# Patient Record
Sex: FEMALE | Race: BLACK OR AFRICAN AMERICAN | NOT HISPANIC OR LATINO | Employment: UNEMPLOYED | URBAN - METROPOLITAN AREA
[De-identification: names, ages, dates, MRNs, and addresses within clinical notes are randomized per-mention and may not be internally consistent; named-entity substitution may affect disease eponyms.]

---

## 2021-04-05 ENCOUNTER — HOSPITAL ENCOUNTER (EMERGENCY)
Facility: HOSPITAL | Age: 36
Discharge: HOME | End: 2021-04-05
Attending: EMERGENCY MEDICINE | Admitting: EMERGENCY MEDICINE

## 2021-04-05 VITALS
DIASTOLIC BLOOD PRESSURE: 77 MMHG | TEMPERATURE: 98.4 F | SYSTOLIC BLOOD PRESSURE: 142 MMHG | HEART RATE: 88 BPM | OXYGEN SATURATION: 100 % | RESPIRATION RATE: 16 BRPM | WEIGHT: 283.07 LBS

## 2021-04-05 DIAGNOSIS — H54.3 VISION LOSS, BILATERAL: Primary | ICD-10-CM

## 2021-04-05 DIAGNOSIS — R73.9 HYPERGLYCEMIA: ICD-10-CM

## 2021-04-05 DIAGNOSIS — R03.0 ELEVATED BLOOD PRESSURE READING WITHOUT DIAGNOSIS OF HYPERTENSION: ICD-10-CM

## 2021-04-05 LAB
ANION GAP SERPL CALCULATED.3IONS-SCNC: 8 MMOL/L (ref 4–13)
BASOPHILS # BLD AUTO: 0.04 THOUSANDS/ΜL (ref 0–0.1)
BASOPHILS NFR BLD AUTO: 1 % (ref 0–1)
BUN SERPL-MCNC: 13 MG/DL (ref 5–25)
CALCIUM SERPL-MCNC: 9.3 MG/DL (ref 8.3–10.1)
CHLORIDE SERPL-SCNC: 102 MMOL/L (ref 100–108)
CO2 SERPL-SCNC: 27 MMOL/L (ref 21–32)
CREAT SERPL-MCNC: 1.07 MG/DL (ref 0.6–1.3)
EOSINOPHIL # BLD AUTO: 0.29 THOUSAND/ΜL (ref 0–0.61)
EOSINOPHIL NFR BLD AUTO: 6 % (ref 0–6)
ERYTHROCYTE [DISTWIDTH] IN BLOOD BY AUTOMATED COUNT: 12.3 % (ref 11.6–15.1)
GFR SERPL CREATININE-BSD FRML MDRD: 77 ML/MIN/1.73SQ M
GLUCOSE SERPL-MCNC: 240 MG/DL (ref 65–140)
GLUCOSE SERPL-MCNC: 289 MG/DL (ref 65–140)
HCT VFR BLD AUTO: 40.7 % (ref 34.8–46.1)
HGB BLD-MCNC: 13.8 G/DL (ref 11.5–15.4)
IMM GRANULOCYTES # BLD AUTO: 0.02 THOUSAND/UL (ref 0–0.2)
IMM GRANULOCYTES NFR BLD AUTO: 0 % (ref 0–2)
LYMPHOCYTES # BLD AUTO: 1.45 THOUSANDS/ΜL (ref 0.6–4.47)
LYMPHOCYTES NFR BLD AUTO: 27 % (ref 14–44)
MCH RBC QN AUTO: 31.3 PG (ref 26.8–34.3)
MCHC RBC AUTO-ENTMCNC: 33.9 G/DL (ref 31.4–37.4)
MCV RBC AUTO: 92 FL (ref 82–98)
MONOCYTES # BLD AUTO: 0.48 THOUSAND/ΜL (ref 0.17–1.22)
MONOCYTES NFR BLD AUTO: 9 % (ref 4–12)
NEUTROPHILS # BLD AUTO: 3.02 THOUSANDS/ΜL (ref 1.85–7.62)
NEUTS SEG NFR BLD AUTO: 57 % (ref 43–75)
NRBC BLD AUTO-RTO: 0 /100 WBCS
PLATELET # BLD AUTO: 226 THOUSANDS/UL (ref 149–390)
PMV BLD AUTO: 10.9 FL (ref 8.9–12.7)
POTASSIUM SERPL-SCNC: 3.5 MMOL/L (ref 3.5–5.3)
RBC # BLD AUTO: 4.41 MILLION/UL (ref 3.81–5.12)
SODIUM SERPL-SCNC: 137 MMOL/L (ref 136–145)
WBC # BLD AUTO: 5.3 THOUSAND/UL (ref 4.31–10.16)

## 2021-04-05 PROCEDURE — 85025 COMPLETE CBC W/AUTO DIFF WBC: CPT | Performed by: EMERGENCY MEDICINE

## 2021-04-05 PROCEDURE — 36415 COLL VENOUS BLD VENIPUNCTURE: CPT | Performed by: EMERGENCY MEDICINE

## 2021-04-05 PROCEDURE — 99284 EMERGENCY DEPT VISIT MOD MDM: CPT

## 2021-04-05 PROCEDURE — 99284 EMERGENCY DEPT VISIT MOD MDM: CPT | Performed by: EMERGENCY MEDICINE

## 2021-04-05 PROCEDURE — 80048 BASIC METABOLIC PNL TOTAL CA: CPT | Performed by: EMERGENCY MEDICINE

## 2021-04-05 PROCEDURE — 82948 REAGENT STRIP/BLOOD GLUCOSE: CPT

## 2021-04-05 RX ORDER — TETRACAINE HYDROCHLORIDE 5 MG/ML
1 SOLUTION OPHTHALMIC ONCE
Status: COMPLETED | OUTPATIENT
Start: 2021-04-05 | End: 2021-04-05

## 2021-04-05 RX ADMIN — TETRACAINE HYDROCHLORIDE 1 DROP: 5 SOLUTION OPHTHALMIC at 13:19

## 2021-04-05 RX ADMIN — FLUORESCEIN SODIUM 1 STRIP: 1 STRIP OPHTHALMIC at 13:19

## 2021-04-05 NOTE — DISCHARGE INSTRUCTIONS
The [facility] can see you today  I was assured that there are arrangements that can be made since you are currently uninsured  Type 2 Diabetes in Adults   WHAT YOU NEED TO KNOW:   Type 2 diabetes is a disease that affects how your body uses glucose (sugar)  Normally, when the blood sugar level increases, the pancreas makes more insulin  Insulin helps move sugar out of the blood so it can be used for energy  Type 2 diabetes develops because either the body cannot make enough insulin, or it cannot use the insulin correctly  After many years, your pancreas may stop making insulin  DISCHARGE INSTRUCTIONS:   Call 911 for any of the following: You have any of the following signs of a stroke:      Numbness or drooping on one side of your face     Weakness in an arm or leg    Confusion or difficulty speaking    Dizziness, a severe headache, or vision loss    You have any of the following signs of a heart attack:      Squeezing, pressure, or pain in your chest that lasts longer than 5 minutes or returns    Discomfort or pain in your back, neck, jaw, stomach, or arm     Trouble breathing    Nausea or vomiting    Lightheadedness or a sudden cold sweat, especially with chest pain or trouble breathing  Return to the emergency department if:   You have severe abdominal pain, or the pain spreads to your back  You may also be vomiting  You have trouble staying awake or focusing  You are shaking or sweating  You have blurred or double vision  Your breath has a fruity, sweet smell  Your breathing is deep and labored, or rapid and shallow  Your heartbeat is fast and weak  Contact your healthcare provider if:   You are vomiting or have diarrhea  You have an upset stomach and cannot eat the foods on your meal plan  You feel weak or more tired than usual      You feel dizzy, have headaches, or are easily irritated  Your skin is red, warm, dry, or swollen       You have a wound that does not heal      You have numbness in your arms or legs  You have trouble coping with your illness, or you feel anxious or depressed  You have questions or concerns about your condition or care  Keep track of carbohydrates (sugar and starchy foods)  Your blood sugar level can get too high if you eat too many carbohydrates  Your dietitian will help you plan meals and snacks that have the right amount of carbohydrates  Eat low-fat foods , such as skinless chicken and low-fat milk  Eat less sodium (salt)  Limit high-sodium foods, such as soy sauce, potato chips, and soup  Do not add salt to food you cook  Limit your use of table salt  You should have less than 2,300 mg of sodium per day  Eat high-fiber foods , such as vegetables, whole grain breads, and beans  Limit alcohol  Alcohol affects your blood sugar level and can make it harder to manage your diabetes  Limit alcohol to 1 drink a day if you are a woman  Limit alcohol to 2 drinks a day if you are a man  A drink of alcohol is 12 ounces of beer, 5 ounces of wine, or 1½ ounces of liquor

## 2021-04-05 NOTE — ED PROVIDER NOTES
History  Chief Complaint   Patient presents with    Blurred Vision     blurry vision [id number] states now worse states now can only see light and shadow, also states eye pain and redness, periodic headaches since feb  38 yo female with no previously diagnosed medical conditions, c/o onset of progressive vision loss, of both eyes over the last month, so that now she can only perceive light/shadow  She affirms periodical sensation of eye pain, but not severe, and periodic HA  She does affirm redness of both eyes that corresponds to pain  Today she has no HA, mild eye pain, R>L, but no vision, which was confirmed in triage, and by me at bedside  She cannot read anything held in front of her  History provided by:  Patient      None       Past Medical History:   Diagnosis Date    No known health problems        Past Surgical History:   Procedure Laterality Date    NO PAST SURGERIES         History reviewed  No pertinent family history  I have reviewed and agree with the history as documented  E-Cigarette/Vaping     E-Cigarette/Vaping Substances     Social History     Tobacco Use    Smoking status: Never Smoker    Smokeless tobacco: Never Used   Substance Use Topics    Alcohol use: Never     Frequency: Never    Drug use: Never       Review of Systems   Constitutional: Negative for appetite change, chills and fever  HENT: Negative for sore throat  Eyes: Positive for pain, redness and visual disturbance  Respiratory: Negative for cough, shortness of breath and wheezing  Cardiovascular: Negative for chest pain and palpitations  Gastrointestinal: Negative for abdominal pain, diarrhea, nausea and vomiting  Genitourinary: Negative for dysuria and hematuria  Musculoskeletal: Negative for neck pain  Skin: Negative for rash  Neurological: Positive for headaches  Negative for dizziness and weakness  Psychiatric/Behavioral: Negative for suicidal ideas     All other systems reviewed and are negative  Physical Exam  Physical Exam  Vitals signs and nursing note reviewed  Constitutional:       General: She is not in acute distress  Appearance: She is well-developed  She is obese  She is not diaphoretic  HENT:      Head: Normocephalic and atraumatic  Right Ear: External ear normal       Left Ear: External ear normal       Nose: Nose normal    Eyes:      General:         Right eye: No foreign body  Left eye: No foreign body  Intraocular pressure: Right eye pressure is 7 mmHg  Left eye pressure is 8 mmHg  Measurements were taken using a handheld tonometer  Extraocular Movements:      Right eye: Normal extraocular motion  Left eye: Normal extraocular motion  Conjunctiva/sclera:      Right eye: Right conjunctiva is injected  No chemosis, exudate or hemorrhage  Left eye: Left conjunctiva is injected  No chemosis, exudate or hemorrhage  Pupils:      Right eye: Pupil is not reactive  No fluorescein uptake  Left eye: Pupil is not reactive  No fluorescein uptake  Slit lamp exam:     Right eye: No hypopyon  Left eye: No hypopyon  Comments: Bilateral sclera are cloudy and pupils are obstructed by a cloudy structure  Visual acuity is only light and movement perception   Neck:      Musculoskeletal: Normal range of motion and neck supple  Cardiovascular:      Rate and Rhythm: Normal rate and regular rhythm  Pulmonary:      Effort: Pulmonary effort is normal    Abdominal:      Palpations: Abdomen is soft  Musculoskeletal: Normal range of motion  Skin:     General: Skin is warm and dry  Capillary Refill: Capillary refill takes less than 2 seconds  Findings: No rash  Neurological:      Mental Status: She is alert and oriented to person, place, and time  Gait: Gait normal    Psychiatric:         Behavior: Behavior normal          Thought Content:  Thought content normal          Judgment: Judgment normal          Vital Signs  ED Triage Vitals   Temperature Pulse Respirations Blood Pressure SpO2   04/05/21 1247 04/05/21 1246 04/05/21 1246 04/05/21 1246 04/05/21 1246   98 4 °F (36 9 °C) 99 18 (!) 185/109 98 %      Temp Source Heart Rate Source Patient Position - Orthostatic VS BP Location FiO2 (%)   04/05/21 1247 04/05/21 1432 04/05/21 1246 04/05/21 1246 --   Oral Monitor Sitting Right arm       Pain Score       04/05/21 1246       6           Vitals:    04/05/21 1246 04/05/21 1327 04/05/21 1432   BP: (!) 185/109 (!) 173/105 142/77   Pulse: 99 89 88   Patient Position - Orthostatic VS: Sitting           Visual Acuity  Visual Acuity      Most Recent Value   L Pupil Size (mm)  3   R Pupil Size (mm)  3          ED Medications  Medications   tetracaine 0 5 % ophthalmic solution 1 drop (1 drop Both Eyes Given by Other 4/5/21 1319)   fluorescein sodium sterile ophthalmic strip 1 strip (1 strip Both Eyes Given by Other 4/5/21 1319)       Diagnostic Studies  Results Reviewed     Procedure Component Value Units Date/Time    Basic metabolic panel [[id number]]  (Abnormal) Collected: 04/05/21 1403    Lab Status: Final result Specimen: Blood from Hand, Left Updated: 04/05/21 1428     Sodium 137 mmol/L      Potassium 3 5 mmol/L      Chloride 102 mmol/L      CO2 27 mmol/L      ANION GAP 8 mmol/L      BUN 13 mg/dL      Creatinine 1 07 mg/dL      Glucose 289 mg/dL      Calcium 9 3 mg/dL      eGFR 77 ml/min/1 73sq m     Narrative:      Mariely guidelines for Chronic Kidney Disease (CKD):     Stage 1 with normal or high GFR (GFR > 90 mL/min/1 73 square meters)    Stage 2 Mild CKD (GFR = 60-89 mL/min/1 73 square meters)    Stage 3A Moderate CKD (GFR = 45-59 mL/min/1 73 square meters)    Stage 3B Moderate CKD (GFR = 30-44 mL/min/1 73 square meters)    Stage 4 Severe CKD (GFR = 15-29 mL/min/1 73 square meters)    Stage 5 End Stage CKD (GFR <15 mL/min/1 73 square meters)  Note: GFR calculation is accurate only with a steady state creatinine    CBC and differential [[id number]] Collected: 04/05/21 1403    Lab Status: Final result Specimen: Blood from Hand, Left Updated: 04/05/21 1409     WBC 5 30 Thousand/uL      RBC 4 41 Million/uL      Hemoglobin 13 8 g/dL      Hematocrit 40 7 %      MCV 92 fL      MCH 31 3 pg      MCHC 33 9 g/dL      RDW 12 3 %      MPV 10 9 fL      Platelets 583 Thousands/uL      nRBC 0 /100 WBCs      Neutrophils Relative 57 %      Immat GRANS % 0 %      Lymphocytes Relative 27 %      Monocytes Relative 9 %      Eosinophils Relative 6 %      Basophils Relative 1 %      Neutrophils Absolute 3 02 Thousands/µL      Immature Grans Absolute 0 02 Thousand/uL      Lymphocytes Absolute 1 45 Thousands/µL      Monocytes Absolute 0 48 Thousand/µL      Eosinophils Absolute 0 29 Thousand/µL      Basophils Absolute 0 04 Thousands/µL     Fingerstick Glucose (POCT) [[id number]]  (Abnormal) Collected: 04/05/21 1326    Lab Status: Final result Updated: 04/05/21 1328     POC Glucose 240 mg/dl                  No orders to display              Procedures  Procedures         ED Course  ED Course as of Apr 05 1438   Mon Apr 05, 2021   1356 Call to [facility], waiting for call back, I am doing labs in the meantime for other medical complications      8109 I discussed the case with covering practitioner at [facility] office, and she asked that patient come right over, they know she is uninsured      1435 Reviewed results with patient at bedside and updated on the plan, informing her of elevated blood sugar representing undiagnosed DM2, and elevated blood pressure, which did improve in ED, advising follow up as soon as possible to get these under control and that they contribute to vision changes  SBIRT 20yo+      Most Recent Value   SBIRT (24 yo +)   In order to provide better care to our patients, we are screening all of our patients for alcohol and drug use   Would it be okay to ask you these screening questions? No Filed at: 04/05/2021 1409   Initial Alcohol Screen: US AUDIT-C    1  How often do you have a drink containing alcohol?  0 Filed at: 04/05/2021 1409   2  How many drinks containing alcohol do you have on a typical day you are drinking? 0 Filed at: 04/05/2021 1409   3a  Male UNDER 65: How often do you have five or more drinks on one occasion? 0 Filed at: 04/05/2021 1409   3b  FEMALE Any Age, or MALE 65+: How often do you have 4 or more drinks on one occassion? 0 Filed at: 04/05/2021 1409   Audit-C Score  0 Filed at: 04/05/2021 1409   KELLY: How many times in the past year have you    Used an illegal drug or used a prescription medication for non-medical reasons? Never Filed at: 04/05/2021 1409                    MDM    Disposition  Final diagnoses:   Vision loss, bilateral   Hyperglycemia   Elevated blood pressure reading without diagnosis of hypertension     Time reflects when diagnosis was documented in both MDM as applicable and the Disposition within this note     Time User Action Codes Description Comment    4/5/2021  2:11 PM Zina Navarrete Add [H54 3] Vision loss, bilateral     4/5/2021  2:17 PM Hernandez Roopa L Add [R73 9] Hyperglycemia     4/5/2021  2:35 PM Hernandez Roopa L Add [R03 0] Elevated blood pressure reading without diagnosis of hypertension       ED Disposition     ED Disposition Condition Date/Time Comment    Discharge Good Mon Apr 5, 2021  2:11 PM Eliseo Gibson discharge to home/self care  Follow-up Information     Follow up With Specialties Details Why [address] [employer]  Go to  For followup today, straight from the ED [address] [facility]  [address] Family Medicine Schedule an appointment as soon as possible for a visit  For followup [address], [address] [facility] [address]  [address], [address], [address] [place], [place], [address]          Patient's Medications    No medications on file     No discharge procedures on file      PDMP Review     None          ED Provider  Electronically Signed by           Jan Leo MD  04/05/21 8568

## 2021-10-25 ENCOUNTER — OFFICE VISIT (OUTPATIENT)
Dept: FAMILY MEDICINE CLINIC | Facility: CLINIC | Age: 36
End: 2021-10-25

## 2021-10-25 ENCOUNTER — APPOINTMENT (OUTPATIENT)
Dept: LAB | Facility: CLINIC | Age: 36
End: 2021-10-25
Payer: COMMERCIAL

## 2021-10-25 VITALS
OXYGEN SATURATION: 98 % | HEART RATE: 81 BPM | WEIGHT: 276.2 LBS | TEMPERATURE: 98 F | SYSTOLIC BLOOD PRESSURE: 140 MMHG | BODY MASS INDEX: 54.22 KG/M2 | RESPIRATION RATE: 16 BRPM | HEIGHT: 60 IN | DIASTOLIC BLOOD PRESSURE: 100 MMHG

## 2021-10-25 DIAGNOSIS — E11.9 DIABETES MELLITUS WITHOUT COMPLICATION (HCC): ICD-10-CM

## 2021-10-25 DIAGNOSIS — Z01.818 PRE-OP EVALUATION: ICD-10-CM

## 2021-10-25 DIAGNOSIS — Z01.818 PRE-OP EVALUATION: Primary | ICD-10-CM

## 2021-10-25 LAB
ANION GAP SERPL CALCULATED.3IONS-SCNC: 5 MMOL/L (ref 4–13)
BUN SERPL-MCNC: 6 MG/DL (ref 5–25)
CALCIUM SERPL-MCNC: 9.2 MG/DL (ref 8.3–10.1)
CHLORIDE SERPL-SCNC: 105 MMOL/L (ref 100–108)
CHOLEST SERPL-MCNC: 149 MG/DL (ref 50–200)
CO2 SERPL-SCNC: 26 MMOL/L (ref 21–32)
CREAT SERPL-MCNC: 1.15 MG/DL (ref 0.6–1.3)
EST. AVERAGE GLUCOSE BLD GHB EST-MCNC: 186 MG/DL
GFR SERPL CREATININE-BSD FRML MDRD: 70 ML/MIN/1.73SQ M
GLUCOSE P FAST SERPL-MCNC: 158 MG/DL (ref 65–99)
HBA1C MFR BLD: 8.1 %
HDLC SERPL-MCNC: 39 MG/DL
LDLC SERPL CALC-MCNC: 87 MG/DL (ref 0–100)
NONHDLC SERPL-MCNC: 110 MG/DL
POTASSIUM SERPL-SCNC: 3.8 MMOL/L (ref 3.5–5.3)
SODIUM SERPL-SCNC: 136 MMOL/L (ref 136–145)
TRIGL SERPL-MCNC: 116 MG/DL

## 2021-10-25 PROCEDURE — 83036 HEMOGLOBIN GLYCOSYLATED A1C: CPT

## 2021-10-25 PROCEDURE — 80048 BASIC METABOLIC PNL TOTAL CA: CPT

## 2021-10-25 PROCEDURE — 36415 COLL VENOUS BLD VENIPUNCTURE: CPT

## 2021-10-25 PROCEDURE — 80061 LIPID PANEL: CPT

## 2021-10-25 PROCEDURE — 99213 OFFICE O/P EST LOW 20 MIN: CPT | Performed by: INTERNAL MEDICINE

## 2021-10-26 PROCEDURE — 93000 ELECTROCARDIOGRAM COMPLETE: CPT | Performed by: INTERNAL MEDICINE

## 2021-10-29 ENCOUNTER — TELEPHONE (OUTPATIENT)
Dept: FAMILY MEDICINE CLINIC | Facility: CLINIC | Age: 36
End: 2021-10-29

## 2021-11-02 ENCOUNTER — TELEPHONE (OUTPATIENT)
Dept: FAMILY MEDICINE CLINIC | Facility: CLINIC | Age: 36
End: 2021-11-02

## 2022-05-19 ENCOUNTER — APPOINTMENT (OUTPATIENT)
Dept: LAB | Facility: CLINIC | Age: 37
End: 2022-05-19
Payer: MEDICARE

## 2022-05-19 ENCOUNTER — CONSULT (OUTPATIENT)
Dept: FAMILY MEDICINE CLINIC | Facility: CLINIC | Age: 37
End: 2022-05-19

## 2022-05-19 VITALS
HEIGHT: 60 IN | SYSTOLIC BLOOD PRESSURE: 142 MMHG | BODY MASS INDEX: 53.81 KG/M2 | TEMPERATURE: 98 F | WEIGHT: 274.1 LBS | HEART RATE: 91 BPM | OXYGEN SATURATION: 98 % | DIASTOLIC BLOOD PRESSURE: 92 MMHG | RESPIRATION RATE: 18 BRPM

## 2022-05-19 DIAGNOSIS — Z01.818 PRE-OP EXAM: ICD-10-CM

## 2022-05-19 DIAGNOSIS — I10 HYPERTENSION, UNSPECIFIED TYPE: ICD-10-CM

## 2022-05-19 DIAGNOSIS — E11.9 TYPE 2 DIABETES MELLITUS WITHOUT COMPLICATION, WITHOUT LONG-TERM CURRENT USE OF INSULIN (HCC): ICD-10-CM

## 2022-05-19 DIAGNOSIS — J30.9 ALLERGIC RHINITIS, UNSPECIFIED SEASONALITY, UNSPECIFIED TRIGGER: ICD-10-CM

## 2022-05-19 DIAGNOSIS — K21.9 GASTROESOPHAGEAL REFLUX DISEASE, UNSPECIFIED WHETHER ESOPHAGITIS PRESENT: ICD-10-CM

## 2022-05-19 DIAGNOSIS — E11.9 TYPE 2 DIABETES MELLITUS WITHOUT COMPLICATION, WITHOUT LONG-TERM CURRENT USE OF INSULIN (HCC): Primary | ICD-10-CM

## 2022-05-19 LAB
ANION GAP SERPL CALCULATED.3IONS-SCNC: 5 MMOL/L (ref 4–13)
BUN SERPL-MCNC: 7 MG/DL (ref 5–25)
CALCIUM SERPL-MCNC: 9.7 MG/DL (ref 8.3–10.1)
CHLORIDE SERPL-SCNC: 105 MMOL/L (ref 100–108)
CO2 SERPL-SCNC: 28 MMOL/L (ref 21–32)
CREAT SERPL-MCNC: 1 MG/DL (ref 0.6–1.3)
EST. AVERAGE GLUCOSE BLD GHB EST-MCNC: 214 MG/DL
GFR SERPL CREATININE-BSD FRML MDRD: 71 ML/MIN/1.73SQ M
GLUCOSE P FAST SERPL-MCNC: 158 MG/DL (ref 65–99)
HBA1C MFR BLD: 9.1 %
POTASSIUM SERPL-SCNC: 3.9 MMOL/L (ref 3.5–5.3)
SL AMB POCT GLUCOSE BLD: 194
SODIUM SERPL-SCNC: 138 MMOL/L (ref 136–145)
TSH SERPL DL<=0.05 MIU/L-ACNC: 1.8 UIU/ML (ref 0.45–4.5)

## 2022-05-19 PROCEDURE — 99213 OFFICE O/P EST LOW 20 MIN: CPT | Performed by: INTERNAL MEDICINE

## 2022-05-19 PROCEDURE — 84443 ASSAY THYROID STIM HORMONE: CPT

## 2022-05-19 PROCEDURE — 93000 ELECTROCARDIOGRAM COMPLETE: CPT | Performed by: INTERNAL MEDICINE

## 2022-05-19 PROCEDURE — 83036 HEMOGLOBIN GLYCOSYLATED A1C: CPT

## 2022-05-19 PROCEDURE — 82948 REAGENT STRIP/BLOOD GLUCOSE: CPT | Performed by: INTERNAL MEDICINE

## 2022-05-19 PROCEDURE — 80048 BASIC METABOLIC PNL TOTAL CA: CPT

## 2022-05-19 PROCEDURE — 36415 COLL VENOUS BLD VENIPUNCTURE: CPT

## 2022-05-19 RX ORDER — FAMOTIDINE 20 MG/1
20 TABLET, FILM COATED ORAL 2 TIMES DAILY
Qty: 60 TABLET | Refills: 0 | Status: SHIPPED | OUTPATIENT
Start: 2022-05-19 | End: 2022-07-18 | Stop reason: SDUPTHER

## 2022-05-19 RX ORDER — CETIRIZINE HYDROCHLORIDE 10 MG/1
10 TABLET ORAL DAILY
Qty: 30 TABLET | Refills: 1 | Status: SHIPPED | OUTPATIENT
Start: 2022-05-19

## 2022-05-19 NOTE — PROGRESS NOTES
Assessment/Plan:    Gastroesophageal reflux disease  -symptoms epigastric discomfort  -not related to any type of foods  -sour sensation in the mouth,occur in bed at times  -TUMS PRN--> not much relief now  -physical exam: some epigastric tenderness upon palpation  -discussed with patient symptoms suggestive for GERD  -start trial with Pepcid BID  -avoidance of food irritants  -f/u in 1 month --> if no improvement, consider add PPI and GI referral If warranted    Type 2 diabetes mellitus without complication, without long-term current use of insulin ([facility] )  -hb1ac 8 1% on 10/2021  -discussed with patient at that time about diabetes diagnosis and recommendations for started treatment--> patient elected dietary modifications at that time, was lost to follow up after  -reports trying to make dietary modifications as well as exercise, not much change in weight  -also reported polyuria  -POCT blood glucose: 194  -discussed with patient that at this point, is recommended to start medication to be able to control diabetes and prevent further complications  -patient agreeable to have updated hba1c and BMP for kidney function and upon results can determine treatment  -discussed importance of diabetic diet ( low sugars, breads, pastas) and exercise as well     Allergic rhinitis  -reported hx of allergies  -tried benadryl PRN  -recommended trial with zyrtec daily    BMI 50 0-59 9, adult ([facility] )  -reviewed healthy lifestyle, diet, and exercise recommendations  -patient having difficulty with weight loss  -she would like referral for bariatrics for evaluation--> referral placed    Hypertension  Blood Pressure: 142/92     ->140/90 in two readings on office visit  -asymptomatic  -discussed with patient importance of consider start treatment that will also be beneficial for diabetes treatment  agreeable to follow up with updated BMP to check kidney function and see if appropriate to start Lisinopril   -f/u BMP       Diagnoses and all orders for this visit:    Type 2 diabetes mellitus without complication, without long-term current use of insulin ([facility])  -     Basic metabolic panel; Future  -     HEMOGLOBIN A1C W/ EAG ESTIMATION; Future  -     POCT blood glucose    Pre-op exam  -     POCT ECG    BMI 50 0-59 9, adult ([facility])  -     TSH, 3rd generation with Free T4 reflex; Future  -     Ambulatory Referral to Weight Management; Future    Allergic rhinitis, unspecified seasonality, unspecified trigger  -     cetirizine (ZyrTEC) 10 mg tablet; Take 1 tablet (10 mg total) by mouth in the morning  Gastroesophageal reflux disease, unspecified whether esophagitis present  -     famotidine (PEPCID) 20 mg tablet; Take 1 tablet (20 mg total) by mouth in the morning and 1 tablet (20 mg total) in the evening  Hypertension, unspecified type          Subjective:      Patient ID: Sonya Be is a 44 y o  female  Case of 45 y/o female who was evaluated for chronic conditions:    Diabetes: hba1c 8 1% in 10/2021--> patient declined medications at that time and elected dietary modifications and exercise  Was lost to follow up afterwards  Has tried doing that, not much weight change noted  Reports also polyuria  Is agreeable to consider start medications at this time       Borderline elevated blood pressure-> Asymptomatic; agreeable to consider start medications at this time    gerd--> epigastric discomfort, reflux sensation, sour sensation in bed at times  TUMS PRN not working much now     Allergy--> rhinitis-->  Benadryl PRN  Worse with the pollen  Not tried zyrtec/ or claritin yet    Check weight--> changing diet--> rice no carb rice  Acid reflux symptoms and gas sensation worse   Trying to walk more often  Breast enlargment--> worsen back pain  Would like to see if able to have evaluation by bariatrics specialists      The following portions of the patient's history were reviewed and updated as appropriate: allergies, current medications, past family history, past medical history, past social history, past surgical history and problem list     Review of Systems   Constitutional: Negative for fever  Respiratory: Negative for cough, shortness of breath and wheezing  Cardiovascular: Negative for chest pain, palpitations and leg swelling  Gastrointestinal: Negative for abdominal pain  Endocrine: Positive for polyuria  Musculoskeletal: Positive for back pain ( breast enlargement)  Skin: Negative  Neurological: Negative for headaches  Psychiatric/Behavioral: The patient is not nervous/anxious  Objective:      /92 (BP Location: Left arm, Patient Position: Sitting, Cuff Size: Large)   Pulse 91   Temp 98 °F (36 7 °C) (Temporal)   Resp 18   Ht 5' (1 524 m)   Wt 124 kg (274 lb 1 6 oz)   SpO2 98%   BMI 53 53 kg/m²          Physical Exam  Vitals reviewed  Constitutional:       General: She is not in acute distress  Appearance: Normal appearance  She is not ill-appearing, toxic-appearing or diaphoretic  Eyes:      Extraocular Movements: Extraocular movements intact  Cardiovascular:      Rate and Rhythm: Normal rate and regular rhythm  Pulses: Normal pulses  Heart sounds: Normal heart sounds  No murmur heard  Pulmonary:      Effort: Pulmonary effort is normal  No respiratory distress  Breath sounds: Normal breath sounds  No wheezing  Abdominal:      General: Abdomen is flat  Bowel sounds are normal  There is no distension  Tenderness: There is abdominal tenderness ( epigastric )  Musculoskeletal:         General: Normal range of motion  Skin:     General: Skin is warm  Neurological:      Mental Status: She is alert  Mental status is at baseline     Psychiatric:         Mood and Affect: Mood normal

## 2022-05-19 NOTE — PROGRESS NOTES
FAMILY PRACTICE PRE-OPERATIVE EVALUATION  [facility] PHYSICIAN GROUP [facility] MARYANN    NAME: Natalia Worthy  AGE: 44 y o  SEX: female  : 1985     DATE: 2022    Family Practice Pre-Operative Evaluation      Chief Complaint: Pre-operative Evaluation     Surgery: left eye cataract surgery  Anticipated Date of Surgery: 2022     History of Present Illness:     Patient has no prior history of bleeding issues or blood clots  Chronic conditions, medications and allergies were reviewed  There is no currently active infectious process  Assessment of Cardiac Risk:  · No unstable or severe angina or MI in the last 6 weeks or history of stent placement in the last year   · No decompensated heart failure (e g  New onset heart failure, NYHA functional class IV heart failure, or worsening existing heart failure) in past 3 mos  · No severe heart valve disease including aortic stenosis or symptomatic mitral stenosis     Exercise Capacity:  · Able to walk 4 blocks without symptoms  · Able to walk 2 flights without symptoms    NO Prior Anesthesia Reactions       No prolonged steroid use in past 6 mos  P A T  If done reviewed  Patient reports is doing more exercise right now; less Shortness of breath  -changing the diet  -not much weight changes has noted  -no medication so far   -multivitamin OTC   -no reactions to anesthesia in prior eye cataract surgery           Review of Systems:     Review of Systems   Constitutional: Negative for fever  Respiratory: Negative for cough, shortness of breath and wheezing  Cardiovascular: Negative for chest pain, palpitations and leg swelling  Gastrointestinal: Negative for abdominal pain  Epigastric discomfort   Endocrine: Positive for polyuria  Skin: Negative  Neurological: Negative for headaches  Psychiatric/Behavioral: The patient is not nervous/anxious          Current Problem List:     Patient Active Problem List   Diagnosis    Pre-op exam    Type 2 diabetes mellitus without complication, without long-term current use of insulin ([facility])    Allergic rhinitis    Gastroesophageal reflux disease    BMI 50 0-59 9, adult ([facility] [address] )    Hypertension       Allergies:     No Known Allergies    Current Medications:       Current Outpatient Medications:     cetirizine (ZyrTEC) 10 mg tablet, Take 1 tablet (10 mg total) by mouth in the morning , Disp: 30 tablet, Rfl: 1    famotidine (PEPCID) 20 mg tablet, Take 1 tablet (20 mg total) by mouth in the morning and 1 tablet (20 mg total) in the evening , Disp: 60 tablet, Rfl: 0    Past Medical History:       Past Medical History:   Diagnosis Date    No known health problems         Past Surgical History:   Procedure Laterality Date    NO PAST SURGERIES          No family history on file  Social History     Socioeconomic History    Marital status: Single     Spouse name: Not on file    Number of children: Not on file    Years of education: Not on file    Highest education level: Not on file   Occupational History    Not on file   Tobacco Use    Smoking status: Never Smoker    Smokeless tobacco: Never Used   Substance and Sexual Activity    Alcohol use: Never    Drug use: Never    Sexual activity: Not on file   Other Topics Concern    Not on file   Social History Narrative    Not on file     Social Determinants of Health     Financial Resource Strain: Low Risk     Difficulty of Paying Living Expenses: Not hard at all   Food Insecurity: No Food Insecurity    Worried About Running Out of Food in the Last Year: Never true    [address] in the Last Year: Never true   Transportation Needs: No Transportation Needs    Lack of Transportation (Medical): No    Lack of Transportation (Non-Medical):  No   Physical Activity: Not on file   Stress: Not on file   Social Connections: Not on file   Intimate Partner Violence: Not on file   Housing Stability: Not on file Physical Exam:     /92 (BP Location: Left arm, Patient Position: Sitting, Cuff Size: Large)   Pulse 91   Temp 98 °F (36 7 °C) (Temporal)   Resp 18   Ht 5' (1 524 m)   Wt 124 kg (274 lb 1 6 oz)   SpO2 98%   BMI 53 53 kg/m²     Physical Exam  Vitals reviewed  Constitutional:       General: She is not in acute distress  Appearance: Normal appearance  She is obese  She is not ill-appearing, toxic-appearing or diaphoretic  Eyes:      General:         Right eye: No discharge  Left eye: No discharge  Extraocular Movements: Extraocular movements intact  Conjunctiva/sclera: Conjunctivae normal       Pupils: Pupils are equal, round, and reactive to light  Cardiovascular:      Rate and Rhythm: Normal rate and regular rhythm  Pulses: Normal pulses  Heart sounds: Normal heart sounds  No murmur heard  Pulmonary:      Effort: Pulmonary effort is normal  No respiratory distress  Breath sounds: Normal breath sounds  No wheezing  Abdominal:      General: Abdomen is flat  Bowel sounds are normal  There is no distension  Palpations: Abdomen is soft  Tenderness: There is abdominal tenderness ( epigastric)  Comments: Globus abdomen   Musculoskeletal:         General: Normal range of motion  Skin:     General: Skin is warm  Neurological:      Mental Status: She is alert  Mental status is at baseline  Psychiatric:         Mood and Affect: Mood normal          Operative site has been examined and clear of skin infection and inflammation  Assessment & Recommendations:         Preoperative evaluation:  -POCT EKG today: normal sinus rhythm, 82 BPM, septal infarct, age undertermined ( unchanged from prior); similar findings in EKG at [facility] on 10/2021  -completed Echocardiogram at [facility] 10/2021: EF 52-58%, normal diastolic dysfunction, normal systolic function  Mild concentric hypertrophy    Patient is cleared for cataract surgery as detailed above  Surgical Procedure risk category: low risk    Patient specific operative risk categegory: intermediate-moderate risk

## 2022-05-20 ENCOUNTER — TELEPHONE (OUTPATIENT)
Dept: FAMILY MEDICINE CLINIC | Facility: CLINIC | Age: 37
End: 2022-05-20

## 2022-05-20 DIAGNOSIS — E11.9 TYPE 2 DIABETES MELLITUS WITHOUT COMPLICATION, WITHOUT LONG-TERM CURRENT USE OF INSULIN (HCC): Primary | ICD-10-CM

## 2022-05-20 DIAGNOSIS — I10 HYPERTENSION, UNSPECIFIED TYPE: ICD-10-CM

## 2022-05-20 RX ORDER — LISINOPRIL 5 MG/1
5 TABLET ORAL DAILY
Qty: 30 TABLET | Refills: 2 | Status: SHIPPED | OUTPATIENT
Start: 2022-05-20 | End: 2022-06-20 | Stop reason: SDUPTHER

## 2022-05-20 NOTE — TELEPHONE ENCOUNTER
Called patient to discuss lab work results:    -hba1c -> increased from prior to 9 1%--> discussed with patient that is important to start medications for diabetes to avoid progression and complications of disease    -discussed that with this Hba1c goal, Insulin is recommended, there are also oral medications that can be tried as well  Patient declines Insulin at this time, would like to try oral medications  -will start: metformin 500 mg BID--> start 500 mg daily for one week, then increase to 500 mg BID  Discussed about potential GI diarrhea side effects, if unable to tolerate medication to call back  -recommend at this point given hba1c >9 0%, to check sugars, at least in AM to monitor--> glucometer sent, patient states to know how to use machine  If any issues, call clinic for nurse visit for glucometer teaching     -kidney function overall at goal for patient's age--> discussed that at this point, can also start medication for blood pressure  Patient agreeable  -will start: lisinopril 5 mg daily     -thyroid levels are normal    -follow up hba1c in 3 months, order will be placed  -bring glucose logs to upcoming visit in June, 2022   -given Diagnosis of diabetes, it is also recommended to start statin medication--> will follow up on this in upcoming visit to assess tolerance of these new medications for diabetes and blood pressure first prior to introducing another new medication   -if any issues in the meantime, to call back  Patient verbalized understanding

## 2022-05-20 NOTE — ASSESSMENT & PLAN NOTE
-hb1ac 8 1% on 10/2021  -discussed with patient at that time about diabetes diagnosis and recommendations for started treatment--> patient elected dietary modifications at that time, was lost to follow up after  -reports trying to make dietary modifications as well as exercise, not much change in weight  -also reported polyuria  -POCT blood glucose: 194  -discussed with patient that at this point, is recommended to start medication to be able to control diabetes and prevent further complications  -patient agreeable to have updated hba1c and BMP for kidney function and upon results can determine treatment  -discussed importance of diabetic diet ( low sugars, breads, pastas) and exercise as well

## 2022-05-20 NOTE — ASSESSMENT & PLAN NOTE
Blood Pressure: 142/92     ->140/90 in two readings on office visit  -asymptomatic  -discussed with patient importance of consider start treatment that will also be beneficial for diabetes treatment  agreeable to follow up with updated BMP to check kidney function and see if appropriate to start Lisinopril   -f/u BMP

## 2022-05-20 NOTE — ASSESSMENT & PLAN NOTE
-symptoms epigastric discomfort  -not related to any type of foods  -sour sensation in the mouth,occur in bed at times  -TUMS PRN--> not much relief now  -physical exam: some epigastric tenderness upon palpation  -discussed with patient symptoms suggestive for GERD  -start trial with Pepcid BID  -avoidance of food irritants  -f/u in 1 month --> if no improvement, consider add PPI and GI referral If warranted

## 2022-05-20 NOTE — ASSESSMENT & PLAN NOTE
-reviewed healthy lifestyle, diet, and exercise recommendations  -patient having difficulty with weight loss  -she would like referral for bariatrics for evaluation--> referral placed

## 2022-05-31 ENCOUNTER — TELEPHONE (OUTPATIENT)
Dept: FAMILY MEDICINE CLINIC | Facility: CLINIC | Age: 37
End: 2022-05-31

## 2022-05-31 NOTE — TELEPHONE ENCOUNTER
Form for pre-operative clearance left eye cataract surgery was completed on 05/31/22 and placed in fax bin

## 2022-06-01 NOTE — TELEPHONE ENCOUNTER
FAXED ON 06/01/22 TO [place] [employer] for 2 [facility] with Office Notes  at [phone]  FAX CONFIRMATION RECEIVED  Also called pt to inform that form was completed and if she needed the copy she can come to our office and requested  NOTE: form with Confirmation Fax has been scanned into Pt Chart

## 2022-06-20 ENCOUNTER — OFFICE VISIT (OUTPATIENT)
Dept: FAMILY MEDICINE CLINIC | Facility: CLINIC | Age: 37
End: 2022-06-20

## 2022-06-20 VITALS
SYSTOLIC BLOOD PRESSURE: 146 MMHG | HEART RATE: 93 BPM | TEMPERATURE: 97.3 F | HEIGHT: 60 IN | BODY MASS INDEX: 53.4 KG/M2 | OXYGEN SATURATION: 97 % | WEIGHT: 272 LBS | RESPIRATION RATE: 22 BRPM | DIASTOLIC BLOOD PRESSURE: 86 MMHG

## 2022-06-20 DIAGNOSIS — E11.9 TYPE 2 DIABETES MELLITUS WITHOUT COMPLICATION, WITHOUT LONG-TERM CURRENT USE OF INSULIN (HCC): ICD-10-CM

## 2022-06-20 DIAGNOSIS — I10 HYPERTENSION, UNSPECIFIED TYPE: ICD-10-CM

## 2022-06-20 PROCEDURE — 99214 OFFICE O/P EST MOD 30 MIN: CPT

## 2022-06-20 RX ORDER — LISINOPRIL 5 MG/1
5 TABLET ORAL DAILY
Qty: 30 TABLET | Refills: 2 | Status: SHIPPED | OUTPATIENT
Start: 2022-06-20

## 2022-06-20 RX ORDER — BLOOD-GLUCOSE METER
1 KIT MISCELLANEOUS DAILY
Qty: 1 EACH | Refills: 0 | Status: SHIPPED | OUTPATIENT
Start: 2022-06-20 | End: 2022-06-24 | Stop reason: RX

## 2022-06-20 RX ORDER — LANCETS 28 GAUGE
EACH MISCELLANEOUS DAILY
Qty: 100 EACH | Refills: 1 | Status: SHIPPED | OUTPATIENT
Start: 2022-06-20

## 2022-06-20 NOTE — PROGRESS NOTES
Assessment/Plan:    Type 2 diabetes mellitus without complication, without long-term current use of insulin (HCC)    Lab Results   Component Value Date    HGBA1C 9 1 (H) 05/19/2022   Per phone note 5/20/22, pt was to begin metformin and lisinopril and begin checking blood sugars daily  Pt states she was unable to  the medications or meter from the pharmacy and then had cataract surgery so did not follow up  - Reviewed pathophysiology of diabetes and why it is important to get blood sugar under control    - Start metformin 500 mg daily x1 week then increase to 500 mg BID  - Begin checking and recording blood sugar daily  Pt states she knows how to use a glucometer  Bring log to follow up in one month  - Pt to call if any difficulty picking up the medications or supplies    - Referral to Weight Management  Hypertension  Pt did not start taking the lisinopril that was prescribed on 5/20/22    - Start lisinopril 5 mg daily   - Follow up in office in 1 month  BMI 50 0-59 9, adult ([place] [address] )  - Pt has existing referral to Weight Management but has not yet been seen  Pt is interested in both surgical and non-surgical approaches to weight loss, but would like to avoid surgery if possible, stating she would like to have children  Contacted referral team to determine if appt can be moved up  Diagnoses and all orders for this visit:    BMI 50 0-59 9, adult ([facility] )    Type 2 diabetes mellitus without complication, without long-term current use of insulin ([facility])  -     metFORMIN (GLUCOPHAGE) 500 mg tablet; Take 1 tablet (500 mg total) by mouth 2 (two) times a day with meals  -     lisinopril (ZESTRIL) 5 mg tablet;  Take 1 tablet (5 mg total) by mouth daily  -     glucose monitoring kit (FREESTYLE) monitoring kit; Use 1 each daily  -     Glucometer test strips  -     Lancets (freestyle) lancets; Use in the morning Use as instructed  -     Diabetic foot exam    Hypertension, unspecified type  -     lisinopril (ZESTRIL) 5 mg tablet; Take 1 tablet (5 mg total) by mouth daily          Subjective:      Patient ID: Oriana Stewart is a 44 y o  female  HPI    [facility] presented to the office today for routine follow up for chronic conditions  Pt was to start metformin and lisinopril 1 month ago but did not  medications  She had cataract surgery left eye 6/8/22, recovering well  Follows with [facility]  The following portions of the patient's history were reviewed and updated as appropriate: allergies, current medications, past family history, past medical history, past social history, past surgical history and problem list     Review of Systems   Constitutional: Negative for activity change, appetite change, fatigue, fever and unexpected weight change  Eyes: Negative for visual disturbance  Respiratory: Negative for cough, chest tightness, shortness of breath and wheezing  Cardiovascular: Positive for leg swelling  Negative for chest pain and palpitations  Gastrointestinal: Positive for constipation  Negative for abdominal pain, diarrhea, nausea and vomiting  Endocrine: Positive for polydipsia and polyuria  Genitourinary: Negative for difficulty urinating  Musculoskeletal: Positive for back pain  Neurological: Positive for numbness (occasional tingling in feet)  Negative for dizziness, weakness and headaches  All other systems reviewed and are negative  Objective:      /86 (BP Location: Left arm, Patient Position: Sitting, Cuff Size: Standard)   Pulse 93   Temp (!) 97 3 °F (36 3 °C) (Temporal)   Resp 22   Ht 5' (1 524 m)   Wt 123 kg (272 lb)   LMP 06/05/2022 (Exact Date)   SpO2 97%   Breastfeeding No   BMI 53 12 kg/m²          Physical Exam  Vitals reviewed  Constitutional:       General: She is not in acute distress  Appearance: She is obese  She is not ill-appearing or diaphoretic  HENT:      Head: Normocephalic and atraumatic     Cardiovascular: Rate and Rhythm: Normal rate and regular rhythm  Pulses:           Dorsalis pedis pulses are 1+ on the right side and 1+ on the left side  Heart sounds: Normal heart sounds  No murmur heard  Pulmonary:      Effort: Pulmonary effort is normal       Breath sounds: Normal breath sounds  No decreased breath sounds or wheezing  Musculoskeletal:      Right lower le+ Edema present  Left lower le+ Edema present  Right foot: Normal range of motion  No deformity  Left foot: Normal range of motion  No deformity  Feet:      Right foot:      Protective Sensation: 10 sites tested  8 sites sensed  Skin integrity: Skin integrity normal       Toenail Condition: Right toenails are normal       Left foot:      Protective Sensation: 10 sites tested  8 sites sensed  Skin integrity: Skin integrity normal       Toenail Condition: Left toenails are normal    Skin:     General: Skin is warm and dry  Capillary Refill: Capillary refill takes less than 2 seconds  Neurological:      Mental Status: She is alert and oriented to person, place, and time     Psychiatric:         Mood and Affect: Mood and affect normal

## 2022-06-21 NOTE — ASSESSMENT & PLAN NOTE
Pt did not start taking the lisinopril that was prescribed on 5/20/22    - Start lisinopril 5 mg daily   - Follow up in office in 1 month

## 2022-06-21 NOTE — ASSESSMENT & PLAN NOTE
Lab Results   Component Value Date    HGBA1C 9 1 (H) 05/19/2022   Per phone note 5/20/22, pt was to begin metformin and lisinopril and begin checking blood sugars daily  Pt states she was unable to  the medications or meter from the pharmacy and then had cataract surgery so did not follow up  - Reviewed pathophysiology of diabetes and why it is important to get blood sugar under control    - Start metformin 500 mg daily x1 week then increase to 500 mg BID  - Begin checking and recording blood sugar daily  Pt states she knows how to use a glucometer  Bring log to follow up in one month  - Pt to call if any difficulty picking up the medications or supplies    - Referral to Weight Management

## 2022-06-21 NOTE — ASSESSMENT & PLAN NOTE
- Pt has existing referral to Weight Management but has not yet been seen  Pt is interested in both surgical and non-surgical approaches to weight loss, but would like to avoid surgery if possible, stating she would like to have children  Contacted referral team to determine if appt can be moved up

## 2022-06-23 ENCOUNTER — TELEPHONE (OUTPATIENT)
Dept: FAMILY MEDICINE CLINIC | Facility: CLINIC | Age: 37
End: 2022-06-23

## 2022-06-23 DIAGNOSIS — E11.9 TYPE 2 DIABETES MELLITUS WITHOUT COMPLICATION, WITHOUT LONG-TERM CURRENT USE OF INSULIN (HCC): Primary | ICD-10-CM

## 2022-06-23 NOTE — TELEPHONE ENCOUNTER
Pt called the nurse line requesting a call back from PCP, regarding a script that was sent to the pharmacy  I called pt to clarify, per pt glucose monitoring kit FREESTYLE  Was not covered by insurance  Pt states she called the pharmacy and the one covered is the [id number]

## 2022-07-18 ENCOUNTER — OFFICE VISIT (OUTPATIENT)
Dept: FAMILY MEDICINE CLINIC | Facility: CLINIC | Age: 37
End: 2022-07-18

## 2022-07-18 VITALS
OXYGEN SATURATION: 97 % | HEIGHT: 61 IN | DIASTOLIC BLOOD PRESSURE: 80 MMHG | HEART RATE: 99 BPM | TEMPERATURE: 98.7 F | BODY MASS INDEX: 50.41 KG/M2 | WEIGHT: 267 LBS | RESPIRATION RATE: 16 BRPM | SYSTOLIC BLOOD PRESSURE: 130 MMHG

## 2022-07-18 DIAGNOSIS — K21.9 GASTROESOPHAGEAL REFLUX DISEASE, UNSPECIFIED WHETHER ESOPHAGITIS PRESENT: ICD-10-CM

## 2022-07-18 DIAGNOSIS — K21.9 GASTROESOPHAGEAL REFLUX DISEASE WITHOUT ESOPHAGITIS: ICD-10-CM

## 2022-07-18 DIAGNOSIS — E11.9 TYPE 2 DIABETES MELLITUS WITHOUT COMPLICATION, WITHOUT LONG-TERM CURRENT USE OF INSULIN (HCC): Primary | ICD-10-CM

## 2022-07-18 DIAGNOSIS — I10 HYPERTENSION, UNSPECIFIED TYPE: ICD-10-CM

## 2022-07-18 PROBLEM — R94.31 ABNORMAL ECG: Status: ACTIVE | Noted: 2021-10-12

## 2022-07-18 PROBLEM — N89.8 VAGINAL DISCHARGE: Status: ACTIVE | Noted: 2022-03-23

## 2022-07-18 PROCEDURE — 99214 OFFICE O/P EST MOD 30 MIN: CPT

## 2022-07-18 RX ORDER — KETOROLAC TROMETHAMINE 5 MG/ML
SOLUTION OPHTHALMIC
COMMUNITY
Start: 2022-06-21

## 2022-07-18 RX ORDER — FAMOTIDINE 20 MG/1
20 TABLET, FILM COATED ORAL 2 TIMES DAILY
Qty: 60 TABLET | Refills: 1 | Status: SHIPPED | OUTPATIENT
Start: 2022-07-18 | End: 2022-10-20 | Stop reason: SDUPTHER

## 2022-07-18 NOTE — ASSESSMENT & PLAN NOTE
Pt continues to experience heartburn  Has not tried Pepcid as ordered by previous PCP  - Trial Pepcid daily, can increase to BID if still experiencing symptoms    - Avoid trigger foods, consume smaller meals, avoid lying down for 2 hours after eating    - Encourage weight loss and exercise

## 2022-07-18 NOTE — ASSESSMENT & PLAN NOTE
Pt states she has started lisinopril but missed a few doses recently  BP at goal in office today: 130/80   - Encouraged better adherence to prescribed medication regimen   - Continue lisinopril 5 mg daily    - Recommend healthy, low-salt diet and daily exercise to help control BP

## 2022-07-18 NOTE — PROGRESS NOTES
[id number] [id number] PRACTICE MARYANN    NAME: Courtney Mills  AGE: 44 y o  SEX: female  : 1985     DATE: 2022     Assessment and Plan:     Problem List Items Addressed This Visit        Digestive    Gastroesophageal reflux disease     Pt continues to experience heartburn  Has not tried Pepcid as ordered by previous PCP  - Trial Pepcid daily, can increase to BID if still experiencing symptoms    - Avoid trigger foods, consume smaller meals, avoid lying down for 2 hours after eating    - Encourage weight loss and exercise  Relevant Medications    famotidine (PEPCID) 20 mg tablet       Endocrine    Type 2 diabetes mellitus without complication, without long-term current use of insulin ([facility] [address] ) - Primary       Lab Results   Component Value Date    HGBA1C 9 1 (H) 2022     Glucometer ordered at last appt on 22 not covered by insurance  Orders for covered brand sent to pharmacy on 22, pt has not picked up from pharmacy  Pt did start metformin at 500 mg daily but did not yet increase to BID dosing    - Increase metformin to 500 mg BID    - Obtain glucometer from pharmacy  Inform office if difficulty obtaining machine   - Record fasting blood sugars and bring log to next appt in one month, POCT A1c at that time  - Follow up with Weight Management as scheduled on 22  Cardiovascular and Mediastinum    Hypertension     Pt states she has started lisinopril but missed a few doses recently  BP at goal in office today: 130/80   - Encouraged better adherence to prescribed medication regimen   - Continue lisinopril 5 mg daily    - Recommend healthy, low-salt diet and daily exercise to help control BP  BMI Counseling: Body mass index is 50 45 kg/m²   The BMI is above normal  Nutrition recommendations include decreasing portion sizes, encouraging healthy choices of fruits and vegetables, limiting drinks that contain sugar and moderation in carbohydrate intake  Exercise recommendations include moderate physical activity 150 minutes/week  Patient referred to weight management  Rationale for BMI follow-up plan is due to patient being overweight or obese  First appt with Weight Management is scheduled for 7/21/22  Return in about 5 weeks (around 8/22/2022) for Annual physical, recheck DM  Chief Complaint:     Chief Complaint   Patient presents with    Follow-up     dm      History of Present Illness:     Sushil Mcdermott presented to the office today for routine follow up for chronic conditions  Denies any symptoms of high blood sugar, states she will get a headache if she feels that her blood pressure is high  Does not have a home BP cuff  Pt started both metformin and lisinopril, experiencing no side effects  First appt with Weight Management is this Thursday, next appt with ophthalmology is next week  Review of Systems:     Review of Systems   Constitutional: Negative for fatigue, fever and unexpected weight change  Eyes: Negative for visual disturbance  Respiratory: Negative for cough, chest tightness, shortness of breath and wheezing  Cardiovascular: Positive for leg swelling (bilateral feet, improved with increased walking)  Negative for chest pain and palpitations  Gastrointestinal: Positive for abdominal pain (heartburn) and constipation  Negative for diarrhea, nausea and vomiting  Endocrine: Negative for polyuria  Genitourinary: Negative for difficulty urinating  Neurological: Negative for dizziness, weakness, numbness and headaches  All other systems reviewed and are negative       Past Medical History:     Past Medical History:   Diagnosis Date    No known health problems       Past Surgical History:     Past Surgical History:   Procedure Laterality Date    NO PAST SURGERIES        Social History:     Social History     Socioeconomic History    Marital status: Single     Spouse name: None    Number of children: None    Years of education: None    Highest education level: None   Occupational History    None   Tobacco Use    Smoking status: Never Smoker    Smokeless tobacco: Never Used   Substance and Sexual Activity    Alcohol use: Never    Drug use: Never    Sexual activity: None   Other Topics Concern    None   Social History Narrative    None     Social Determinants of Health     Financial Resource Strain: Low Risk     Difficulty of Paying Living Expenses: Not hard at all   Food Insecurity: No Food Insecurity    Worried About Running Out of Food in the Last Year: Never true    [address] in the Last Year: Never true   Transportation Needs: No Transportation Needs    Lack of Transportation (Medical): No    Lack of Transportation (Non-Medical): No   Physical Activity: Not on file   Stress: Not on file   Social Connections: Not on file   Intimate Partner Violence: Not on file   Housing Stability: Not on file      Family History:     History reviewed  No pertinent family history  Current Medications:     Current Outpatient Medications   Medication Sig Dispense Refill    famotidine (PEPCID) 20 mg tablet Take 1 tablet (20 mg total) by mouth 2 (two) times a day 60 tablet 1    cetirizine (ZyrTEC) 10 mg tablet Take 1 tablet (10 mg total) by mouth in the morning  30 tablet 1    ketorolac (ACULAR) 0 5 % ophthalmic solution INSTILL 1 DROP IN LEFT EYE FOUR TIMES DAILY      Lancets (freestyle) lancets Use in the morning Use as instructed 100 each 1    lisinopril (ZESTRIL) 5 mg tablet Take 1 tablet (5 mg total) by mouth daily 30 tablet 2    metFORMIN (GLUCOPHAGE) 500 mg tablet Take 1 tablet (500 mg total) by mouth 2 (two) times a day with meals 60 tablet 2     No current facility-administered medications for this visit        Allergies:     No Known Allergies   Physical Exam:     /80 (BP Location: Right arm, Patient Position: Sitting, Cuff Size: Large)   Pulse 99 Temp 98 7 °F (37 1 °C) (Temporal)   Resp 16   Ht 5' 1" (1 549 m)   Wt 121 kg (267 lb)   LMP 07/05/2022 (Approximate)   SpO2 97%   Breastfeeding No   BMI 50 45 kg/m²     Physical Exam  Vitals reviewed  Constitutional:       General: She is not in acute distress  Appearance: She is morbidly obese  She is not ill-appearing or diaphoretic  HENT:      Head: Normocephalic and atraumatic  Right Ear: External ear normal       Left Ear: External ear normal       Nose: Nose normal    Eyes:      General: Lids are normal       Conjunctiva/sclera: Conjunctivae normal    Cardiovascular:      Rate and Rhythm: Normal rate and regular rhythm  Heart sounds: Normal heart sounds  Pulmonary:      Effort: Pulmonary effort is normal  No tachypnea  Breath sounds: Normal breath sounds  No decreased breath sounds or wheezing  Musculoskeletal:      Right lower leg: Edema (trace ) present  Left lower leg: Edema (trace) present  Skin:     General: Skin is warm and dry  Capillary Refill: Capillary refill takes less than 2 seconds  Neurological:      Mental Status: She is alert and oriented to person, place, and time  Motor: Motor function is intact  Gait: Gait is intact     Psychiatric:         Mood and Affect: Mood and affect normal           Antoni Conrad 33

## 2022-07-18 NOTE — ASSESSMENT & PLAN NOTE
Lab Results   Component Value Date    HGBA1C 9 1 (H) 05/19/2022     Glucometer ordered at last appt on 6/20/22 not covered by insurance  Orders for covered brand sent to pharmacy on 6/24/22, pt has not picked up from pharmacy  Pt did start metformin at 500 mg daily but did not yet increase to BID dosing    - Increase metformin to 500 mg BID    - Obtain glucometer from pharmacy  Inform office if difficulty obtaining machine   - Record fasting blood sugars and bring log to next appt in one month, POCT A1c at that time  - Follow up with Weight Management as scheduled on 7/21/22

## 2022-07-21 ENCOUNTER — CONSULT (OUTPATIENT)
Dept: BARIATRICS | Facility: CLINIC | Age: 37
End: 2022-07-21
Payer: MEDICARE

## 2022-07-21 VITALS
HEIGHT: 61 IN | SYSTOLIC BLOOD PRESSURE: 130 MMHG | RESPIRATION RATE: 18 BRPM | DIASTOLIC BLOOD PRESSURE: 74 MMHG | TEMPERATURE: 99.4 F | BODY MASS INDEX: 50.52 KG/M2 | WEIGHT: 267.6 LBS | HEART RATE: 90 BPM

## 2022-07-21 DIAGNOSIS — Z91.89 RISK FACTORS FOR OBSTRUCTIVE SLEEP APNEA: ICD-10-CM

## 2022-07-21 DIAGNOSIS — E11.9 TYPE 2 DIABETES MELLITUS WITHOUT COMPLICATION, WITHOUT LONG-TERM CURRENT USE OF INSULIN (HCC): ICD-10-CM

## 2022-07-21 DIAGNOSIS — I10 HYPERTENSION, UNSPECIFIED TYPE: ICD-10-CM

## 2022-07-21 DIAGNOSIS — K21.9 GASTROESOPHAGEAL REFLUX DISEASE WITHOUT ESOPHAGITIS: ICD-10-CM

## 2022-07-21 PROCEDURE — 99244 OFF/OP CNSLTJ NEW/EST MOD 40: CPT | Performed by: PHYSICIAN ASSISTANT

## 2022-07-21 RX ORDER — PREDNISOLONE ACETATE 10 MG/ML
1 SUSPENSION/ DROPS OPHTHALMIC DAILY
COMMUNITY
Start: 2022-07-20

## 2022-07-21 NOTE — ASSESSMENT & PLAN NOTE
-Discussed options of [facility]-Intensive Lifestyle Intervention Program, Very Low Calorie Diet-VLCD, Conservative Program, Mich-En-Y Gastric Bypass and Vertical Sleeve Gastrectomy and the role of weight loss medications   -Initial weight loss goal of 5-10% weight loss for improved health  -Screening labs  Recommend checking lab coverage before having labs drawn   - Labs reviewed from 5/19/22 all within acceptable limits except glucose/a1c  - STOP BANG-5/8-referred to sleep medicine    -Patient is interested in pursuing surgical intervention   Discussed surgery types, diet after surgery, avoidance of alcohol/pregnancy post op     -recommend continuing walking 2 times a week for health

## 2022-07-21 NOTE — PROGRESS NOTES
Assessment/Plan:    BMI 50 0-59 9, adult ([facility] [address] )  -Discussed options of [facility]-Intensive Lifestyle Intervention Program, Very Low Calorie Diet-VLCD, Conservative Program, Mich-En-Y Gastric Bypass and Vertical Sleeve Gastrectomy and the role of weight loss medications   -Initial weight loss goal of 5-10% weight loss for improved health  -Screening labs  Recommend checking lab coverage before having labs drawn   - Labs reviewed from 5/19/22 all within acceptable limits except glucose/a1c  - STOP BANG-5/8-referred to sleep medicine    -Patient is interested in pursuing surgical intervention  Discussed surgery types, diet after surgery, avoidance of alcohol/pregnancy post op     -recommend continuing walking 2 times a week for health     Hypertension  On lisinopril  -should improve with weight loss, dietary, and lifestyle changes      Type 2 diabetes mellitus without complication, without long-term current use of insulin (HCC)    Lab Results   Component Value Date    HGBA1C 9 1 (H) 05/19/2022   on metformin     -should improve with weight loss, dietary, and lifestyle changes      Gastroesophageal reflux disease  To start pepcid      Follow up in approximately 1 month with Surgical Dietician, Surgical  and Surgical   Diagnoses and all orders for this visit:    BMI 50 0-59 9, adult [facility])  -     Ambulatory Referral to Weight Management  -     Ambulatory Referral to Sleep Medicine; Future    Risk factors for obstructive sleep apnea  -     Ambulatory Referral to Sleep Medicine;  Future    Hypertension, unspecified type    Type 2 diabetes mellitus without complication, without long-term current use of insulin (HCC)    Gastroesophageal reflux disease without esophagitis    Other orders  -     prednisoLONE acetate (PRED FORTE) 1 % ophthalmic suspension; Administer 1 drop to both eyes in the morning          Subjective:   Chief Complaint   Patient presents with    Consult     MWM consult Patient ID: Herbert Jackson  is a 44 y o  female with excess weight/obesity here to pursue weight management  Past Medical History:   Diagnosis Date    Diabetes mellitus ([address] [address] )     Hypertension     No known health problems        HPI:  Here for consult  She is not interested in surgery  She does not intend to get pregnant soon but would like children at some time and feels it will help her   She also wants to improve her health    She noticed weight gain over last few years and when had vision problems  Just had cataract surgery in June  She did start to walk  She is trying to change her diet    She started metformin last month for diabetes    Obesity/Excess Weight:  Severity: Very Severe  Onset:  years    Modifiers: Diet and Exercise and Over the Counter Weight Loss Supplements  Contributing factors: Poor Food Choices, Insufficient Physical Activity and Stress/Emotional Eating  Associated symptoms: comorbid conditions, fatigue and increased joint pain    Goals: improve health  Hydration:  Alcohol:   Exercise:walking 2 times a week  Occupation:not     Colonoscopy-Not applicable    The following portions of the patient's history were reviewed and updated as appropriate:   She  has a past medical history of Diabetes mellitus ([address] [address] ), Hypertension, and No known health problems  She   Patient Active Problem List    Diagnosis Date Noted    Pre-op exam 05/19/2022    Type 2 diabetes mellitus without complication, without long-term current use of insulin ([address] [address] ) 05/19/2022    Allergic rhinitis 05/19/2022    Gastroesophageal reflux disease 05/19/2022    BMI 50 0-59 9, adult ([address] [address] ) 05/19/2022    Hypertension 05/19/2022    Vaginal discharge 03/23/2022    Abnormal ECG 10/12/2021     She  has a past surgical history that includes No past surgeries; Cataract extraction (Left, 06/08/2022); and Cataract extraction (Right, 11/10/2021)  Her family history includes Obesity in her mother    She  reports that she has never smoked  She has never used smokeless tobacco  She reports that she does not drink alcohol and does not use drugs  Current Outpatient Medications   Medication Sig Dispense Refill    famotidine (PEPCID) 20 mg tablet Take 1 tablet (20 mg total) by mouth 2 (two) times a day 60 tablet 1    ketorolac (ACULAR) 0 5 % ophthalmic solution INSTILL 1 DROP IN LEFT EYE FOUR TIMES DAILY      Lancets (freestyle) lancets Use in the morning Use as instructed 100 each 1    lisinopril (ZESTRIL) 5 mg tablet Take 1 tablet (5 mg total) by mouth daily 30 tablet 2    metFORMIN (GLUCOPHAGE) 500 mg tablet Take 1 tablet (500 mg total) by mouth 2 (two) times a day with meals 60 tablet 2    prednisoLONE acetate (PRED FORTE) 1 % ophthalmic suspension Administer 1 drop to both eyes in the morning      cetirizine (ZyrTEC) 10 mg tablet Take 1 tablet (10 mg total) by mouth in the morning  (Patient not taking: Reported on 7/21/2022) 30 tablet 1     No current facility-administered medications for this visit  Current Outpatient Medications on File Prior to Visit   Medication Sig    famotidine (PEPCID) 20 mg tablet Take 1 tablet (20 mg total) by mouth 2 (two) times a day    ketorolac (ACULAR) 0 5 % ophthalmic solution INSTILL 1 DROP IN LEFT EYE FOUR TIMES DAILY    Lancets (freestyle) lancets Use in the morning Use as instructed    lisinopril (ZESTRIL) 5 mg tablet Take 1 tablet (5 mg total) by mouth daily    metFORMIN (GLUCOPHAGE) 500 mg tablet Take 1 tablet (500 mg total) by mouth 2 (two) times a day with meals    prednisoLONE acetate (PRED FORTE) 1 % ophthalmic suspension Administer 1 drop to both eyes in the morning    cetirizine (ZyrTEC) 10 mg tablet Take 1 tablet (10 mg total) by mouth in the morning  (Patient not taking: Reported on 7/21/2022)    [DISCONTINUED] glucose monitoring kit (FREESTYLE) monitoring kit Use 1 each daily     No current facility-administered medications on file prior to visit       She has No Known Allergies       Review of Systems   Constitutional: Negative for chills and fever  HENT: Negative for sore throat  Respiratory: Negative for shortness of breath  Cardiovascular: Negative for chest pain and palpitations  Gastrointestinal: Negative for abdominal pain, constipation, diarrhea and vomiting  +GERD-uncontrolled just started famotidine     Genitourinary: Negative for difficulty urinating  Musculoskeletal: Positive for back pain  Negative for arthralgias  Skin: Negative for rash  Neurological: Negative for headaches  Psychiatric/Behavioral: Negative for dysphoric mood  The patient is not nervous/anxious  Objective:    /74 (BP Location: Left arm, Patient Position: Sitting, Cuff Size: Adult)   Pulse 90   Temp 99 4 °F (37 4 °C) (Tympanic)   Resp 18   Ht 5' 1 4" (1 56 m)   Wt 121 kg (267 lb 9 6 oz)   LMP 07/05/2022 (Approximate)   BMI 49 91 kg/m²     Physical Exam  Vitals and nursing note reviewed  Constitutional:       General: She is not in acute distress  Appearance: She is well-developed  She is obese  HENT:      Head: Normocephalic and atraumatic  Eyes:      Conjunctiva/sclera: Conjunctivae normal    Neck:      Thyroid: No thyromegaly  Pulmonary:      Effort: Pulmonary effort is normal  No respiratory distress  Skin:     Findings: No rash (visible)  Neurological:      Mental Status: She is alert and oriented to person, place, and time     Psychiatric:         Behavior: Behavior normal

## 2022-07-21 NOTE — ASSESSMENT & PLAN NOTE
Lab Results   Component Value Date    HGBA1C 9 1 (H) 05/19/2022   on metformin     -should improve with weight loss, dietary, and lifestyle changes

## 2022-07-22 DIAGNOSIS — I10 HYPERTENSION, UNSPECIFIED TYPE: ICD-10-CM

## 2022-07-22 DIAGNOSIS — E11.9 TYPE 2 DIABETES MELLITUS WITHOUT COMPLICATION, WITHOUT LONG-TERM CURRENT USE OF INSULIN (HCC): ICD-10-CM

## 2022-07-22 RX ORDER — LISINOPRIL 5 MG/1
5 TABLET ORAL DAILY
Qty: 30 TABLET | Refills: 2 | OUTPATIENT
Start: 2022-07-22

## 2022-07-22 NOTE — TELEPHONE ENCOUNTER
Pt contacted office requesting she needs glucometer and supplies       Also pt is requesting refills on    famotidine (PEPCID) 20 mg tablet    lisinopril (ZESTRIL) 5 mg tablet

## 2022-08-08 ENCOUNTER — OFFICE VISIT (OUTPATIENT)
Dept: SLEEP CENTER | Facility: CLINIC | Age: 37
End: 2022-08-08
Payer: MEDICARE

## 2022-08-08 VITALS
DIASTOLIC BLOOD PRESSURE: 90 MMHG | WEIGHT: 271 LBS | BODY MASS INDEX: 53.2 KG/M2 | HEART RATE: 87 BPM | HEIGHT: 60 IN | SYSTOLIC BLOOD PRESSURE: 144 MMHG

## 2022-08-08 DIAGNOSIS — G47.33 OSA (OBSTRUCTIVE SLEEP APNEA): ICD-10-CM

## 2022-08-08 DIAGNOSIS — Z91.89 RISK FACTORS FOR OBSTRUCTIVE SLEEP APNEA: Primary | ICD-10-CM

## 2022-08-08 DIAGNOSIS — I10 PRIMARY HYPERTENSION: ICD-10-CM

## 2022-08-08 PROCEDURE — 99244 OFF/OP CNSLTJ NEW/EST MOD 40: CPT | Performed by: PSYCHIATRY & NEUROLOGY

## 2022-08-08 NOTE — ASSESSMENT & PLAN NOTE
Blood pressure is elevated, she is asymptomatic  We reviewed the need to follow-up with her primary care doctor regarding this    Treating obstructive sleep apnea potentially help reduce blood pressure

## 2022-08-08 NOTE — PROGRESS NOTES
Review of Systems      Genitourinary need to urinate more than twice a night, excessive blood loss during menses, hot flashes at night and sleep problems that vary with menstrual cycle    Cardiology palpitations/fluttering feeling in the chest and ankle/leg swelling   Gastrointestinal frequent heartburn/acid reflux and abdominal pain or cramping that disturb sleep    Neurology frequent headaches, awaken with headache, need to move extremities, muscle weakness, numbness/tingling of an extremity, forgetfulness, poor concentration or confusion, , difficulty with memory and balance problems   Constitutional fatigue, excessive sweating at night and weight change   Integumentary rash or dry skin and itching   Psychiatry mood change   Musculoskeletal joint pain, muscle aches, back pain, legs twitching/jerking, sciatica and leg cramps   Pulmonary shortness of breath with activity, chest tightness, wheezing, frequent cough, snoring and difficulty breathing when lying flat    ENT throat clearing and ringing in ears   Endocrine excessive thirst and frequent urination   Hematological none

## 2022-08-08 NOTE — PROGRESS NOTES
Assessment/Plan:      1  Risk factors for obstructive sleep apnea  Assessment & Plan:  Ms  Chiquita Kaye has high risk for obstructive sleep apnea given loud snoring, gasping in sleep, excessive daytime sleepiness, morbid obesity, and large neck circumference, and hypertension  We reviewed the pathophysiology of obstructive sleep apnea and will plan for a split night sleep study  She is open to using CPAP if indicated  We reviewed the connection between obstructive sleep apnea and bariatric surgery, the need to treat obstructive sleep apnea if present to lower perioperative risk    Orders:  -     Ambulatory Referral to Sleep Medicine  -     Split Study; Future; Expected date: 08/08/2022    2  BMI 50 0-59 9, adult [facility])  -     Ambulatory Referral to Sleep Medicine    3  MARIA LUISA (obstructive sleep apnea)  -     Split Study; Future; Expected date: 08/08/2022    4  Primary hypertension  Assessment & Plan:  Blood pressure is elevated, she is asymptomatic  We reviewed the need to follow-up with her primary care doctor regarding this  Treating obstructive sleep apnea potentially help reduce blood pressure             Subjective:      Patient ID: Og Jamison is a 44 y o  female  This is a 27-year-old woman referred for a sleep assessment due to risk for obstructive sleep apnea  She follows with the weight management center, surgical intervention is being considered  She has a past medical history including hypertension and diabetes  She has never had a sleep study before  She feels her throat is dry and there is a blockage, sleeps prone as a result  She snores loudly  She is not aware of witnessed apneas  Sometimes wakes with headaches   + nasal congestion    She has a variable sleep schedule , goes to bed from 2 am- 5 am Wakes 11 am on the early side or sometimes in the afternoon  Wakes up to urinate 3-4 times during the night    She is not usually refreshed when she wakes  She notes sleepiness when inactive  Gerhardt Sep Dozes often right now  She is in bed a lot and dozes  Also naps after a long walk  She does not drive at present, dozes a lot as a passenger    She has not had sleepwalking  [place] Sleepiness Scale:     Sitting and reading: High chance of dozing  Watching TV: High chance of dozing  Sitting, inactive in a public place (e g  a theatre or a meeting):  Moderate chance of dozing  As a passenger in a car for an hour without a break: High chance of dozing  Lying down to rest in the afternoon when circumstances permit: High chance of dozing  Sitting and talking to someone: Slight chance of dozing  Sitting quietly after a lunch without alcohol: High chance of dozing  In a car, while stopped for a few minutes in traffic: Would never doze  Total score: 18     The following portions of the patient's history were reviewed and updated as appropriate: allergies, current medications, past family history, past medical history, past social history, past surgical history, and problem list     Review of Systems    Genitourinary need to urinate more than twice a night, excessive blood loss during menses, hot flashes at night and sleep problems that vary with menstrual cycle    Cardiology palpitations/fluttering feeling in the chest and ankle/leg swelling   Gastrointestinal frequent heartburn/acid reflux and abdominal pain or cramping that disturb sleep    Neurology frequent headaches, awaken with headache, need to move extremities, muscle weakness, numbness/tingling of an extremity, forgetfulness, poor concentration or confusion, , difficulty with memory and balance problems   Constitutional fatigue, excessive sweating at night and weight change   Integumentary rash or dry skin and itching   Psychiatry mood change   Musculoskeletal joint pain, muscle aches, back pain, legs twitching/jerking, sciatica and leg cramps   Pulmonary shortness of breath with activity, chest tightness, wheezing, frequent cough, snoring and difficulty breathing when lying flat    ENT throat clearing and ringing in ears   Endocrine excessive thirst and frequent urination   Hematological none         Objective:        /90   Pulse 87   Ht 5' (1 524 m)   Wt 123 kg (271 lb)   BMI 52 93 kg/m²     Physical Exam  Constitutional:       Appearance: She is obese  HENT:      Head: Normocephalic and atraumatic  Mouth/Throat:      Mouth: Mucous membranes are moist    Eyes:      Extraocular Movements: Extraocular movements intact  Pupils: Pupils are equal, round, and reactive to light  Cardiovascular:      Rate and Rhythm: Normal rate  Pulses: Normal pulses  Heart sounds: Normal heart sounds  Pulmonary:      Effort: Pulmonary effort is normal       Breath sounds: Normal breath sounds  Musculoskeletal:      Right lower leg: No edema  Left lower leg: No edema  Neurological:      Mental Status: She is alert  Psychiatric:         Mood and Affect: Mood normal          Behavior: Behavior normal          Thought Content: Thought content normal          Judgment: Judgment normal         17 5 in neck circumference  mallampati 3-4 airway, redundant soft palate, no macroglossia  No overbite       Data reviewed-notes from weight management, blood work including basic metabolic panel which shows a serum carbon dioxide level of 28 millimoles per L-this can be associated with obesity hypoventilation syndrome  TSH was normal in May of this year

## 2022-08-08 NOTE — ASSESSMENT & PLAN NOTE
Ms Chiquita Kaye has high risk for obstructive sleep apnea given loud snoring, gasping in sleep, excessive daytime sleepiness, morbid obesity, and large neck circumference, and hypertension  We reviewed the pathophysiology of obstructive sleep apnea and will plan for a split night sleep study  She is open to using CPAP if indicated    We reviewed the connection between obstructive sleep apnea and bariatric surgery, the need to treat obstructive sleep apnea if present to lower perioperative risk

## 2022-08-08 NOTE — PATIENT INSTRUCTIONS
Your blood pressure was high today, please followup with your primary care doctor      Nursing Support:  When: Monday through Friday 7A-5PM except holidays  Where: Our direct line is [phone]  If you are having a true emergency please call 911  In the event that the line is busy or it is after hours please leave a voice message and we will return your call  Please speak clearly, leaving your full name, birth date, best number to reach you and the reason for your call  Medication refills: We will need the name of the medication, the dosage, the ordering provider, whether you get a 30 or 90 day refill, and the pharmacy name and address  Medications will be ordered by the provider only  Nurses cannot call in prescriptions  Please allow 7 days for medication refills  Physician requested updates: If your provider requested that you call with an update after starting medication, please be ready to provide us the medication and dosage, what time you take your medication, the time you attempt to fall asleep, time you fall asleep, when you wake up, and what time you get out of bed  Sleep Study Results: We will contact you with sleep study results and/or next steps after the physician has reviewed your testing  Sleep Apnea   AMBULATORY CARE:   Sleep apnea  is a condition that causes you to stop breathing often during sleep  Types of sleep apnea:   Obstructive sleep apnea (MARIA LUISA)  is the most common kind  The muscles and tissues around your throat relax and block air from passing through  Obesity, use of alcohol or cigarettes, or a family history are common causes  MARIA LUISA may increase your risk for complications after surgery  Central sleep apnea (CSA)  means your brain does not send signals to the muscles that control breathing  You do not take a breath even though your airway is open  Common causes include medical conditions such as heart failure, being older than 40, or use of opioids      Complex (or mixed) sleep apnea  means you have both obstructive and central sleep apnea  Common signs and symptoms:   Loud snoring or long pauses in breathing    Feeling sleepy, slow, and tired during the day    Snorting, gasping, or choking while you sleep, and waking up suddenly because of these    Feeling irritable during the day    Dry mouth or a headache in the mornings    Heavy night sweating    A hard time thinking, remembering things, or focusing on your tasks the following day    Call your local emergency number (911 in the [address] [facility]) if:   You have chest pain or trouble breathing  Call your doctor if:   You have new or worsening signs or symptoms  You have questions or concerns about your condition or care  Treatment  depends on the kind of apnea you have  A mouth device  may be needed if you have mild sleep apnea  These are designed to keep your throat open  Ask your dentist or healthcare provider about the best mouth device for you  A machine  may be used to help you get more air during sleep  A mask may be placed over your nose and mouth, or just your nose  The mask is hooked to the machine  You will get air through the mask  A continuous positive airway pressure (CPAP) machine  is used to keep your airway open during sleep  The machine blows a gentle stream of air into the mask when you breathe  This helps keep your airway open so you can breathe more regularly  Extra oxygen may be given through the machine  A bilevel positive airway pressure (BiPAP) machine  gives air but lowers the pressure when you breathe out  An adaptive servo-ventilator (ASV)  is a machine that learns your usual breathing pattern  Then, it uses pressure to give you air and prevent stops in your breathing  Surgery  to expand your airway or remove extra tissues may be needed  Surgery is usually only considered if other treatments do not work      Manage or prevent sleep apnea:   Reach and maintain a healthy weight  Ask your healthcare provider what a healthy weight is for you  Ask him or her to help you create a safe weight loss plan if you are overweight  Even a small goal of a 10% weight loss can improve your symptoms  Do not smoke  Nicotine and other chemicals in cigarettes and cigars can cause lung damage  Ask your healthcare provider for information if you currently smoke and need help to quit  E-cigarettes or smokeless tobacco still contain nicotine  Talk to your healthcare provider before you use these products  Do not drink alcohol or take sedative medicine before you go to sleep  Alcohol and sedatives can relax the muscles and tissues around your throat  This can block the airflow to your lungs  Sleep on your side or use pillows designed to prevent sleep apnea  This prevents your tongue or other tissues from blocking your throat  You can also raise the head of your bed  Follow up with your doctor or specialist as directed: You may need to have blood tests during your follow-up visits  Work with your provider to find the right breathing support equipment and settings for you  Write down your questions so you remember to ask them during your visits  © Copyright [employer] 2022 Information is for End User's use only and may not be sold, redistributed or otherwise used for commercial purposes  All illustrations and images included in CareNotes® are the copyrighted property of A D A M , Inc  or [facility] Marilu Ellis   The above information is an  only  It is not intended as medical advice for individual conditions or treatments  Talk to your doctor, nurse or pharmacist before following any medical regimen to see if it is safe and effective for you  It is very important to avoid driving while drowsy, this can be very dangerous or even cause serious injury or death  If sleepy, it is not safe to get behind the wheel    If you are driving and feels sleepy, it is very important to pull over right away  Even losing control of the car for a split second can be deadly  If you feel you cannot control when sleepiness occurs and cannot prevented, it is important to not drive at all until this improves  Please let me know if you experience this as it is very important

## 2022-08-22 ENCOUNTER — OFFICE VISIT (OUTPATIENT)
Dept: FAMILY MEDICINE CLINIC | Facility: CLINIC | Age: 37
End: 2022-08-22

## 2022-08-22 ENCOUNTER — APPOINTMENT (OUTPATIENT)
Dept: LAB | Facility: CLINIC | Age: 37
End: 2022-08-22
Payer: MEDICARE

## 2022-08-22 VITALS
OXYGEN SATURATION: 98 % | DIASTOLIC BLOOD PRESSURE: 84 MMHG | HEIGHT: 60 IN | TEMPERATURE: 97.2 F | BODY MASS INDEX: 52.85 KG/M2 | SYSTOLIC BLOOD PRESSURE: 132 MMHG | HEART RATE: 85 BPM | RESPIRATION RATE: 14 BRPM | WEIGHT: 269.2 LBS

## 2022-08-22 DIAGNOSIS — H20.13 CHRONIC IRIDOCYCLITIS OF BOTH EYES: Primary | ICD-10-CM

## 2022-08-22 DIAGNOSIS — Z11.4 SCREENING FOR HIV (HUMAN IMMUNODEFICIENCY VIRUS): ICD-10-CM

## 2022-08-22 DIAGNOSIS — E11.9 TYPE 2 DIABETES MELLITUS WITHOUT COMPLICATION, WITHOUT LONG-TERM CURRENT USE OF INSULIN (HCC): ICD-10-CM

## 2022-08-22 DIAGNOSIS — I10 PRIMARY HYPERTENSION: ICD-10-CM

## 2022-08-22 DIAGNOSIS — Z91.89 RISK FACTORS FOR OBSTRUCTIVE SLEEP APNEA: ICD-10-CM

## 2022-08-22 DIAGNOSIS — Z11.59 NEED FOR HEPATITIS C SCREENING TEST: ICD-10-CM

## 2022-08-22 DIAGNOSIS — K21.9 GASTROESOPHAGEAL REFLUX DISEASE WITHOUT ESOPHAGITIS: ICD-10-CM

## 2022-08-22 DIAGNOSIS — I10 HYPERTENSION, UNSPECIFIED TYPE: ICD-10-CM

## 2022-08-22 DIAGNOSIS — Z00.00 ANNUAL PHYSICAL EXAM: Primary | ICD-10-CM

## 2022-08-22 LAB
BASOPHILS # BLD AUTO: 0.04 THOUSANDS/ΜL (ref 0–0.1)
BASOPHILS NFR BLD AUTO: 1 % (ref 0–1)
CREAT UR-MCNC: 583 MG/DL
CRP SERPL QL: 9.4 MG/L
EOSINOPHIL # BLD AUTO: 0.23 THOUSAND/ΜL (ref 0–0.61)
EOSINOPHIL NFR BLD AUTO: 5 % (ref 0–6)
ERYTHROCYTE [DISTWIDTH] IN BLOOD BY AUTOMATED COUNT: 12.5 % (ref 11.6–15.1)
ERYTHROCYTE [SEDIMENTATION RATE] IN BLOOD: 32 MM/HOUR (ref 0–19)
EST. AVERAGE GLUCOSE BLD GHB EST-MCNC: 189 MG/DL
HBA1C MFR BLD: 8.2 %
HCT VFR BLD AUTO: 42.1 % (ref 34.8–46.1)
HCV AB SER QL: NORMAL
HGB BLD-MCNC: 14.1 G/DL (ref 11.5–15.4)
IMM GRANULOCYTES # BLD AUTO: 0.02 THOUSAND/UL (ref 0–0.2)
IMM GRANULOCYTES NFR BLD AUTO: 0 % (ref 0–2)
LYMPHOCYTES # BLD AUTO: 1.58 THOUSANDS/ΜL (ref 0.6–4.47)
LYMPHOCYTES NFR BLD AUTO: 33 % (ref 14–44)
MCH RBC QN AUTO: 31.5 PG (ref 26.8–34.3)
MCHC RBC AUTO-ENTMCNC: 33.5 G/DL (ref 31.4–37.4)
MCV RBC AUTO: 94 FL (ref 82–98)
MICROALBUMIN UR-MCNC: 110 MG/L (ref 0–20)
MICROALBUMIN/CREAT 24H UR: 19 MG/G CREATININE (ref 0–30)
MONOCYTES # BLD AUTO: 0.36 THOUSAND/ΜL (ref 0.17–1.22)
MONOCYTES NFR BLD AUTO: 7 % (ref 4–12)
NEUTROPHILS # BLD AUTO: 2.63 THOUSANDS/ΜL (ref 1.85–7.62)
NEUTS SEG NFR BLD AUTO: 54 % (ref 43–75)
NRBC BLD AUTO-RTO: 0 /100 WBCS
PLATELET # BLD AUTO: 275 THOUSANDS/UL (ref 149–390)
PMV BLD AUTO: 11.7 FL (ref 8.9–12.7)
RBC # BLD AUTO: 4.48 MILLION/UL (ref 3.81–5.12)
SL AMB POCT HEMOGLOBIN AIC: 8 (ref ?–6.5)
WBC # BLD AUTO: 4.86 THOUSAND/UL (ref 4.31–10.16)

## 2022-08-22 PROCEDURE — 86140 C-REACTIVE PROTEIN: CPT

## 2022-08-22 PROCEDURE — 86803 HEPATITIS C AB TEST: CPT

## 2022-08-22 PROCEDURE — 99395 PREV VISIT EST AGE 18-39: CPT

## 2022-08-22 PROCEDURE — 36415 COLL VENOUS BLD VENIPUNCTURE: CPT

## 2022-08-22 PROCEDURE — 86430 RHEUMATOID FACTOR TEST QUAL: CPT

## 2022-08-22 PROCEDURE — 83036 HEMOGLOBIN GLYCOSYLATED A1C: CPT

## 2022-08-22 PROCEDURE — 86038 ANTINUCLEAR ANTIBODIES: CPT

## 2022-08-22 PROCEDURE — 81374 HLA I TYPING 1 ANTIGEN LR: CPT

## 2022-08-22 PROCEDURE — 86592 SYPHILIS TEST NON-TREP QUAL: CPT

## 2022-08-22 PROCEDURE — 85652 RBC SED RATE AUTOMATED: CPT

## 2022-08-22 PROCEDURE — 87389 HIV-1 AG W/HIV-1&-2 AB AG IA: CPT

## 2022-08-22 PROCEDURE — 82043 UR ALBUMIN QUANTITATIVE: CPT

## 2022-08-22 PROCEDURE — 82164 ANGIOTENSIN I ENZYME TEST: CPT

## 2022-08-22 PROCEDURE — 86618 LYME DISEASE ANTIBODY: CPT

## 2022-08-22 PROCEDURE — 86480 TB TEST CELL IMMUN MEASURE: CPT

## 2022-08-22 PROCEDURE — 85025 COMPLETE CBC W/AUTO DIFF WBC: CPT

## 2022-08-22 PROCEDURE — 86200 CCP ANTIBODY: CPT

## 2022-08-22 PROCEDURE — 82570 ASSAY OF URINE CREATININE: CPT

## 2022-08-22 RX ORDER — LISINOPRIL 5 MG/1
5 TABLET ORAL DAILY
Qty: 30 TABLET | Refills: 2 | Status: SHIPPED | OUTPATIENT
Start: 2022-08-22 | End: 2022-10-07 | Stop reason: SDUPTHER

## 2022-08-22 RX ORDER — BLOOD-GLUCOSE METER
EACH MISCELLANEOUS
COMMUNITY
Start: 2022-08-18

## 2022-08-22 RX ORDER — BLOOD SUGAR DIAGNOSTIC
STRIP MISCELLANEOUS
COMMUNITY
Start: 2022-08-18 | End: 2022-10-17 | Stop reason: SDUPTHER

## 2022-08-22 NOTE — PROGRESS NOTES
[address] [facility]    NAME: Eliseo Gibson  AGE: 44 y o  SEX: female  : 1985     DATE: 2022     Assessment and Plan:     Problem List Items Addressed This Visit        Digestive    Gastroesophageal reflux disease     Improved with medication and dietary changes  - Continue current plan  Endocrine    Type 2 diabetes mellitus without complication, without long-term current use of insulin ([address] )       Lab Results   Component Value Date    HGBA1C 8 0 (A) 2022    HGBA1C 9 1 (H) 2022    HGBA1C 8 1 (H) 10/25/2021     Pt taking and tolerating metformin with resultant improvement in A1c  Pt has very recently started checking blood sugar at home  - Advised increasing metformin or adding second agent for further improvement in blood sugar, however patient declined, would like to continue to make lifestyle changes and repeat A1c in 3 months    - Continue home blood sugar monitoring and bring log to next appt  - Up to date with diabetic eye exams  Relevant Medications    lisinopril (ZESTRIL) 5 mg tablet    Other Relevant Orders    POCT hemoglobin A1c (Completed)    CBC and differential    Microalbumin / creatinine urine ratio       Cardiovascular and Mediastinum    Hypertension     BP at goal in office today: 132/84   - Continue lisinopril 5 mg daily  - Continue to implement dietary changes and increase exercise to aid in control  Relevant Medications    lisinopril (ZESTRIL) 5 mg tablet       Other    BMI 50 0-59 9, adult (HCC)     Pt is pursuing surgical option at this time due to multiple co-morbidities  - Continue plan as established by Weight Management  Risk factors for obstructive sleep apnea     Will be having sleep study, next appt with Sleep Medicine tomorrow  - Continue following recommendations as per Sleep             Other Visit Diagnoses     Annual physical exam    -  Primary    Screening for HIV (human immunodeficiency virus)        Relevant Orders    HIV 1/2 Antigen/Antibody (4th Generation) w Reflex [facility]    Need for hepatitis C screening test        Relevant Orders    Hepatitis C Antibody ([facility])          Immunizations and preventive care screenings were discussed with patient today  Appropriate education was printed on patient's after visit summary  Counseling:  Dental Health: discussed importance of regular tooth brushing, flossing, and dental visits  · Exercise: the importance of regular exercise/physical activity was discussed  Recommend exercise 3-5 times per week for at least 30 minutes  Return in 3 months (on 11/22/2022) for Recheck DM, HTN, MARIA LUISA  Chief Complaint:     Chief Complaint   Patient presents with    Physical Exam     43 y/o     Diabetes      History of Present Illness:     Adult Annual Physical   Di Harley presents to the office for a comprehensive physical exam  The patient reports no problems  She plans to complete blood work today for her ophthalmologist for evaluation of chronic uveitis  Pt reported making dietary changes and adhering to prescribed medication regimen  Recently started checking blood sugar, last night 202, this morning 204  Pt states she is up to date with cervical cancer screening, sees [facility]  Diet and Physical Activity  · Diet/Nutrition: well-balanced diet, eating a lot of salad, brown rice  · Exercise: walking 2-3x per week  General Health  · Sleep: had initial evaluation with Sleep Medicine, testing to follow  · Hearing: normal - bilateral   · Vision: goes for regular eye exams, s/p bilateral cataract surgery, chronic uveitis  · Dental: regular dental visits  /GYN Health  · Last menstrual period: 2 weeks ago  · Contraceptive method: none, not currently sexually active     · History of STDs?: no      Review of Systems:     Review of Systems   Constitutional: Negative for fatigue, fever and unexpected weight change  HENT: Positive for sore throat (dry throat at night)  Negative for congestion  Eyes: Negative for visual disturbance  Respiratory: Positive for shortness of breath  Negative for cough and wheezing  Cardiovascular: Negative for chest pain, palpitations and leg swelling  Gastrointestinal: Positive for constipation (occasional)  Negative for abdominal pain, diarrhea, nausea and vomiting  Genitourinary: Negative for difficulty urinating and menstrual problem  Neurological: Negative for dizziness and headaches  Psychiatric/Behavioral: Positive for sleep disturbance  Negative for dysphoric mood  The patient is not nervous/anxious  All other systems reviewed and are negative       Past Medical History:     Past Medical History:   Diagnosis Date    Diabetes mellitus ([facility] )     Hypertension       Past Surgical History:     Past Surgical History:   Procedure Laterality Date    CATARACT EXTRACTION Left 06/08/2022    CATARACT EXTRACTION Right 11/10/2021    NO PAST SURGERIES        Social History:     Social History     Socioeconomic History    Marital status: Single     Spouse name: None    Number of children: None    Years of education: None    Highest education level: None   Occupational History    None   Tobacco Use    Smoking status: Never Smoker    Smokeless tobacco: Never Used   Vaping Use    Vaping Use: Never used   Substance and Sexual Activity    Alcohol use: Never    Drug use: Never    Sexual activity: None   Other Topics Concern    None   Social History Narrative    None     Social Determinants of Health     Financial Resource Strain: Low Risk     Difficulty of Paying Living Expenses: Not hard at all   Food Insecurity: No Food Insecurity    Worried About Running Out of Food in the Last Year: Never true    Brittaney of Food in the Last Year: Never true   Transportation Needs: No Transportation Needs    Lack of Transportation (Medical): No    Lack of Transportation (Non-Medical): No   Physical Activity: Not on file   Stress: Not on file   Social Connections: Not on file   Intimate Partner Violence: Not on file   Housing Stability: Not on file      Family History:     Family History   Problem Relation Age of Onset    Obesity Mother     Sleep apnea Neg Hx       Current Medications:     Current Outpatient Medications   Medication Sig Dispense Refill    lisinopril (ZESTRIL) 5 mg tablet Take 1 tablet (5 mg total) by mouth daily 30 tablet 2    Blood Glucose Monitoring Suppl (OneTouch Verio Flex System) w/Device KIT TEST EVERY MORNING      cetirizine (ZyrTEC) 10 mg tablet Take 1 tablet (10 mg total) by mouth in the morning  (Patient not taking: No sig reported) 30 tablet 1    famotidine (PEPCID) 20 mg tablet Take 1 tablet (20 mg total) by mouth 2 (two) times a day 60 tablet 1    ketorolac (ACULAR) 0 5 % ophthalmic solution INSTILL 1 DROP IN LEFT EYE FOUR TIMES DAILY      Lancets (freestyle) lancets Use in the morning Use as instructed 100 each 1    metFORMIN (GLUCOPHAGE) 500 mg tablet Take 1 tablet (500 mg total) by mouth 2 (two) times a day with meals 60 tablet 2    OneTouch Verio test strip TEST EVERY MORNING      prednisoLONE acetate (PRED FORTE) 1 % ophthalmic suspension Administer 1 drop to both eyes in the morning       No current facility-administered medications for this visit  Allergies:     No Known Allergies   Physical Exam:     /84 (BP Location: Left arm, Patient Position: Sitting, Cuff Size: Adult)   Pulse 85   Temp (!) 97 2 °F (36 2 °C) (Temporal)   Resp 14   Ht 5' (1 524 m)   Wt 122 kg (269 lb 3 2 oz)   LMP 08/08/2022 (Within Days)   SpO2 98%   BMI 52 57 kg/m²     Physical Exam  Vitals reviewed  Constitutional:       General: She is not in acute distress  Appearance: She is morbidly obese  She is not ill-appearing or diaphoretic  HENT:      Head: Normocephalic and atraumatic        Right Ear: Tympanic membrane, ear canal and external ear normal       Left Ear: Tympanic membrane, ear canal and external ear normal       Nose: Nose normal       Mouth/Throat:      Mouth: Mucous membranes are moist       Pharynx: Oropharynx is clear  Eyes:      General: Lids are normal       Conjunctiva/sclera: Conjunctivae normal       Pupils: Pupils are equal, round, and reactive to light  Neck:      Thyroid: No thyromegaly or thyroid tenderness  Cardiovascular:      Rate and Rhythm: Normal rate and regular rhythm  Pulses: Normal pulses  Heart sounds: Normal heart sounds  No murmur heard  Pulmonary:      Effort: Pulmonary effort is normal  No tachypnea  Breath sounds: Normal breath sounds  No decreased breath sounds or wheezing  Abdominal:      General: Bowel sounds are normal  There is no distension  Palpations: Abdomen is soft  Tenderness: There is no abdominal tenderness  Musculoskeletal:      Right lower leg: No edema  Left lower leg: No edema  Lymphadenopathy:      Cervical: No cervical adenopathy  Skin:     General: Skin is warm and dry  Neurological:      Mental Status: She is alert and oriented to person, place, and time     Psychiatric:         Mood and Affect: Mood and affect normal           Ray Antoni Larson 33

## 2022-08-22 NOTE — PATIENT INSTRUCTIONS

## 2022-08-22 NOTE — ASSESSMENT & PLAN NOTE
Will be having sleep study, next appt with Sleep Medicine tomorrow  - Continue following recommendations as per Sleep

## 2022-08-22 NOTE — ASSESSMENT & PLAN NOTE
Lab Results   Component Value Date    HGBA1C 8 0 (A) 08/22/2022    HGBA1C 9 1 (H) 05/19/2022    HGBA1C 8 1 (H) 10/25/2021     Pt taking and tolerating metformin with resultant improvement in A1c  Pt has very recently started checking blood sugar at home  - Advised increasing metformin or adding second agent for further improvement in blood sugar, however patient declined, would like to continue to make lifestyle changes and repeat A1c in 3 months    - Continue home blood sugar monitoring and bring log to next appt  - Up to date with diabetic eye exams

## 2022-08-22 NOTE — ASSESSMENT & PLAN NOTE
BP at goal in office today: 132/84   - Continue lisinopril 5 mg daily  - Continue to implement dietary changes and increase exercise to aid in control

## 2022-08-22 NOTE — ASSESSMENT & PLAN NOTE
Pt is pursuing surgical option at this time due to multiple co-morbidities  - Continue plan as established by Weight Management

## 2022-08-23 ENCOUNTER — OFFICE VISIT (OUTPATIENT)
Dept: BARIATRICS | Facility: CLINIC | Age: 37
End: 2022-08-23

## 2022-08-23 VITALS
DIASTOLIC BLOOD PRESSURE: 94 MMHG | SYSTOLIC BLOOD PRESSURE: 146 MMHG | WEIGHT: 269.6 LBS | TEMPERATURE: 98.1 F | HEART RATE: 87 BPM | BODY MASS INDEX: 50.9 KG/M2 | RESPIRATION RATE: 16 BRPM | HEIGHT: 61 IN

## 2022-08-23 DIAGNOSIS — I10 PRIMARY HYPERTENSION: ICD-10-CM

## 2022-08-23 DIAGNOSIS — E11.9 TYPE 2 DIABETES MELLITUS WITHOUT COMPLICATION, WITHOUT LONG-TERM CURRENT USE OF INSULIN (HCC): ICD-10-CM

## 2022-08-23 DIAGNOSIS — Z01.818 PRE-OP EXAM: ICD-10-CM

## 2022-08-23 DIAGNOSIS — E66.9 OBESITY, UNSPECIFIED: Primary | ICD-10-CM

## 2022-08-23 LAB
ACE SERPL-CCNC: 23 U/L (ref 14–82)
B BURGDOR IGG+IGM SER-ACNC: <0.2 AI
CCP AB SER IA-ACNC: 1.1
HIV 1+2 AB+HIV1 P24 AG SERPL QL IA: NORMAL
RHEUMATOID FACT SER QL LA: NEGATIVE
RPR SER QL: NORMAL
RYE IGE QN: NEGATIVE

## 2022-08-23 PROCEDURE — RECHECK

## 2022-08-23 RX ORDER — LORATADINE 10 MG/1
10 TABLET ORAL DAILY
COMMUNITY
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
## 2022-08-23 NOTE — PROGRESS NOTES
Bariatric Nutrition Assessment Note    Type of surgery    Preop  Surgery Date: TBD tentative 4/2023  Surgeon: Dr Missael Welsh  44 y o   female     Wt with BMI of 25: 132 lbs  Pre-Op Excess Wt: 137 lbs  Blood pressure 146/94, pulse 87, temperature 98 1 °F (36 7 °C), temperature source Tympanic, resp  rate 16, height 5' 1" (1 549 m), weight 122 kg (269 lb 9 6 oz), last menstrual period 08/08/2022, not currently breastfeeding  Body mass index is 50 94 kg/m²     [address] [facility] [id number] to lose 1-2 lbs/week (sedentary)                                 Estimated protein needs 60-65g                                 Estimated fluid needs 64 oz    Weight History   Onset of Obesity: Adult  Family history of obesity: No  Wt Loss Attempts: OTC meds/supplements  Self Created Diets (Portion Control, Healthy Food Choices, etc )  Maximum Wt Lost: 5 lbs    Review of History and Medications   Past Medical History:   Diagnosis Date    Diabetes mellitus ([facility] [address]     Hypertension     Obesity     Seasonal allergies      Past Surgical History:   Procedure Laterality Date    CATARACT EXTRACTION Left 06/08/2022    CATARACT EXTRACTION Right 11/10/2021    COLONOSCOPY  2013     Social History     Socioeconomic History    Marital status: Single     Spouse name: None    Number of children: None    Years of education: None    Highest education level: None   Occupational History    None   Tobacco Use    Smoking status: Never Smoker    Smokeless tobacco: Never Used   Vaping Use    Vaping Use: Never used   Substance and Sexual Activity    Alcohol use: Never    Drug use: Never    Sexual activity: Not Currently     Birth control/protection: Abstinence   Other Topics Concern    None   Social History Narrative    None     Social Determinants of Health     Financial Resource Strain: Low Risk     Difficulty of Paying Living Expenses: Not hard at all   Food Insecurity: No Food Insecurity    Worried About Running Out of Food in the Last Year: Never true    Brittaney of Food in the Last Year: Never true   Transportation Needs: No Transportation Needs    Lack of Transportation (Medical): No    Lack of Transportation (Non-Medical): No   Physical Activity: Not on file   Stress: Not on file   Social Connections: Not on file   Intimate Partner Violence: Not on file   Housing Stability: Not on file       Current Outpatient Medications:     Blood Glucose Monitoring Suppl (OneTouch Verio Flex System) w/Device KIT, TEST EVERY MORNING, Disp: , Rfl:     famotidine (PEPCID) 20 mg tablet, Take 1 tablet (20 mg total) by mouth 2 (two) times a day (Patient taking differently: Take 20 mg by mouth 2 (two) times a day), Disp: 60 tablet, Rfl: 1    ketorolac (ACULAR) 0 5 % ophthalmic solution, INSTILL 1 DROP IN LEFT EYE FOUR TIMES DAILY, Disp: , Rfl:     Lancets (freestyle) lancets, Use in the morning Use as instructed, Disp: 100 each, Rfl: 1    lisinopril (ZESTRIL) 5 mg tablet, Take 1 tablet (5 mg total) by mouth daily, Disp: 30 tablet, Rfl: 2    loratadine (CLARITIN) 10 mg tablet, Take 10 mg by mouth daily, Disp: , Rfl:     metFORMIN (GLUCOPHAGE) 500 mg tablet, Take 1 tablet (500 mg total) by mouth 2 (two) times a day with meals, Disp: 60 tablet, Rfl: 2    OneTouch Verio test strip, TEST EVERY MORNING, Disp: , Rfl:     prednisoLONE acetate (PRED FORTE) 1 % ophthalmic suspension, Administer 1 drop to both eyes in the morning, Disp: , Rfl:     cetirizine (ZyrTEC) 10 mg tablet, Take 1 tablet (10 mg total) by mouth in the morning   (Patient not taking: Reported on 8/23/2022), Disp: 30 tablet, Rfl: 1  Food Intake and Lifestyle Assessment   Food Intake Assessment completed via usual diet recall  Breakfast: breakfast sandwich or hot cereal or whatever in the mood for  Snack: chips or cookies   Lunch: rice and meat or fish  Snack: chips, likes grapefruit and melon  Dinner: rice, meat, or salad with bagged lettuce,  grilled chicken or shrimp,  Snack: bread or mixed nuts, bowl of cereal  Beverage intake: water and tea  Protein supplement: 0  Estimated protein intake per day: 75g  Estimated fluid intake per day: 64 oz  Meals eaten away from home: seldom  Typical meal pattern: 3 meals per day and 3 snacks per day  Eating Behaviors: Large portion sizes and Frequent snacking/ grazing  Food allergies or intolerances: No Known Allergies  Cultural or Yarsani considerations:     Physical Assessment  Physical Activity  Types of exercise: Walking  Current physical limitations: pain in legs    Psychosocial Assessment   Support systems: Godmother  Socioeconomic factors: lives alone, currently not working    Nutrition Diagnosis  Diagnosis: Overweight / Obesity (NC-3 3)  Related to: Not ready for diet / lifestyle change, Physical inactivity and Excessive energy intake  As Evidenced by: BMI >25     Nutrition Prescription: Recommend the following diet  Low fat, Low sugar and High protein    Interventions and Teaching   Discussed pre-op and post-op nutrition guidelines  Patient educated and handouts provided    Surgical changes to stomach / GI  Capacity of post-surgery stomach  Diet progression  Adequate hydration  Sugar and fat restriction to decrease "dumping syndrome"  Fat restriction to decrease steatorrhea  Expected weight loss  Weight loss plateaus/ possibility of weight regain  Exercise  Suggestions for pre-op diet  Nutrition considerations after surgery  Protein supplements  Meal planning and preparation  Appropriate carbohydrate, protein, and fat intake, and food/fluid choices to maximize safe weight loss, nutrient intake, and tolerance   Dietary and lifestyle changes  Possible problems with poor eating habits  Intuitive eating  Techniques for self monitoring and keeping daily food journal  Potential for food intolerance after surgery, and ways to deal with them including: lactose intolerance, nausea, reflux, vomiting, diarrhea, food intolerance, appetite changes, gas  Vitamin / Mineral supplementation of Multivitamin with minerals and Vitamin D  Post-operative pregnancy guidelines    Education provided to: patient    Barriers to learning: No barriers identified  Readiness to change: preparation    Prior research on procedure: internet and discussed with provider    Comprehension: verbalizes understanding     Expected Compliance: good  Recommendations  Pt is an appropriate candidate for surgery   Yes    Evaluation / Monitoring  Dietitian to Monitor: Eating pattern as discussed Body weight Lab values Physical activity    Goals  Food journal, Exercise 30 minutes 5 times per week, Complete lession plans 1-6, Eat 3 meals per day and Eliminate mindless snacking    Time Spent:   1 Hour

## 2022-08-24 LAB
GAMMA INTERFERON BACKGROUND BLD IA-ACNC: >10 IU/ML
M TB IFN-G BLD-IMP: ABNORMAL
M TB IFN-G CD4+ BCKGRND COR BLD-ACNC: 4.07 IU/ML
M TB IFN-G CD4+ BCKGRND COR BLD-ACNC: 4.23 IU/ML
MITOGEN IGNF BCKGRD COR BLD-ACNC: 3.21 IU/ML

## 2022-08-31 LAB — HLA-B27 QL NAA+PROBE: NEGATIVE

## 2022-09-01 ENCOUNTER — TELEPHONE (OUTPATIENT)
Dept: FAMILY MEDICINE CLINIC | Facility: CLINIC | Age: 37
End: 2022-09-01

## 2022-09-01 ENCOUNTER — OFFICE VISIT (OUTPATIENT)
Dept: DENTISTRY | Facility: CLINIC | Age: 37
End: 2022-09-01

## 2022-09-01 VITALS — DIASTOLIC BLOOD PRESSURE: 81 MMHG | SYSTOLIC BLOOD PRESSURE: 139 MMHG | HEART RATE: 77 BPM | TEMPERATURE: 98.4 F

## 2022-09-01 DIAGNOSIS — Z01.20 ENCOUNTER FOR DENTAL EXAMINATION: ICD-10-CM

## 2022-09-01 DIAGNOSIS — K04.7 PERIAPICAL ABSCESS WITHOUT SINUS: Primary | ICD-10-CM

## 2022-09-01 PROBLEM — K05.30 PERIODONTITIS: Status: ACTIVE | Noted: 2022-09-01

## 2022-09-01 PROCEDURE — D0150 COMPREHENSIVE ORAL EVALUATION - NEW OR ESTABLISHED PATIENT: HCPCS

## 2022-09-01 PROCEDURE — D0210 INTRAORAL - COMPLETE SERIES OF RADIOGRAPHIC IMAGES: HCPCS

## 2022-09-01 RX ORDER — AMOXICILLIN 500 MG/1
500 CAPSULE ORAL EVERY 8 HOURS SCHEDULED
Qty: 21 CAPSULE | Refills: 0 | Status: SHIPPED | OUTPATIENT
Start: 2022-09-01 | End: 2022-09-08

## 2022-09-01 NOTE — PROGRESS NOTES
Dental procedures in this visit     - INTRAORAL - COMPLETE SERIES OF RADIOGRAPHIC IMAGES (Completed)     Service provider: Davey Desai [facility], [address] [facility]     Billing provider: Denia Piña DMD     - COMPREHENSIVE ORAL EVALUATION - NEW OR ESTABLISHED PATIENT (Completed)     Service provider: Ana Bruner DDS     Billing provider: Denia Piña DMD     Subjective   Patient ID: Maria A Duran is a 44 y o  female  Chief Complaint   Patient presents with    Comprehensive Exam     Initial encounter   ASA 2-3  Attempting to get A1C under better control  Patient presented for initial encounter  Pain  0   CC  Needs to get teeth checked  Broken tooth # 30  "decades" since last dental visit    HPI  The following portions of the chart were reviewed this encounter and updated as appropriate:    No text in SmartText           Objective   Soft Tissue Exam  No soft tissue changes in this visit      Dental Exam Edi Hair    Radiographic Interpretation:   Associated radiographs for today's visit were reviewed and finding(s) were discussed with the patient  Findings include: FMX taken  Hard Tissue Exam:  Decay noted - see charting and treatment plan and draining fistula # 30  patient reports asymptommatic  Perio:   · Perio diagnosis: Generalized (>30%)  Moderate (3-4mm attachment loss) Chronic Periodontitist  · Perio treatment recommendations: Scaling and Root Planning   · Frequency: Every 4 months  · Fluoride Recommendations: None     Explained to patient she needs to get root tips # 30 extracted ASAP  We need to extr root tip # 2 as well  We will pre auth for SRP 4 quads  Patient is diabetic     Dr Edi Hair prescribed antibiotics for infection # 30      NV1  extr  NV 2 SRP 4 quads  Need to preauth    NV3  modesta as charted  NV4 refer to [facility] for ext of third molars as needed    Davey Desai RD, PHDHP discussed findings associated with the patient's periodontal and caries status      Assessment/Plan Visit Dx (refresh)   Encounter Diagnoses   Name Primary?     Encounter for dental examination     Periapical abscess without sinus Yes

## 2022-09-01 NOTE — TELEPHONE ENCOUNTER
Patient came in and stated that Dental office needs a letter stating she is diabetic       [phone]

## 2022-09-06 ENCOUNTER — OFFICE VISIT (OUTPATIENT)
Dept: DENTISTRY | Facility: CLINIC | Age: 37
End: 2022-09-06

## 2022-09-06 VITALS — SYSTOLIC BLOOD PRESSURE: 134 MMHG | HEART RATE: 90 BPM | TEMPERATURE: 97.1 F | DIASTOLIC BLOOD PRESSURE: 68 MMHG

## 2022-09-06 DIAGNOSIS — K02.9 DENTAL CARIES: ICD-10-CM

## 2022-09-06 DIAGNOSIS — K04.7 DENTAL ABSCESS: Primary | ICD-10-CM

## 2022-09-06 PROCEDURE — D7140 EXTRACTION, ERUPTED TOOTH OR EXPOSED ROOT (ELEVATION AND/OR FORCEPS REMOVAL): HCPCS | Performed by: DENTIST

## 2022-09-06 NOTE — DENTAL PROCEDURE DETAILS
Due for SRP once approved  [facility], [place], ASA 2 - Patient with mild systemic disease with no functional limitations  Elevated HbA1C with improving trend, patient has abx already prescribed  Patient reports pain level of 3  Patient presents for extraction of 2 and 30  Potential complications reviewed with patient including post-op pain, swelling, infection, inability to open fully, damage to adjacent teeth, and prolonged numbness  Consent signed by patient and provider  Anesthesia: 1 0 carpule Lidocaine HCL, 2% with Epinephrine 1:100,000, given via IANB  1 0 carpule total of 4% Articaine given for upper and lower buccal infiltration and upper palatal infiltration  Tx: Gingiva , Elevated, Delivered with Forceps, and Socket Curetted  Sutures: No Sutures Placed adequate hemostasis achieved    Post-Op: Patient given Post-Op Instruction Written and Verbally and Gauze    Advised patient to complete course of Abx previously prescribed      NV: SRP

## 2022-09-09 ENCOUNTER — OFFICE VISIT (OUTPATIENT)
Dept: BARIATRICS | Facility: CLINIC | Age: 37
End: 2022-09-09
Payer: MEDICARE

## 2022-09-09 VITALS
WEIGHT: 266 LBS | DIASTOLIC BLOOD PRESSURE: 84 MMHG | BODY MASS INDEX: 50.22 KG/M2 | HEART RATE: 94 BPM | SYSTOLIC BLOOD PRESSURE: 126 MMHG | TEMPERATURE: 99 F | HEIGHT: 61 IN

## 2022-09-09 DIAGNOSIS — E66.01 MORBID (SEVERE) OBESITY DUE TO EXCESS CALORIES (HCC): Primary | ICD-10-CM

## 2022-09-09 DIAGNOSIS — E11.69 DIABETES MELLITUS TYPE 2 IN OBESE (HCC): ICD-10-CM

## 2022-09-09 DIAGNOSIS — E66.9 DIABETES MELLITUS TYPE 2 IN OBESE (HCC): ICD-10-CM

## 2022-09-09 PROCEDURE — 99203 OFFICE O/P NEW LOW 30 MIN: CPT | Performed by: SURGERY

## 2022-09-09 RX ORDER — MULTIVIT-MIN/IRON FUM/FOLIC AC 7.5 MG-4
1 TABLET ORAL DAILY
COMMUNITY

## 2022-09-09 RX ORDER — OMEGA-3S/DHA/EPA/FISH OIL/D3 300MG-1000
2000 CAPSULE ORAL DAILY
COMMUNITY

## 2022-09-09 NOTE — PROGRESS NOTES
BARIATRIC CONSULT-INITIAL - BARIATRIC SURGERY  Tomasa Fernandes 44 y o  female MRN: [id number]  Unit/Bed#:  Encounter: [id number]      HPI:  Tomasa Fernandes is a 44 y o  female who presents with morbid obesity to discuss weight loss options  Review of Systems    Historical Information   Past Medical History:   Diagnosis Date    Diabetes mellitus ([facility] [address]     Hypertension     Obesity     Seasonal allergies      Past Surgical History:   Procedure Laterality Date    CATARACT EXTRACTION Left 06/08/2022    CATARACT EXTRACTION Right 11/10/2021    COLONOSCOPY  2013     Social History   Social History     Substance and Sexual Activity   Alcohol Use Never     Social History     Substance and Sexual Activity   Drug Use Never     Social History     Tobacco Use   Smoking Status Never Smoker   Smokeless Tobacco Never Used     Family History: non-contributory    Meds/Allergies   all medications and allergies reviewed  No Known Allergies    Objective       Current Vitals:   Blood Pressure: 126/84 (09/09/22 1422)  Pulse: 94 (09/09/22 1422)  Temperature: 99 °F (37 2 °C) (09/09/22 1422)  Temp Source: Tympanic (09/09/22 1422)  Height: 5' 1" (154 9 cm) (09/09/22 1422)  Weight - Scale: 121 kg (266 lb) (09/09/22 1422)  Body mass index is 50 26 kg/m²  Invasive Devices  Report    None                 Physical Exam    Lab Results: I have personally reviewed pertinent lab results  Imaging: I have personally reviewed pertinent reports  EKG, Pathology, and Other Studies: I have personally reviewed pertinent reports  Code Status: [unfilled]  Advance Directive and Living Will:      Power of :    POLST:      Assessment/PLAN:            Patient has a long history of morbid obesity and is presenting to discuss the surgical weight loss options  Despite the patient best efforts patient was unable to lose any meaningful or sustainable weight using nonsurgical means  We had a long discussion regarding all the surgical weight-loss options at our disposal at this point and reviewed the risks and benefits of each procedure in details as it relates to her age, BMI and medical conditions  Patient elected to undergo VANESSA in one or two stages depending on intraoperative findings    Risks and benefits were explained to the patient  We also discussed the importance and need of a preoperative workup to make sure that the patient can undergo the procedure safely  Preoperative workup includes sleep apnea screening, cardiac evaluation, nutrition/psych and preoperative EGD  Risks and benefits of all the preoperative diagnostic tests were discussed with the patient including but not limited to the upper endoscopy  Alternatives to surgery and alternative forms of surgery were also explained  Postsurgical commitment and aftercare programs were discussed and explained to the patient in details   In terms of comorbidities patient suffers mostly of   Past Medical History:   Diagnosis Date    Diabetes mellitus ([facility] [address]     Hypertension     Obesity     Seasonal allergies        I informed the patient that the rate of resolution of comorbid conditions following weight loss surgery is between 60 and 90% depending on the severity of the specific medical condition  I discussed and educated the patient regarding the different components of our multidisciplinary program and the importance of compliance and follow-up in the postoperative period  All questions answered  Patient understands risks and benefits  An image of the procedure was also shown to the patient  After showing the image we discussed all the technical aspects of the procedure and also the potential complications including but not limited to gastrointestinal perforation, leak, obstruction, stricture and hemorrhage  I spent 30 min with the patient more than 50% of the time was spent educating the patient and coordinating care

## 2022-09-16 ENCOUNTER — OFFICE VISIT (OUTPATIENT)
Dept: DENTISTRY | Facility: CLINIC | Age: 37
End: 2022-09-16

## 2022-09-16 VITALS — SYSTOLIC BLOOD PRESSURE: 135 MMHG | HEART RATE: 74 BPM | DIASTOLIC BLOOD PRESSURE: 95 MMHG | TEMPERATURE: 98.5 F

## 2022-09-16 DIAGNOSIS — K05.6 PERIODONTAL DISEASE: Primary | ICD-10-CM

## 2022-09-16 PROCEDURE — D4341 PERIODONTAL SCALING AND ROOT PLANING - 4 OR MORE TEETH PER QUADRANT: HCPCS | Performed by: DENTAL HYGIENIST

## 2022-09-16 NOTE — PROGRESS NOTES
Dental procedures in this visit     - PERIODONTAL SCALING AND ROOT PLANING - 4 OR MORE TEETH PER QUADRANT UR (Completed)     Service provider: Dania Olvera [facility]     Billing provider: Cindy Carbone DDS     - PERIODONTAL SCALING AND ROOT PLANING - 4 OR MORE TEETH PER QUADRANT LR (Completed)     Service provider: Dania Olvera [facility]     Billing provider: Cindy Carbone DDS     Subjective   Patient ID: Hesham Robledo is a 44 y o  female  Chief Complaint   Patient presents with   Maryuri Buckle     UR/LR     SCALING AND ROOT PLANING     ASA:  II  Pain:   0  Reviewed M/H;   HTN, Diabetes - A1C - 240 this morning    SC/RP:   UR/LR   Quad scaling and root planning  Applied Topical Anesthetic,   Administered  _2__ carpules,  Long/ Short  Needle, Lidocaine HCL 2 % and Epi 1 7 mL 1:100,000, Infiltration, IANB  Type of Treatment:  Used Ultrasonic and Hand Scaling, Flossed, Polished, Subgingival Irrigation - post tx - Chlorhexidine  Reviewed OHI  - Brush 2X/day and Floss 1X/day    Oral Hygiene:  Poor   Plaque: Moderate    Calculus: Moderate  Bleeding: Moderate   Stain:  None      Treatment Plan:  no changes to tx plan       Dr  Exam:  Resident Dr Ana Maria Luna gave anesthesia today                 Referral:  No referral given  Gave follow-up directions    NV1:  SC/RP - UL/LL - 75 min  NV2:  Rest 4, 5 - 75 min  NV2:  post SRP eval - 45 min  NV3:  3 month perio maintenance - 60 min

## 2022-10-06 ENCOUNTER — TELEPHONE (OUTPATIENT)
Dept: FAMILY MEDICINE CLINIC | Facility: CLINIC | Age: 37
End: 2022-10-06

## 2022-10-06 ENCOUNTER — OFFICE VISIT (OUTPATIENT)
Dept: BARIATRICS | Facility: CLINIC | Age: 37
End: 2022-10-06

## 2022-10-06 VITALS — BODY MASS INDEX: 50.2 KG/M2 | WEIGHT: 265.9 LBS | HEIGHT: 61 IN

## 2022-10-06 DIAGNOSIS — E66.9 OBESITY, UNSPECIFIED: ICD-10-CM

## 2022-10-06 DIAGNOSIS — E11.9 TYPE 2 DIABETES MELLITUS WITHOUT COMPLICATION, WITHOUT LONG-TERM CURRENT USE OF INSULIN (HCC): ICD-10-CM

## 2022-10-06 DIAGNOSIS — E11.9 TYPE 2 DIABETES MELLITUS WITHOUT COMPLICATION, WITHOUT LONG-TERM CURRENT USE OF INSULIN (HCC): Primary | ICD-10-CM

## 2022-10-06 DIAGNOSIS — I10 HYPERTENSION, UNSPECIFIED TYPE: ICD-10-CM

## 2022-10-06 PROCEDURE — RECHECK

## 2022-10-06 NOTE — TELEPHONE ENCOUNTER
Pt needs refill ran out of them    lisinopril (ZESTRIL) 5 mg tablet    metFORMIN (GLUCOPHAGE) 500 mg tablet

## 2022-10-06 NOTE — PROGRESS NOTES
Bariatric Follow Up Nutrition Note    Preop 2/6 wt checks  Preop Program    Type of surgery  Preop  Surgery Date: TBD    Surgeon: Dr Dean Molina  44 y o   female  Height 5' 1" (1 549 m), weight 121 kg (265 lb 14 4 oz), not currently breastfeeding  [id number] to lose 1-2 lbs/week (sedentary)                                 Estimated protein needs 60-65g                                 Estimated fluid needs 64 oz        Review of History and Medications   Past Medical History:   Diagnosis Date    Diabetes mellitus ([facility] [address]     Hypertension     Obesity     Seasonal allergies      Past Surgical History:   Procedure Laterality Date    CATARACT EXTRACTION Left 06/08/2022    CATARACT EXTRACTION Right 11/10/2021    COLONOSCOPY  2013     Social History     Socioeconomic History    Marital status: Single     Spouse name: Not on file    Number of children: Not on file    Years of education: Not on file    Highest education level: Not on file   Occupational History    Not on file   Tobacco Use    Smoking status: Never Smoker    Smokeless tobacco: Never Used   Vaping Use    Vaping Use: Never used   Substance and Sexual Activity    Alcohol use: Never    Drug use: Never    Sexual activity: Not Currently     Birth control/protection: Abstinence   Other Topics Concern    Not on file   Social History Narrative    Not on file     Social Determinants of Health     Financial Resource Strain: Low Risk     Difficulty of Paying Living Expenses: Not hard at all   Food Insecurity: No Food Insecurity    Worried About Running Out of Food in the Last Year: Never true    [address] in the Last Year: Never true   Transportation Needs: No Transportation Needs    Lack of Transportation (Medical): No    Lack of Transportation (Non-Medical):  No   Physical Activity: Not on file   Stress: Not on file   Social Connections: Not on file   Intimate Partner Violence: Not on file   Housing Stability: Not on file       Current Outpatient Medications:     Blood Glucose Monitoring Suppl (OneTouch Verio Flex System) w/Device KIT, TEST EVERY MORNING, Disp: , Rfl:     cetirizine (ZyrTEC) 10 mg tablet, Take 1 tablet (10 mg total) by mouth in the morning , Disp: 30 tablet, Rfl: 1    cholecalciferol (VITAMIN D3) 400 units tablet, Take 2,000 Units by mouth daily, Disp: , Rfl:     famotidine (PEPCID) 20 mg tablet, Take 1 tablet (20 mg total) by mouth 2 (two) times a day (Patient not taking: No sig reported), Disp: 60 tablet, Rfl: 1    ketorolac (ACULAR) 0 5 % ophthalmic solution, INSTILL 1 DROP IN LEFT EYE FOUR TIMES DAILY, Disp: , Rfl:     Lancets (freestyle) lancets, Use in the morning Use as instructed, Disp: 100 each, Rfl: 1    lisinopril (ZESTRIL) 5 mg tablet, Take 1 tablet (5 mg total) by mouth daily, Disp: 30 tablet, Rfl: 2    loratadine (CLARITIN) 10 mg tablet, Take 10 mg by mouth daily, Disp: , Rfl:     metFORMIN (GLUCOPHAGE) 500 mg tablet, Take 1 tablet (500 mg total) by mouth 2 (two) times a day with meals, Disp: 60 tablet, Rfl: 2    Multiple Vitamins-Minerals (multivitamin with minerals) tablet, Take 1 tablet by mouth daily, Disp: , Rfl:     OneTouch Verio test strip, TEST EVERY MORNING, Disp: , Rfl:     prednisoLONE acetate (PRED FORTE) 1 % ophthalmic suspension, Administer 1 drop to both eyes in the morning, Disp: , Rfl:     Food Intake and Lifestyle Assessment   Food Intake Assessment completed via usual diet recall  Breakfast: skips and have protein shake about 10  Snack: granola bar or 0   Lunch: protein, rice  Snack: 0  Dinner: 8pm salad with chicken or granola bar if didn't have earlier  Snack: 0  Beverage intake: water and decaf coffee/tea  Diet texture/stage: regular  Protein supplement: 1 Ensure Max daily  Estimated protein intake per day: >70g  Estimated fluid intake per day: 64 oz  Meals eaten away from home: seldom  Typical meal pattern: 2-3 meals per day and 1-2 snacks per day  Eating Behaviors: Appropriate portion sizes and Does not drink with meals, eating rice only once daily, chewing food well    Food allergies or intolerances: none  Cultural or Mu-ism considerations:     Physical Assessment      Physical Activity  Types of exercise: Walking 2 hours 2 times/week  Current physical limitations: pain in legs better    Psychosocial Assessment   Support systems: Godmother  Socioeconomic factors: Lives with cousin, not working currently    Nutrition Diagnosis  Diagnosis: Overweight / Obesity (NC-3 3)  Related to: Excessive energy intake and Altered GI function  As Evidenced by: BMI >25     Interventions and Teaching   Patient educated on post-op nutrition guidelines  Patient educated and handouts provided    Surgical changes to stomach / GI  Capacity of post-surgery stomach  Diet progression  Adequate hydration  Sugar and fat restriction to decrease "dumping syndrome"  Fat restriction to decrease steatorrhea  Expected weight loss  Weight loss plateaus/ possibility of weight regain  Exercise  Suggestions for pre-op diet  Nutrition considerations after surgery  Protein supplements  Meal planning and preparation  Appropriate carbohydrate, protein, and fat intake, and food/fluid choices to maximize safe weight loss, nutrient intake, and tolerance   Dietary and lifestyle changes  Possible problems with poor eating habits  Intuitive eating  Techniques for self monitoring and keeping daily food journal  Potential for food intolerance after surgery, and ways to deal with them including: lactose intolerance, nausea, reflux, vomiting, diarrhea, food intolerance, appetite changes, gas  Vitamin / Mineral supplementation of Multivitamin with minerals and Vitamin D  Post-operative pregnancy guidelines    Education provided to: patient    Barriers to learning: No barriers identified    Readiness to change: preparation    Comprehension: verbalizes understanding     Expected Compliance: good    Goals  Food journal, Exercise 30 minutes 5 times per week, Complete lession plans 1-6 and Eat 3 meals per day     Time Spent:   30 Minutes

## 2022-10-07 RX ORDER — LISINOPRIL 5 MG/1
5 TABLET ORAL DAILY
Qty: 90 TABLET | Refills: 1 | Status: SHIPPED | OUTPATIENT
Start: 2022-10-07

## 2022-10-10 ENCOUNTER — OFFICE VISIT (OUTPATIENT)
Dept: DENTISTRY | Facility: CLINIC | Age: 37
End: 2022-10-10

## 2022-10-10 VITALS — HEART RATE: 84 BPM | DIASTOLIC BLOOD PRESSURE: 93 MMHG | SYSTOLIC BLOOD PRESSURE: 134 MMHG | TEMPERATURE: 98.5 F

## 2022-10-10 DIAGNOSIS — Z01.20 ENCOUNTER FOR DENTAL EXAMINATION: Primary | ICD-10-CM

## 2022-10-10 PROCEDURE — D4341 PERIODONTAL SCALING AND ROOT PLANING - 4 OR MORE TEETH PER QUADRANT: HCPCS | Performed by: DENTAL HYGIENIST

## 2022-10-10 NOTE — PROGRESS NOTES
Dental procedures in this visit   •  - PERIODONTAL SCALING AND ROOT PLANING - 4 OR MORE TEETH PER QUADRANT LL (Completed)     Service provider: Blanca Veliz [address]     Billing provider: Nirav Hutson DDS   •  - PERIODONTAL SCALING AND ROOT PLANING - 4 OR MORE TEETH PER QUADRANT UL (Completed)     Service provider: Blanca Veliz     Billing provider: Nirav Hutson DDS     Subjective   Patient ID: Rena Barnes is a 44 y o  female  Chief Complaint   Patient presents with   • Srp     UL/LL     SCALING AND ROOT PLANING     ASA:  II  Pain:  0  Reviewed M/H    SC/RP:   UL/LL   Quad scaling and root planning  Applied Topical Anesthetic,   Administered  _1__ carpules,  Long Needle, Lidocaine HCL 2 % and Epi 1 7 mL 1:100,000,   __1___ carpule , Short Needle,  Septocaine (articaine HCI ) and Epi 4 % and  1:100,000 1 7 mL, Infiltration, IANB   Type of Treatment:  Used Ultrasonic and Hand Scaling, Flossed, Polished, Subgingival Irrigation - post tx - Chlorhexidine  Reviewed OHI  - Brush 2X/day and Floss 1X/day    Oral Hygiene:  Poor   Plaque: Moderate    Calculus: Moderate   Bleeding: Moderate     Stain:  None     Treatment Plan:  no changes to tx plan       Dr  Exam:  Resident Dr Lindsay Olvera  gave anesthesia today                 Referral:  No referral given  Gave follow-up directions    NV1:  Rest 4, 5 - 90 min  NV2:  post SRP eval - 45 min  NV3:  3 month perio maintenance - 60 min

## 2022-10-13 ENCOUNTER — OFFICE VISIT (OUTPATIENT)
Dept: DENTISTRY | Facility: CLINIC | Age: 37
End: 2022-10-13

## 2022-10-13 ENCOUNTER — TELEPHONE (OUTPATIENT)
Dept: FAMILY MEDICINE CLINIC | Facility: CLINIC | Age: 37
End: 2022-10-13

## 2022-10-13 VITALS — TEMPERATURE: 96.8 F | SYSTOLIC BLOOD PRESSURE: 150 MMHG | DIASTOLIC BLOOD PRESSURE: 95 MMHG

## 2022-10-13 DIAGNOSIS — K02.9 CARIES: Primary | ICD-10-CM

## 2022-10-13 NOTE — PROGRESS NOTES
Composite Filling    Dot Kang presents for #4MO and #5DO composite filling  PMH reviewed, no changes  ASA type 2 significant for diabetes  She had a protein shake to eat this morning and did not take her diabetes medications because she takes them at night  Blood glucose today was checked and was 152 mg/dl  Applied topical benzocaine, administered 1 carps 2% lido 1:100k epi via local infiltration of #4 and #5  Prepped tooth #4MO and #5DO together with 245 carbide on high speed  Caries removed with round carbide on slow speed  Placed tofflemire/palodent matrix  Isolation with cotton rolls and dri-angles    Placed tofelmire on 4 and palodent matrix with garison on #5  Etched with 37% H2PO4, rinse, dry  Applied Adhese with 20 second scrub once, gentle air dry and light cured for 10s  Restored #5-DO with Tetric bulk edilson shade A2 and light cured  Removed garison and tofelmire  Adjusted excess on  #5 with finishing burs  Placed palodent matrix with garison ring and wedge on #4  Repeated bonding steps and  restored #4-MO with Tetric bulk edilson shade A2 and light cured  Refined with finishing burs, polished with enhance point  Verified occlusion and contacts  Pt left satisfied and ambulatory        NV: #31 OB composite

## 2022-10-14 NOTE — DENTAL PROCEDURE DETAILS
Composite Filling    Adán Martins presents for #4MO and #5DO composite filling  PMH reviewed, no changes  ASA type 2 significant for diabetes  She had a protein shake to eat this morning and did not take her diabetes medications because she takes them at night  Blood glucose today was checked and was 152 mg/dl  Applied topical benzocaine, administered 1 carps 2% lido 1:100k epi via local infiltration of #4 and #5  Prepped tooth #4MO and #5DO together with 245 carbide on high speed  Caries removed with round carbide on slow speed  Placed tofflemire/palodent matrix  Isolation with cotton rolls and dri-angles    Placed tofelmire on 4 and palodent matrix with garison on #5  Etched with 37% H2PO4, rinse, dry  Applied Adhese with 20 second scrub once, gentle air dry and light cured for 10s  Restored #5-DO with Tetric bulk edilson shade A2 and light cured  Removed garison and tofelmire  Adjusted excess on  #5 with finishing burs  Placed palodent matrix with garison ring and wedge on #4  Repeated bonding steps and  restored #4-MO with Tetric bulk edilson shade A2 and light cured  Refined with finishing burs, polished with enhance point  Verified occlusion and contacts  Pt left satisfied and ambulatory        NV: #31 OB composite

## 2022-10-17 DIAGNOSIS — E11.9 TYPE 2 DIABETES MELLITUS WITHOUT COMPLICATION, WITHOUT LONG-TERM CURRENT USE OF INSULIN (HCC): ICD-10-CM

## 2022-10-18 RX ORDER — BLOOD SUGAR DIAGNOSTIC
1 STRIP MISCELLANEOUS DAILY
Qty: 100 STRIP | Refills: 2 | Status: SHIPPED | OUTPATIENT
Start: 2022-10-18

## 2022-10-18 RX ORDER — LANCETS 28 GAUGE
EACH MISCELLANEOUS DAILY
Qty: 100 EACH | Refills: 2 | Status: SHIPPED | OUTPATIENT
Start: 2022-10-18

## 2022-10-20 DIAGNOSIS — K21.9 GASTROESOPHAGEAL REFLUX DISEASE, UNSPECIFIED WHETHER ESOPHAGITIS PRESENT: ICD-10-CM

## 2022-10-21 RX ORDER — FAMOTIDINE 20 MG/1
20 TABLET, FILM COATED ORAL 2 TIMES DAILY
Qty: 60 TABLET | Refills: 1 | Status: SHIPPED | OUTPATIENT
Start: 2022-10-21

## 2022-11-03 ENCOUNTER — OFFICE VISIT (OUTPATIENT)
Dept: BARIATRICS | Facility: CLINIC | Age: 37
End: 2022-11-03

## 2022-11-03 VITALS — WEIGHT: 263.7 LBS | BODY MASS INDEX: 49.83 KG/M2

## 2022-11-03 DIAGNOSIS — E66.01 OBESITY, CLASS III, BMI 40-49.9 (MORBID OBESITY) (HCC): Primary | ICD-10-CM

## 2022-11-03 NOTE — PROGRESS NOTES
Tung Rogers  is a 44 y o    female    :  1985    Patient presented for Pre-surgical weight check  [address] number 3 of 6  Starting weight: 269 6   Submission Target:   262 Today's weight:     263 7     Discussed preparations for surgery and lifestyle change  Still having inflammation in her eyes as they are seeing inflammation and want to do another surgery  She can see now so there is great improvement for her  Here eyes have been very bad for two years and she is seeing again  Mental health and coping- She is currenty still living with her male cousin but she wants more privacy and it is his place  She is applying for low in [place] housing  She does not be stressed and worried, but her cousin can be disrespectful  She has tried to talk to him about their contract  Discussed differences in expectations  She is trying to stand up for herself respectfully  Discussed problem ownership  Self- soothing - she goes to her room and tries to calm herself when see sees that their discussion is getting heated  Eating Behaviors - She is having a protein shake instead of a meal sometimes  She stopped eating late and is planning her meal times  Social situations - She is not going out because she doesn't drive and has to [employer] to get errands  Activity -  She is walking twice a week  Discussed increasing her activity  Rest and sleep -      Progress toward program requirements  Workflow:  · Psych and/or D+A Clearance: n/a  · Surgeon Appt : done  · EGD :   · Support Group: needs  · PCP Letter: done  · Cardiac Risk Assessment: needs  · Sleep Studies:   · Bloodwork: needs  · Nicotine test: n/a  · Submission weight goal:    Plan:  Increase exercise  Next appointment is scheduled for  with Cari Castro, MSW, LCSW  _____________________________

## 2022-11-21 ENCOUNTER — OFFICE VISIT (OUTPATIENT)
Dept: FAMILY MEDICINE CLINIC | Facility: CLINIC | Age: 37
End: 2022-11-21

## 2022-11-21 VITALS
OXYGEN SATURATION: 98 % | SYSTOLIC BLOOD PRESSURE: 150 MMHG | BODY MASS INDEX: 50.6 KG/M2 | RESPIRATION RATE: 16 BRPM | HEART RATE: 112 BPM | DIASTOLIC BLOOD PRESSURE: 90 MMHG | WEIGHT: 268 LBS | HEIGHT: 61 IN | TEMPERATURE: 97 F

## 2022-11-21 DIAGNOSIS — E11.9 TYPE 2 DIABETES MELLITUS WITHOUT COMPLICATION, WITHOUT LONG-TERM CURRENT USE OF INSULIN (HCC): Primary | ICD-10-CM

## 2022-11-21 DIAGNOSIS — J06.9 VIRAL UPPER RESPIRATORY INFECTION: ICD-10-CM

## 2022-11-21 DIAGNOSIS — I10 PRIMARY HYPERTENSION: ICD-10-CM

## 2022-11-21 PROBLEM — H20.9 UVEITIS: Status: ACTIVE | Noted: 2022-11-21

## 2022-11-21 PROBLEM — Z01.818 PRE-OP EXAM: Status: RESOLVED | Noted: 2022-05-19 | Resolved: 2022-11-21

## 2022-11-21 LAB — SL AMB POCT HEMOGLOBIN AIC: 8 (ref ?–6.5)

## 2022-11-21 RX ORDER — SEMAGLUTIDE 1.34 MG/ML
INJECTION, SOLUTION SUBCUTANEOUS
Qty: 1.5 ML | Refills: 0 | Status: SHIPPED | OUTPATIENT
Start: 2022-11-21 | End: 2022-11-23 | Stop reason: ALTCHOICE

## 2022-11-21 RX ORDER — NEBULIZER AND COMPRESSOR
EACH MISCELLANEOUS DAILY
Qty: 1 KIT | Refills: 0 | Status: SHIPPED | OUTPATIENT
Start: 2022-11-21

## 2022-11-21 NOTE — PROGRESS NOTES
Name: Teresa Mckinney      : 1985      MRN: [id number]  Encounter Provider: DEVONTE Warren  Encounter Date: 2022   Encounter department: [facility]    Assessment & Plan     1  Type 2 diabetes mellitus without complication, without long-term current use of insulin [facility])  Assessment & Plan:    Lab Results   Component Value Date    HGBA1C 8 0 (A) 2022    HGBA1C 8 0 (A) 2022    HGBA1C 9 1 (H) 2022     No improvement in A1c with lifestyle changes  Pt brought blood sugar log, most readings in 170s +/-  Pt willing to start second agent at this time  Diabetic eye exams up to date  - Start semaglutide 0 25 mg weekly x4 weeks, then increase to 0 5 mg weekly  Prior auth form completed and faxed  - Continue metformin 500 mg BID   - Continue home blood sugar monitoring  Orders:  -     POCT hemoglobin A1c  -     Semaglutide,0 25 or 0 5MG/DOS, (Ozempic, 0 25 or 0 5 MG/DOSE,) 2 MG/1 5ML SOPN; Inject 0 25 mg under the skin once a week for 28 days, THEN 0 5 mg once a week for 28 days  -     Insulin Pen Needle 30G X 8 MM MISC; Use once a week    2  Primary hypertension  Assessment & Plan:  BP above goal in office today: 136/96 on repeat  Pt currently ill and reporting increased stressors   - Continue lisinopril 5 mg daily   - Home BP monitor with large cuff ordered  Monitor BP at home, contact office if continues >140/90    - Continue low-salt diet and daily physical activity    - Nurse visit for BP check in 2 weeks  Orders:  -     Blood Pressure Monitoring (Adult Blood Pressure Cuff Lg) KIT; Use in the morning    3  BMI 50 0-59 9, adult [facility])  Assessment & Plan:  BMI Counseling: Body mass index is 50 64 kg/m²  The BMI is above normal  Nutrition recommendations include decreasing portion sizes, encouraging healthy choices of fruits and vegetables, limiting drinks that contain sugar and moderation in carbohydrate intake   Exercise recommendations include moderate physical activity 150 minutes/week  Rationale for BMI follow-up plan is due to patient being overweight or obese  Pt currently working with Weight Management, pursuing weight loss surgery in the spring  4  Viral upper respiratory infection  Assessment & Plan:  Symptoms improving  Pt reports negative COVID test  Declines testing for influenza  - Continue home treatments for symptom management  BMI Counseling: Body mass index is 50 64 kg/m²  The BMI is above normal  Nutrition recommendations include decreasing portion sizes, encouraging healthy choices of fruits and vegetables, limiting drinks that contain sugar and moderation in carbohydrate intake  Exercise recommendations include moderate physical activity 150 minutes/week  Rationale for BMI follow-up plan is due to patient being overweight or obese  Pt currently working with Weight Management, pursuing weight loss surgery in the spring  Subjective     HPI     Hernesto Gonzalez presented to the office for diabetes and HTN follow up  Pt states she is watching her diet and is working with Weight Management to lose 14 lbs to proceed with weight loss surgery in March  She is replacing some meals with Ensure protein drinks  Vision has improved though patient reports it is still not clear enough for her to drive, which limits her ability to work and earn income  Pt reports URI symptoms starting Saturday of cough, runny nose and sore throat  No fever or body aches  Symptoms improving  Pt treating with elvia, lemon, and garlic  COVID negative at [facility]  Sleep study is scheduled for December  Review of Systems   Constitutional: Negative for fatigue, fever and unexpected weight change  HENT: Positive for congestion, rhinorrhea and sore throat  Negative for ear pain and trouble swallowing  Eyes: Negative for visual disturbance  Respiratory: Positive for cough  Negative for shortness of breath and wheezing      Cardiovascular: Negative for chest pain, palpitations and leg swelling  Gastrointestinal: Negative for abdominal pain, diarrhea, nausea and vomiting  Endocrine: Negative for polyuria  Genitourinary: Negative for difficulty urinating  Neurological: Negative for dizziness, weakness, numbness and headaches  All other systems reviewed and are negative  Past Medical History:   Diagnosis Date   • Diabetes mellitus ([facility] [place] [address] )    • Hypertension    • Obesity    • Seasonal allergies      Past Surgical History:   Procedure Laterality Date   • CATARACT EXTRACTION Left 06/08/2022   • CATARACT EXTRACTION Right 11/10/2021   • COLONOSCOPY  2013     Family History   Problem Relation Age of Onset   • Obesity Mother    • Sleep apnea Neg Hx      Social History     Socioeconomic History   • Marital status: Single     Spouse name: None   • Number of children: None   • Years of education: None   • Highest education level: None   Occupational History   • None   Tobacco Use   • Smoking status: Never   • Smokeless tobacco: Never   Vaping Use   • Vaping Use: Never used   Substance and Sexual Activity   • Alcohol use: Never   • Drug use: Never   • Sexual activity: Not Currently     Birth control/protection: Abstinence   Other Topics Concern   • None   Social History Narrative   • None     Social Determinants of Health     Financial Resource Strain: Low Risk    • Difficulty of Paying Living Expenses: Not hard at all   Food Insecurity: No Food Insecurity   • Worried About Running Out of Food in the Last Year: Never true   • Ran Out of Food in the Last Year: Never true   Transportation Needs: No Transportation Needs   • Lack of Transportation (Medical): No   • Lack of Transportation (Non-Medical):  No   Physical Activity: Not on file   Stress: Not on file   Social Connections: Not on file   Intimate Partner Violence: Not on file   Housing Stability: Not on file     Current Outpatient Medications on File Prior to Visit   Medication Sig   • lisinopril (ZESTRIL) 5 mg tablet Take 1 tablet (5 mg total) by mouth daily   • metFORMIN (GLUCOPHAGE) 500 mg tablet Take 1 tablet (500 mg total) by mouth 2 (two) times a day with meals   • Blood Glucose Monitoring Suppl (OneTouch Verio Flex System) w/Device KIT TEST EVERY MORNING   • cetirizine (ZyrTEC) 10 mg tablet Take 1 tablet (10 mg total) by mouth in the morning  • cholecalciferol (VITAMIN D3) 400 units tablet Take 2,000 Units by mouth daily   • famotidine (PEPCID) 20 mg tablet Take 1 tablet (20 mg total) by mouth 2 (two) times a day   • ketorolac (ACULAR) 0 5 % ophthalmic solution INSTILL 1 DROP IN LEFT EYE FOUR TIMES DAILY   • Lancets (freestyle) lancets Use in the morning Use as instructed   • loratadine (CLARITIN) 10 mg tablet Take 10 mg by mouth daily   • Multiple Vitamins-Minerals (multivitamin with minerals) tablet Take 1 tablet by mouth daily   • OneTouch Verio test strip Use 1 each in the morning Use as instructed   • prednisoLONE acetate (PRED FORTE) 1 % ophthalmic suspension Administer 1 drop to both eyes in the morning     No Known Allergies    There is no immunization history on file for this patient  Objective     /90 (BP Location: Left arm, Patient Position: Sitting, Cuff Size: Large)   Pulse (!) 112   Temp (!) 97 °F (36 1 °C) (Temporal)   Resp 16   Ht 5' 1" (1 549 m)   Wt 122 kg (268 lb)   LMP 11/03/2022 (Exact Date)   SpO2 98%   BMI 50 64 kg/m²     Physical Exam  Vitals reviewed  Constitutional:       General: She is not in acute distress  Appearance: She is morbidly obese  She is not ill-appearing or diaphoretic  HENT:      Head: Normocephalic and atraumatic  Salivary Glands: Right salivary gland is not diffusely enlarged or tender  Left salivary gland is not diffusely enlarged or tender        Right Ear: Tympanic membrane, ear canal and external ear normal       Left Ear: Tympanic membrane, ear canal and external ear normal       Nose: Nose normal       Mouth/Throat: Mouth: Mucous membranes are moist       Pharynx: Uvula midline  Posterior oropharyngeal erythema present  No oropharyngeal exudate  Tonsils: No tonsillar exudate  Eyes:      General: Lids are normal       Conjunctiva/sclera: Conjunctivae normal       Pupils: Pupils are equal, round, and reactive to light  Neck:      Thyroid: No thyromegaly or thyroid tenderness  Cardiovascular:      Rate and Rhythm: Normal rate and regular rhythm  Heart sounds: Normal heart sounds  No murmur heard  Pulmonary:      Effort: Pulmonary effort is normal  No tachypnea  Breath sounds: Normal breath sounds  No decreased breath sounds, wheezing or rales  Lymphadenopathy:      Cervical: No cervical adenopathy  Skin:     General: Skin is warm and dry  Neurological:      Mental Status: She is alert and oriented to person, place, and time     Psychiatric:         Mood and Affect: Mood and affect normal        DEVONTE Mckeon

## 2022-11-22 NOTE — ASSESSMENT & PLAN NOTE
Symptoms improving  Pt reports negative COVID test  Declines testing for influenza  - Continue home treatments for symptom management

## 2022-11-22 NOTE — ASSESSMENT & PLAN NOTE
BMI Counseling: Body mass index is 50 64 kg/m²  The BMI is above normal  Nutrition recommendations include decreasing portion sizes, encouraging healthy choices of fruits and vegetables, limiting drinks that contain sugar and moderation in carbohydrate intake  Exercise recommendations include moderate physical activity 150 minutes/week  Rationale for BMI follow-up plan is due to patient being overweight or obese  Pt currently working with Weight Management, pursuing weight loss surgery in the spring

## 2022-11-22 NOTE — ASSESSMENT & PLAN NOTE
BP above goal in office today: 136/96 on repeat  Pt currently ill and reporting increased stressors   - Continue lisinopril 5 mg daily   - Home BP monitor with large cuff ordered  Monitor BP at home, contact office if continues >140/90    - Continue low-salt diet and daily physical activity    - Nurse visit for BP check in 2 weeks

## 2022-11-22 NOTE — ASSESSMENT & PLAN NOTE
Lab Results   Component Value Date    HGBA1C 8 0 (A) 11/21/2022    HGBA1C 8 0 (A) 08/22/2022    HGBA1C 9 1 (H) 05/19/2022     No improvement in A1c with lifestyle changes  Pt brought blood sugar log, most readings in 170s +/-  Pt willing to start second agent at this time  Diabetic eye exams up to date  - Start semaglutide 0 25 mg weekly x4 weeks, then increase to 0 5 mg weekly  Prior auth form completed and faxed  - Continue metformin 500 mg BID   - Continue home blood sugar monitoring

## 2022-11-23 ENCOUNTER — TELEPHONE (OUTPATIENT)
Dept: FAMILY MEDICINE CLINIC | Facility: CLINIC | Age: 37
End: 2022-11-23

## 2022-11-23 DIAGNOSIS — E11.9 TYPE 2 DIABETES MELLITUS WITHOUT COMPLICATION, WITHOUT LONG-TERM CURRENT USE OF INSULIN (HCC): Primary | ICD-10-CM

## 2022-12-01 ENCOUNTER — PREP FOR PROCEDURE (OUTPATIENT)
Dept: BARIATRICS | Facility: CLINIC | Age: 37
End: 2022-12-01

## 2022-12-01 DIAGNOSIS — Z98.84 BARIATRIC SURGERY STATUS: Primary | ICD-10-CM

## 2022-12-05 ENCOUNTER — CLINICAL SUPPORT (OUTPATIENT)
Dept: FAMILY MEDICINE CLINIC | Facility: CLINIC | Age: 37
End: 2022-12-05

## 2022-12-05 VITALS — DIASTOLIC BLOOD PRESSURE: 90 MMHG | SYSTOLIC BLOOD PRESSURE: 138 MMHG

## 2022-12-05 DIAGNOSIS — I10 PRIMARY HYPERTENSION: Primary | ICD-10-CM

## 2022-12-05 DIAGNOSIS — E11.9 TYPE 2 DIABETES MELLITUS WITHOUT COMPLICATION, WITHOUT LONG-TERM CURRENT USE OF INSULIN (HCC): ICD-10-CM

## 2022-12-05 RX ORDER — LISINOPRIL 10 MG/1
10 TABLET ORAL DAILY
Qty: 30 TABLET | Refills: 2 | Status: SHIPPED | OUTPATIENT
Start: 2022-12-05

## 2022-12-05 NOTE — ASSESSMENT & PLAN NOTE
BP remains above goal at nurse visit today: 138/90   - Increase lisinopril to 10 mg daily  - Continue low-salt diet and daily physical activity    - Recheck in 2 weeks

## 2022-12-05 NOTE — ASSESSMENT & PLAN NOTE
Lab Results   Component Value Date    HGBA1C 8 0 (A) 11/21/2022     Ozempic denied by insurance  Trulicity preferred by insurance, ordered on 11/23/22  Pt has not yet received  - Pt to contact pharmacy to determine if Trulicity has been filled  Contact office if any problem obtaining the medication or if assistance needed with administration

## 2022-12-08 ENCOUNTER — VBI (OUTPATIENT)
Dept: ADMINISTRATIVE | Facility: OTHER | Age: 37
End: 2022-12-08

## 2022-12-08 ENCOUNTER — OFFICE VISIT (OUTPATIENT)
Dept: BARIATRICS | Facility: CLINIC | Age: 37
End: 2022-12-08

## 2022-12-08 VITALS — WEIGHT: 264.5 LBS | HEIGHT: 61 IN | BODY MASS INDEX: 49.94 KG/M2

## 2022-12-08 DIAGNOSIS — E66.9 OBESITY, UNSPECIFIED: Primary | ICD-10-CM

## 2022-12-08 NOTE — PROGRESS NOTES
Bariatric Follow Up Nutrition Note    Preop 4/6 wt checks  Preop Program    Type of surgery  Preop  Surgery Date: TBD    Surgeon: Dr Quinton Aceves  44 y o   female  Height 5' 1" (1 549 m), weight 120 kg (264 lb 8 oz), not currently breastfeeding  [facility] [place] [id number] to lose 1-2 lbs/week (sedentary)                                 Estimated protein needs 60-65g                                 Estimated fluid needs 64 oz        Review of History and Medications   Past Medical History:   Diagnosis Date   • Diabetes mellitus ([facility] [place] [address] )    • Hypertension    • Obesity    • Seasonal allergies      Past Surgical History:   Procedure Laterality Date   • CATARACT EXTRACTION Left 06/08/2022   • CATARACT EXTRACTION Right 11/10/2021   • COLONOSCOPY  2013     Social History     Socioeconomic History   • Marital status: Single     Spouse name: Not on file   • Number of children: Not on file   • Years of education: Not on file   • Highest education level: Not on file   Occupational History   • Not on file   Tobacco Use   • Smoking status: Never   • Smokeless tobacco: Never   Vaping Use   • Vaping Use: Never used   Substance and Sexual Activity   • Alcohol use: Never   • Drug use: Never   • Sexual activity: Not Currently     Birth control/protection: Abstinence   Other Topics Concern   • Not on file   Social History Narrative   • Not on file     Social Determinants of Health     Financial Resource Strain: Low Risk    • Difficulty of Paying Living Expenses: Not hard at all   Food Insecurity: No Food Insecurity   • Worried About Running Out of Food in the Last Year: Never true   • Ran Out of Food in the Last Year: Never true   Transportation Needs: No Transportation Needs   • Lack of Transportation (Medical): No   • Lack of Transportation (Non-Medical):  No   Physical Activity: Not on file   Stress: Not on file   Social Connections: Not on file   Intimate Partner Violence: Not on file   Housing Stability: Not on file       Current Outpatient Medications:   •  metFORMIN (GLUCOPHAGE) 500 mg tablet, Take 1 tablet (500 mg total) by mouth 2 (two) times a day with meals, Disp: 180 tablet, Rfl: 1  •  Blood Glucose Monitoring Suppl (OneTouch Verio Flex System) w/Device KIT, TEST EVERY MORNING, Disp: , Rfl:   •  Blood Pressure Monitoring (Adult Blood Pressure Cuff Lg) KIT, Use in the morning, Disp: 1 kit, Rfl: 0  •  cetirizine (ZyrTEC) 10 mg tablet, Take 1 tablet (10 mg total) by mouth in the morning , Disp: 30 tablet, Rfl: 1  •  cholecalciferol (VITAMIN D3) 400 units tablet, Take 2,000 Units by mouth daily, Disp: , Rfl:   •  Dulaglutide 0 75 MG/0 5ML SOPN, Inject 0 5 mL (0 75 mg total) under the skin once a week, Disp: 2 mL, Rfl: 0  •  famotidine (PEPCID) 20 mg tablet, Take 1 tablet (20 mg total) by mouth 2 (two) times a day, Disp: 60 tablet, Rfl: 1  •  Insulin Pen Needle 30G X 8 MM MISC, Use once a week, Disp: 100 each, Rfl: 0  •  ketorolac (ACULAR) 0 5 % ophthalmic solution, INSTILL 1 DROP IN LEFT EYE FOUR TIMES DAILY, Disp: , Rfl:   •  Lancets (freestyle) lancets, Use in the morning Use as instructed, Disp: 100 each, Rfl: 2  •  lisinopril (ZESTRIL) 10 mg tablet, Take 1 tablet (10 mg total) by mouth daily, Disp: 30 tablet, Rfl: 2  •  loratadine (CLARITIN) 10 mg tablet, Take 10 mg by mouth daily, Disp: , Rfl:   •  Multiple Vitamins-Minerals (multivitamin with minerals) tablet, Take 1 tablet by mouth daily, Disp: , Rfl:   •  OneTouch Verio test strip, Use 1 each in the morning Use as instructed, Disp: 100 strip, Rfl: 2  •  prednisoLONE acetate (PRED FORTE) 1 % ophthalmic suspension, Administer 1 drop to both eyes in the morning, Disp: , Rfl:     Food Intake and Lifestyle Assessment   Food Intake Assessment completed via usual diet recall  Breakfast: skips  Snack:  0   Lunch: protein, rice, salad  Snack: 0  Dinner: 5pm salad with chicken or shrimp  Snack: fruit  Beverage intake: water and decaf coffee/tea  Diet texture/stage: regular  Protein supplement: 0  Estimated protein intake per day: >70g  Estimated fluid intake per day: 16 oz-just sipping 1 bottle of water/day  Meals eaten away from home: seldom  Typical meal pattern: 2-3 meals per day and 1-2 snacks per day  Eating Behaviors: Appropriate portion sizes and Does not drink with meals, eating rice only once daily, chewing food well    Food allergies or intolerances: none  Cultural or Taoism considerations:     Physical Assessment      Physical Activity  Types of exercise: Walking 2 hours 2 times/week  Current physical limitations: pain in legs better    Psychosocial Assessment   Support systems: Godmother  Socioeconomic factors: Lives with cousin, not working currently    Nutrition Diagnosis  Diagnosis: Overweight / Obesity (NC-3 3)  Related to: Excessive energy intake and Altered GI function  As Evidenced by: BMI >25     Interventions and Teaching   Patient educated on post-op nutrition guidelines  Patient educated and handouts provided    Surgical changes to stomach / GI  Capacity of post-surgery stomach  Diet progression  Adequate hydration  Sugar and fat restriction to decrease "dumping syndrome"  Fat restriction to decrease steatorrhea  Expected weight loss  Weight loss plateaus/ possibility of weight regain  Exercise  Suggestions for pre-op diet  Nutrition considerations after surgery  Protein supplements  Meal planning and preparation  Appropriate carbohydrate, protein, and fat intake, and food/fluid choices to maximize safe weight loss, nutrient intake, and tolerance   Dietary and lifestyle changes  Possible problems with poor eating habits  Intuitive eating  Techniques for self monitoring and keeping daily food journal  Potential for food intolerance after surgery, and ways to deal with them including: lactose intolerance, nausea, reflux, vomiting, diarrhea, food intolerance, appetite changes, gas  Vitamin / Mineral supplementation of Multivitamin with minerals and Vitamin D  Post-operative pregnancy guidelines    Education provided to: patient    Barriers to learning: No barriers identified    Readiness to change: preparation    Comprehension: verbalizes understanding     Expected Compliance: good  Workflow:  • Psych and/or D+A Clearance: N/A  • PCP Letter: done  • Support Group: needs  • Surgeon Appt done  • EGD 12/21/2022  • Cardiac Risk Assessment needs  • Sleep Studies 12/27  • Blood work needs  • Nicotine test N/A  • 6 Month Pre-Operative Program: 4/6 wt checks  • Weight Loss needs to lose 2 lbs      Goals  Food journal, Exercise 30 minutes 5 times per week, Complete lession plans 1-6 and Eat 3 meals per day     Time Spent:   30 Minutes

## 2022-12-19 ENCOUNTER — APPOINTMENT (OUTPATIENT)
Dept: LAB | Facility: HOSPITAL | Age: 37
End: 2022-12-19

## 2022-12-19 ENCOUNTER — CLINICAL SUPPORT (OUTPATIENT)
Dept: FAMILY MEDICINE CLINIC | Facility: CLINIC | Age: 37
End: 2022-12-19

## 2022-12-19 VITALS — SYSTOLIC BLOOD PRESSURE: 130 MMHG | HEART RATE: 93 BPM | DIASTOLIC BLOOD PRESSURE: 88 MMHG

## 2022-12-19 DIAGNOSIS — I10 PRIMARY HYPERTENSION: Primary | ICD-10-CM

## 2022-12-20 RX ORDER — SODIUM CHLORIDE 9 MG/ML
125 INJECTION, SOLUTION INTRAVENOUS CONTINUOUS
Status: CANCELLED | OUTPATIENT
Start: 2022-12-20

## 2022-12-21 ENCOUNTER — HOSPITAL ENCOUNTER (OUTPATIENT)
Dept: GASTROENTEROLOGY | Facility: HOSPITAL | Age: 37
Setting detail: OUTPATIENT SURGERY
Discharge: HOME | End: 2022-12-21
Attending: SURGERY

## 2022-12-21 ENCOUNTER — ANESTHESIA EVENT (OUTPATIENT)
Dept: GASTROENTEROLOGY | Facility: HOSPITAL | Age: 37
End: 2022-12-21

## 2022-12-21 ENCOUNTER — ANESTHESIA (OUTPATIENT)
Dept: GASTROENTEROLOGY | Facility: HOSPITAL | Age: 37
End: 2022-12-21

## 2022-12-21 VITALS
BODY MASS INDEX: 49.84 KG/M2 | OXYGEN SATURATION: 100 % | RESPIRATION RATE: 17 BRPM | HEART RATE: 82 BPM | HEIGHT: 61 IN | TEMPERATURE: 97 F | SYSTOLIC BLOOD PRESSURE: 131 MMHG | DIASTOLIC BLOOD PRESSURE: 81 MMHG | WEIGHT: 264 LBS

## 2022-12-21 DIAGNOSIS — Z98.84 BARIATRIC SURGERY STATUS: ICD-10-CM

## 2022-12-21 LAB
EXT PREGNANCY TEST URINE: NEGATIVE
EXT. CONTROL: ABNORMAL
GLUCOSE SERPL-MCNC: 170 MG/DL (ref 65–140)

## 2022-12-21 RX ORDER — PROPOFOL 10 MG/ML
INJECTION, EMULSION INTRAVENOUS AS NEEDED
Status: DISCONTINUED | OUTPATIENT
Start: 2022-12-21 | End: 2022-12-21

## 2022-12-21 RX ORDER — LIDOCAINE HYDROCHLORIDE 20 MG/ML
INJECTION, SOLUTION EPIDURAL; INFILTRATION; INTRACAUDAL; PERINEURAL AS NEEDED
Status: DISCONTINUED | OUTPATIENT
Start: 2022-12-21 | End: 2022-12-21

## 2022-12-21 RX ORDER — SODIUM CHLORIDE 9 MG/ML
125 INJECTION, SOLUTION INTRAVENOUS CONTINUOUS
Status: DISCONTINUED | OUTPATIENT
Start: 2022-12-21 | End: 2022-12-25 | Stop reason: HOSPADM

## 2022-12-21 RX ADMIN — PROPOFOL 200 MG: 10 INJECTION, EMULSION INTRAVENOUS at 07:54

## 2022-12-21 RX ADMIN — LIDOCAINE HYDROCHLORIDE 100 MG: 20 INJECTION, SOLUTION EPIDURAL; INFILTRATION; INTRACAUDAL at 07:54

## 2022-12-21 RX ADMIN — SODIUM CHLORIDE: 0.9 INJECTION, SOLUTION INTRAVENOUS at 07:30

## 2022-12-21 RX ADMIN — SODIUM CHLORIDE 125 ML/HR: 0.9 INJECTION, SOLUTION INTRAVENOUS at 07:32

## 2022-12-21 NOTE — ANESTHESIA POSTPROCEDURE EVALUATION
Post-Op Assessment Note    CV Status:  Stable    Pain management: adequate     Mental Status:  Alert and awake   Hydration Status:  Euvolemic   PONV Controlled:  Controlled   Airway Patency:  Patent      Post Op Vitals Reviewed: Yes      Staff: Anesthesiologist         No notable events documented      /70 (12/21/22 0801)    Temp     Pulse 94 (12/21/22 0801)   Resp 17 (12/21/22 0801)    SpO2 98 % (12/21/22 0801)

## 2022-12-21 NOTE — ANESTHESIA PREPROCEDURE EVALUATION
Procedure:  EGD    Relevant Problems   CARDIO   (+) Hypertension      ENDO   (+) Type 2 diabetes mellitus without complication, without long-term current use of insulin (HCC)      GI/HEPATIC   (+) Gastroesophageal reflux disease      PULMONARY   (+) Viral upper respiratory infection      Other   (+) BMI 50 0-59 9, adult (HCC)   (+) Risk factors for obstructive sleep apnea        Physical Exam    Airway    Mallampati score: II  TM Distance: <3 FB  Neck ROM: full     Dental   No notable dental hx     Cardiovascular  Rhythm: regular, Rate: normal,     Pulmonary  Breath sounds clear to auscultation,     Other Findings        Anesthesia Plan  ASA Score- 3     Anesthesia Type- general with ASA Monitors  Additional Monitors:   Airway Plan:           Plan Factors-Exercise tolerance (METS): >4 METS  Chart reviewed  Patient is not a current smoker  Induction- intravenous  Postoperative Plan-     Informed Consent- Anesthetic plan and risks discussed with patient

## 2022-12-21 NOTE — H&P
H&P EXAM - Outpatient Endoscopy  [facility] GI LAB INTRA   Radha Felton 44 y o  female MRN: [id number]  Unit/Bed#:  Encounter: [id number]        Impression: Morbid obesity    Plan:Upper endoscopy and a biopsy to rule out H  Pylori    Chief Complaint: Morbid obesity and preoperative endoscopy    Physical Exam: Normal not in acute distress   Chest: Clear to auscultation   Heart: Normal S1 and S2

## 2022-12-27 ENCOUNTER — HOSPITAL ENCOUNTER (OUTPATIENT)
Dept: SLEEP CENTER | Facility: CLINIC | Age: 37
Discharge: HOME | End: 2022-12-27

## 2022-12-27 DIAGNOSIS — G47.33 OSA (OBSTRUCTIVE SLEEP APNEA): ICD-10-CM

## 2022-12-27 DIAGNOSIS — Z91.89 RISK FACTORS FOR OBSTRUCTIVE SLEEP APNEA: ICD-10-CM

## 2022-12-28 NOTE — PROGRESS NOTES
Sleep Study Documentation    Pre-Sleep Study       Sleep testing procedure explained to patient:YES    Patient napped prior to study:YES- less than 30 minutes  Napped after 2PM: no    Caffeine:Dayshift worker after 12PM   Caffeine use:YES- tea  6 ounces, hot cocoa  6 ounces and ice tea  12 ounces    Alcohol:Dayshift workers after 5PM: Alcohol use:NO    Typical day for patient:NO       Study Documentation    Sleep Study Indications: split night study at an AHI > 5 due to snoring and excessive daytime sleepiness    Sleep Study: Split Optimal PAP pressure: 10cm  Leak:None  Snore:Eliminated  REM Obtained:yes  Supplemental O2: no    Minimum SaO2 89  Baseline SaO2 93  PAP mask choice ResMed AirFit N20  PAP mask type:nasal  PAP pressure at which snoring was eliminated 5cm  Mode of Therapy:CPAP  ETCO2:No  CPAP changed to BiPAP:No      EKG abnormalities: no     EEG abnormalities: no    Sleep Study Recorded < 2 hours: N/A    Sleep Study Recorded > 2 hours but incomplete study: N/A    Sleep Study Recorded 6 hours but no sleep obtained: NO       Post-Sleep Study    Medication used at bedtime or during sleep study:NO    Patient reports time it took to fall asleep:30 to 60 minutes    Patient reports waking up during study:Denied    Patient reports sleeping more than 8 hours with dreaming  Patient reports sleep during study:typical    Patient rated sleepiness: Somewhat sleepy or tired    PAP treatment:yes: Post PAP treatment patient reports feeling unsure if a change is noted and  would not wear PAP mask at home

## 2022-12-29 DIAGNOSIS — G47.33 OSA (OBSTRUCTIVE SLEEP APNEA): Primary | ICD-10-CM

## 2023-01-02 DIAGNOSIS — E11.9 TYPE 2 DIABETES MELLITUS WITHOUT COMPLICATION, WITHOUT LONG-TERM CURRENT USE OF INSULIN (HCC): ICD-10-CM

## 2023-01-03 RX ORDER — DULAGLUTIDE 0.75 MG/.5ML
INJECTION, SOLUTION SUBCUTANEOUS
Qty: 2 ML | Refills: 0 | Status: SHIPPED | OUTPATIENT
Start: 2023-01-03

## 2023-01-04 ENCOUNTER — TELEPHONE (OUTPATIENT)
Dept: SLEEP CENTER | Facility: CLINIC | Age: 38
End: 2023-01-04

## 2023-01-04 NOTE — TELEPHONE ENCOUNTER
Called patient and advised of sleep study results  Scheduled DME set up 1/18/23 in [place]  Rx for CPAP and clinical information sent to [address] [place] [address] via [place]  Scheduled compliance follow up 6/12/23    Added to wait list

## 2023-01-04 NOTE — TELEPHONE ENCOUNTER
----- Message from Rigo Lipscomb MD sent at 12/30/2022 10:18 AM EST -----  Test shows very significant MARIA LUISA , auto-PAP was ordered by Dr Faustino Horta, she needs DME setup and then followup with me, thanks

## 2023-01-05 ENCOUNTER — OFFICE VISIT (OUTPATIENT)
Dept: BARIATRICS | Facility: CLINIC | Age: 38
End: 2023-01-05

## 2023-01-05 ENCOUNTER — TELEPHONE (OUTPATIENT)
Dept: FAMILY MEDICINE CLINIC | Facility: CLINIC | Age: 38
End: 2023-01-05

## 2023-01-05 DIAGNOSIS — E66.01 OBESITY, CLASS III, BMI 40-49.9 (MORBID OBESITY) (HCC): Primary | ICD-10-CM

## 2023-01-05 LAB
DME PARACHUTE DELIVERY DATE REQUESTED: NORMAL
DME PARACHUTE DELIVERY NOTE: NORMAL
DME PARACHUTE ITEM DESCRIPTION: NORMAL
DME PARACHUTE ORDER STATUS: NORMAL
DME PARACHUTE SUPPLIER NAME: NORMAL
DME PARACHUTE SUPPLIER PHONE: NORMAL

## 2023-01-05 NOTE — PROGRESS NOTES
Robinson Qureshi  is a 44 y o    female    :  1985    Patient presented for Pre-surgical weight check  WT [address] number 5 of 6  Started program 22  Starting weight: 269 6   Submission Target: 65   Today's weight:      263 1    Discussed preparations for surgery and lifestyle change  Has been challegning herself with more walking, and walked back from the store  Really proud of herself  Her eyes are better right now and she is postponing a second surgery until after WLS  Eating behaviors - has been changing choices to more seafood and vegetables  This was her goal   Activity - coached herself to strengthen her legs through walking  Wants to repeat that walk  This was one of her goals from last time  She is interested in using the gym  Mental health and wellness - encourages herself and is motivated  Progress toward program requirements  Workflow:  · Psych and/or D+A Clearance: n/a  · Surgeon Appt : done  · EGD : done  · Support Group: needs  · PCP Letter: done  · Cardiac Risk Assessment:   · Sleep Studies: done and needs CPAP  appt  · Bloodwork: needs  · Nicotine test: n/a  · Submission weight goal:    Plan:  Increase walks to 3/week  Next appointment is scheduled for  with CECILE Rod, LCSW  _____________________________

## 2023-01-05 NOTE — TELEPHONE ENCOUNTER
Pt needs refill    OneTouch Verio test strip    famotidine (PEPCID) 20 mg tablet    Insulin Pen Needle 30G X 8 MM MISC

## 2023-01-06 DIAGNOSIS — K21.9 GASTROESOPHAGEAL REFLUX DISEASE, UNSPECIFIED WHETHER ESOPHAGITIS PRESENT: ICD-10-CM

## 2023-01-06 DIAGNOSIS — E11.9 TYPE 2 DIABETES MELLITUS WITHOUT COMPLICATION, WITHOUT LONG-TERM CURRENT USE OF INSULIN (HCC): ICD-10-CM

## 2023-01-08 DIAGNOSIS — E11.9 TYPE 2 DIABETES MELLITUS WITHOUT COMPLICATION, WITHOUT LONG-TERM CURRENT USE OF INSULIN (HCC): Primary | ICD-10-CM

## 2023-01-09 ENCOUNTER — TELEPHONE (OUTPATIENT)
Dept: FAMILY MEDICINE CLINIC | Facility: CLINIC | Age: 38
End: 2023-01-09

## 2023-01-09 DIAGNOSIS — E11.9 TYPE 2 DIABETES MELLITUS WITHOUT COMPLICATION, WITHOUT LONG-TERM CURRENT USE OF INSULIN (HCC): Primary | ICD-10-CM

## 2023-01-09 RX ORDER — LANCETS 33 GAUGE
EACH MISCELLANEOUS
Qty: 200 EACH | Refills: 3 | Status: SHIPPED | OUTPATIENT
Start: 2023-01-09

## 2023-01-09 RX ORDER — BLOOD SUGAR DIAGNOSTIC
1 STRIP MISCELLANEOUS DAILY
Qty: 100 STRIP | Refills: 2 | Status: SHIPPED | OUTPATIENT
Start: 2023-01-09

## 2023-01-09 RX ORDER — FAMOTIDINE 20 MG/1
20 TABLET, FILM COATED ORAL 2 TIMES DAILY
Qty: 60 TABLET | Refills: 1 | Status: SHIPPED | OUTPATIENT
Start: 2023-01-09

## 2023-01-09 NOTE — TELEPHONE ENCOUNTER
Pt lvm stating if someone can call back, the PCP sent a prescription wrong, I called back the pt and pt stated if the PCP can send the lancet for One touch Verio

## 2023-01-12 RX ORDER — BLOOD-GLUCOSE METER
EACH MISCELLANEOUS
Qty: 1 KIT | Refills: 0 | Status: SHIPPED | OUTPATIENT
Start: 2023-01-12

## 2023-01-18 ENCOUNTER — OFFICE VISIT (OUTPATIENT)
Dept: DENTISTRY | Facility: CLINIC | Age: 38
End: 2023-01-18

## 2023-01-18 VITALS — SYSTOLIC BLOOD PRESSURE: 153 MMHG | DIASTOLIC BLOOD PRESSURE: 97 MMHG | TEMPERATURE: 97.5 F | HEART RATE: 111 BPM

## 2023-01-18 DIAGNOSIS — Z01.20 ENCOUNTER FOR DENTAL EXAMINATION: Primary | ICD-10-CM

## 2023-01-18 LAB
DME PARACHUTE DELIVERY DATE EXPECTED: NORMAL
DME PARACHUTE DELIVERY DATE REQUESTED: NORMAL
DME PARACHUTE DELIVERY NOTE: NORMAL
DME PARACHUTE ITEM DESCRIPTION: NORMAL
DME PARACHUTE ORDER STATUS: NORMAL
DME PARACHUTE SUPPLIER NAME: NORMAL
DME PARACHUTE SUPPLIER PHONE: NORMAL

## 2023-01-18 NOTE — PROGRESS NOTES
Dental procedures in this visit   •  - ASSESSMENT OF A PATIENT (Completed)     Service provider: Blanca Bagley 195     Billing provider: Kimberlee Bacon DMD     Subjective   Patient ID: Dodie Cockayne is a 44 y o  female  Chief Complaint   Patient presents with   • Periodontal Screening     Post SRP eval     ASA II  Pain - O  Reviewed M/DH    Today:   Assessment - Post SRP eval w/ FMP    Probe readings came down about 1 mm in a lot of areas  Some areas were about 1 mm higher  Light BUP in molar areas  ---Reviewed brushing and flossing technique, recommended listerine at night  Stressed longer gumline brush 2 X/day  No exam today    Referral: none    NV1:  Rest - 31 - OB - 60 min  NV2:  perio maintenance - 3 months from 10/11/22

## 2023-01-20 PROBLEM — J06.9 VIRAL UPPER RESPIRATORY INFECTION: Status: RESOLVED | Noted: 2022-11-21 | Resolved: 2023-01-20

## 2023-01-31 NOTE — PROGRESS NOTES
Cardiology Consultation     Stone Agustin  [id number]  1985  [facility] CARDIOLOGY [place]  [address] [place] [id number]      1  Preop cardiovascular exam  Ambulatory Referral to Cardiology    POCT ECG      2  BMI 50 0-59 9, adult [facility])  Ambulatory Referral to Cardiology      3  Primary hypertension  Ambulatory Referral to Cardiology      4  Type 2 diabetes mellitus without complication, without long-term current use of insulin ([address] )  Ambulatory Referral to Cardiology          Discussion/Summary:  Preop cardiovascular exam  -Patient currently being evaluated for a single anastomosis bariatric surgery with Dr Clinton Trinidad  -EKG today shows sinus rhythm low voltage QRS likely due to body habitus, no acute ischemic changes  -No murmur or other concerning findings on physical exam  - Able to complete >4 METS of activity without any chest pain or other cardiac symptoms  - She is stable from a cardiac standpoint to undergo bariatric surgery does not require any further cardiac testing at this time  Hypertension  - Continue with lisinopril 10 mg daily  -BP mildly elevated today, patient reports her lisinopril was recently increased from 5 mg to 10 mg by her PCP  - will continue to monitor for now  Morbid obesity  - BMI of 50  MARIA LUISA  -Diagnosed with sleep study on 12/28/2022  - She was prescribed CPAP, but has been unable to pick it up due to transportation issues  Type 2 diabetes  - Hemoglobin A1c 7 9 on 12/19/2022  GERD      History of Present Illness:  Stone Agustin is a 44y o  year old female with a past medical history of type 2 diabetes, GERD, morbid obesity, hypertension, and MARIA LUISA  She reports feeling well today  Her main complaint is joint pain from her obesity  She has been exercising regularly for 6 months now in preparation for her bariatric surgery  She reports walking 1 to 2 hours a day up to 5 times a week depending on the weather for exercise    He now some dyspnea on exertion especially when going up hills, but denies any chest pain or other cardiac symptoms  She denies any prior cardiac history  No significant family history of heart disease as well  Denies any tobacco, alcohol or illicit drug use  Patient Active Problem List   Diagnosis   • Type 2 diabetes mellitus without complication, without long-term current use of insulin ([facility])   • Allergic rhinitis   • Gastroesophageal reflux disease   • BMI 50 0-59 9, adult ([place] [address] )   • Hypertension   • Vaginal discharge   • Abnormal ECG   • Risk factors for obstructive sleep apnea   • Periodontitis   • Uveitis   • MARIA LUISA (obstructive sleep apnea)     Past Medical History:   Diagnosis Date   • Diabetes mellitus ([place] [address] )    • Hypertension    • Obesity    • Seasonal allergies      Social History     Socioeconomic History   • Marital status: Single     Spouse name: Not on file   • Number of children: Not on file   • Years of education: Not on file   • Highest education level: Not on file   Occupational History   • Not on file   Tobacco Use   • Smoking status: Never   • Smokeless tobacco: Never   Vaping Use   • Vaping Use: Never used   Substance and Sexual Activity   • Alcohol use: Never   • Drug use: Never   • Sexual activity: Not Currently     Birth control/protection: Abstinence   Other Topics Concern   • Not on file   Social History Narrative   • Not on file     Social Determinants of Health     Financial Resource Strain: Low Risk    • Difficulty of Paying Living Expenses: Not hard at all   Food Insecurity: No Food Insecurity   • Worried About Running Out of Food in the Last Year: Never true   • Ran Out of Food in the Last Year: Never true   Transportation Needs: No Transportation Needs   • Lack of Transportation (Medical): No   • Lack of Transportation (Non-Medical):  No   Physical Activity: Not on file   Stress: Not on file   Social Connections: Not on file   Intimate Partner Violence: Not on file   Housing Stability: Not on file      Family History   Problem Relation Age of Onset   • Obesity Mother    • Sleep apnea Neg Hx      Past Surgical History:   Procedure Laterality Date   • CATARACT EXTRACTION Left 06/08/2022   • CATARACT EXTRACTION Right 11/10/2021   • COLONOSCOPY  2013       Current Outpatient Medications:   •  Blood Glucose Monitoring Suppl (OneTouch Verio Flex System) w/Device KIT, TEST EVERY MORNING, Disp: 1 kit, Rfl: 0  •  Blood Pressure Monitoring (Adult Blood Pressure Cuff Lg) KIT, Use in the morning, Disp: 1 kit, Rfl: 0  •  cetirizine (ZyrTEC) 10 mg tablet, Take 1 tablet (10 mg total) by mouth in the morning , Disp: 30 tablet, Rfl: 1  •  cholecalciferol (VITAMIN D3) 400 units tablet, Take 2,000 Units by mouth daily, Disp: , Rfl:   •  famotidine (PEPCID) 20 mg tablet, Take 1 tablet (20 mg total) by mouth 2 (two) times a day, Disp: 60 tablet, Rfl: 1  •  Insulin Pen Needle 30G X 8 MM MISC, Use once a week, Disp: 100 each, Rfl: 0  •  ketorolac (ACULAR) 0 5 % ophthalmic solution, INSTILL 1 DROP IN LEFT EYE FOUR TIMES DAILY, Disp: , Rfl:   •  lisinopril (ZESTRIL) 10 mg tablet, Take 1 tablet (10 mg total) by mouth daily, Disp: 30 tablet, Rfl: 2  •  metFORMIN (GLUCOPHAGE) 500 mg tablet, Take 1 tablet (500 mg total) by mouth 2 (two) times a day with meals, Disp: 180 tablet, Rfl: 1  •  Multiple Vitamins-Minerals (multivitamin with minerals) tablet, Take 1 tablet by mouth daily, Disp: , Rfl:   •  OneTouch Delica Lancets 31J MISC, Check blood sugars twice daily  Please substitute with appropriate alternative as covered by patient's insurance   Dx: E11 65, Disp: 200 each, Rfl: 3  •  OneTouch Verio test strip, Use 1 each in the morning Use as instructed, Disp: 100 strip, Rfl: 2  •  prednisoLONE acetate (PRED FORTE) 1 % ophthalmic suspension, Administer 1 drop to both eyes in the morning, Disp: , Rfl:   •  Trulicity 6 81 LD/8 6UP SOPN, ADMINISTER 0 75 MG UNDER THE SKIN 1 TIME A WEEK, Disp: 2 mL, Rfl: 0  •  loratadine (CLARITIN) 10 mg tablet, Take 10 mg by mouth daily (Patient not taking: Reported on 1/18/2023), Disp: , Rfl:   No Known Allergies      Labs:  Lab Results   Component Value Date    ALT 17 12/19/2022    AST 22 12/19/2022    BUN 10 12/19/2022    CALCIUM 9 8 12/19/2022     12/19/2022    CO2 26 12/19/2022    CREATININE 1 13 12/19/2022    HDL 33 (L) 12/19/2022    HCT 44 2 12/19/2022    HGB 14 3 12/19/2022    HGBA1C 7 9 (H) 12/19/2022     12/19/2022    K 4 3 12/19/2022    TRIG 101 12/19/2022    WBC 6 43 12/19/2022       Imaging: No results found  ECG: Normal sinus rhythm, low voltage QRS    Review of Systems:  Review of Systems   Constitutional: Positive for fatigue  Negative for chills, diaphoresis and fever  HENT: Negative for congestion  Eyes: Negative for photophobia and visual disturbance  Respiratory: Positive for shortness of breath  Negative for chest tightness  Cardiovascular: Negative for chest pain, palpitations and leg swelling  Gastrointestinal: Negative for abdominal distention, abdominal pain, diarrhea, nausea and vomiting  Genitourinary: Negative for difficulty urinating and dysuria  Musculoskeletal: Positive for arthralgias  Negative for gait problem and joint swelling  Skin: Negative for color change, pallor and rash  Neurological: Negative for dizziness, syncope, numbness and headaches  Psychiatric/Behavioral: Negative for agitation, behavioral problems and confusion           Vitals:    02/01/23 0859   BP: 142/78   Pulse: 84      Vitals:    02/01/23 0859   Weight: 118 kg (259 lb 12 8 oz)           Physical Exam:  General appearance:  Appears stated age, alert, well appearing and in no distress  HEENT:  PERRLA, EOMI, no scleral icterus, no conjunctival pallor  NECK:  Supple, No elevated JVP, no thyromegaly, no carotid bruits  HEART:  Regular rate and rhythm, normal S1/S2, no S3/S4, no murmur or rub  LUNGS:  Clear to auscultation bilaterally  ABDOMEN:  Soft, non-tender, positive bowel sounds, no rebound or guarding, no organomegaly   EXTREMITIES:  No edema  VASCULAR:  Normal pedal pulses   SKIN: No lesions or rashes on exposed skin  NEURO:  CN II-XII intact, no focal deficits

## 2023-02-01 ENCOUNTER — CONSULT (OUTPATIENT)
Dept: CARDIOLOGY CLINIC | Facility: CLINIC | Age: 38
End: 2023-02-01

## 2023-02-01 VITALS
SYSTOLIC BLOOD PRESSURE: 142 MMHG | BODY MASS INDEX: 49.09 KG/M2 | HEART RATE: 84 BPM | DIASTOLIC BLOOD PRESSURE: 78 MMHG | WEIGHT: 259.8 LBS

## 2023-02-01 DIAGNOSIS — Z01.810 PREOP CARDIOVASCULAR EXAM: Primary | ICD-10-CM

## 2023-02-01 DIAGNOSIS — E11.9 TYPE 2 DIABETES MELLITUS WITHOUT COMPLICATION, WITHOUT LONG-TERM CURRENT USE OF INSULIN (HCC): ICD-10-CM

## 2023-02-01 DIAGNOSIS — I10 PRIMARY HYPERTENSION: ICD-10-CM

## 2023-02-02 ENCOUNTER — OFFICE VISIT (OUTPATIENT)
Dept: DENTISTRY | Facility: CLINIC | Age: 38
End: 2023-02-02

## 2023-02-02 VITALS — TEMPERATURE: 98.4 F | SYSTOLIC BLOOD PRESSURE: 138 MMHG | DIASTOLIC BLOOD PRESSURE: 85 MMHG | HEART RATE: 87 BPM

## 2023-02-02 DIAGNOSIS — E11.9 TYPE 2 DIABETES MELLITUS WITHOUT COMPLICATION, WITHOUT LONG-TERM CURRENT USE OF INSULIN (HCC): ICD-10-CM

## 2023-02-02 DIAGNOSIS — K02.9 CARIES: Primary | ICD-10-CM

## 2023-02-02 RX ORDER — DULAGLUTIDE 0.75 MG/.5ML
INJECTION, SOLUTION SUBCUTANEOUS
Qty: 2 ML | Refills: 0 | Status: SHIPPED | OUTPATIENT
Start: 2023-02-02

## 2023-02-02 NOTE — PROGRESS NOTES
#31 Composite Filling    Alix Bending presents for composite filling  PMH reviewed, no changes  ASA type 2 significant for diabetes, GERD, and MARIA LUISA  Pt ate today but didn't take her diabetes medication because she takes them later  Pt feels good  Discussed with patient need for RCT if pulp exposure occurs or in future if pulp is inflamed  Pt understands and consents  Tooth has apical radiolucency that could be due to trauma  PPD ranged from 4-6 around 31  #31 has been asymptomatic  Applied topical benzocaine, administered 1 carp 2% lido 1:100k epi via IANB and BNB  Prepped tooth #31-OB with 245 carbide on high speed  Caries removed with round carbide on slow speed  Caries extended into dentin but was not close to pulp  Isolation with cotton rolls and dri-angles    Etched with 37% H2PO4, rinse, dry  Applied Adhese with 20 second scrub once, gentle air dry and light cured for 10s  Restored with Tetric bulk edilson shade A3 and light cured  Refined with finishing burs, polished with enhance point  Verified occlusion and contacts  Pt left satisfied  Nv: #18 Occlusal and give new referral for OS

## 2023-02-09 ENCOUNTER — OFFICE VISIT (OUTPATIENT)
Dept: BARIATRICS | Facility: CLINIC | Age: 38
End: 2023-02-09

## 2023-02-09 VITALS — HEIGHT: 61 IN | BODY MASS INDEX: 49.81 KG/M2 | WEIGHT: 263.8 LBS

## 2023-02-09 DIAGNOSIS — E66.01 OBESITY, CLASS III, BMI 40-49.9 (MORBID OBESITY) (HCC): Primary | ICD-10-CM

## 2023-02-09 NOTE — PROGRESS NOTES
Bariatric Follow Up Nutrition Note    Preop 6/6 wt checks  Preop Program    Type of surgery  Preop  Surgery Date: TBD    Surgeon: Dr Eliceo Cleaning  44 y o   female  Height 5' 1" (1 549 m), weight 120 kg (263 lb 12 8 oz), not currently breastfeeding  Bear [place] [id number] to lose 1-2 lbs/week (sedentary)                                 Estimated protein needs 60-65g                                 Estimated fluid needs 64 oz        Review of History and Medications   Past Medical History:   Diagnosis Date   • Diabetes mellitus ([facility] )    • Hypertension    • Obesity    • Seasonal allergies      Past Surgical History:   Procedure Laterality Date   • CATARACT EXTRACTION Left 06/08/2022   • CATARACT EXTRACTION Right 11/10/2021   • COLONOSCOPY  2013     Social History     Socioeconomic History   • Marital status: Single     Spouse name: Not on file   • Number of children: Not on file   • Years of education: Not on file   • Highest education level: Not on file   Occupational History   • Not on file   Tobacco Use   • Smoking status: Never   • Smokeless tobacco: Never   Vaping Use   • Vaping Use: Never used   Substance and Sexual Activity   • Alcohol use: Never   • Drug use: Never   • Sexual activity: Not Currently     Birth control/protection: Abstinence   Other Topics Concern   • Not on file   Social History Narrative   • Not on file     Social Determinants of Health     Financial Resource Strain: Low Risk    • Difficulty of Paying Living Expenses: Not hard at all   Food Insecurity: No Food Insecurity   • Worried About Running Out of Food in the Last Year: Never true   • Ran Out of Food in the Last Year: Never true   Transportation Needs: No Transportation Needs   • Lack of Transportation (Medical): No   • Lack of Transportation (Non-Medical):  No   Physical Activity: Not on file   Stress: Not on file   Social Connections: Not on file   Intimate Partner Violence: Not on file   Housing Stability: Not on file       Current Outpatient Medications:   •  Blood Glucose Monitoring Suppl (OneTouch Verio Flex System) w/Device KIT, TEST EVERY MORNING, Disp: 1 kit, Rfl: 0  •  Blood Pressure Monitoring (Adult Blood Pressure Cuff Lg) KIT, Use in the morning, Disp: 1 kit, Rfl: 0  •  cetirizine (ZyrTEC) 10 mg tablet, Take 1 tablet (10 mg total) by mouth in the morning , Disp: 30 tablet, Rfl: 1  •  cholecalciferol (VITAMIN D3) 400 units tablet, Take 2,000 Units by mouth daily, Disp: , Rfl:   •  famotidine (PEPCID) 20 mg tablet, Take 1 tablet (20 mg total) by mouth 2 (two) times a day, Disp: 60 tablet, Rfl: 1  •  Insulin Pen Needle 30G X 8 MM MISC, Use once a week, Disp: 100 each, Rfl: 0  •  ketorolac (ACULAR) 0 5 % ophthalmic solution, INSTILL 1 DROP IN LEFT EYE FOUR TIMES DAILY, Disp: , Rfl:   •  lisinopril (ZESTRIL) 10 mg tablet, Take 1 tablet (10 mg total) by mouth daily, Disp: 30 tablet, Rfl: 2  •  loratadine (CLARITIN) 10 mg tablet, Take 10 mg by mouth daily (Patient not taking: Reported on 1/18/2023), Disp: , Rfl:   •  metFORMIN (GLUCOPHAGE) 500 mg tablet, Take 1 tablet (500 mg total) by mouth 2 (two) times a day with meals, Disp: 180 tablet, Rfl: 1  •  Multiple Vitamins-Minerals (multivitamin with minerals) tablet, Take 1 tablet by mouth daily, Disp: , Rfl:   •  OneTouch Delica Lancets 78S MISC, Check blood sugars twice daily  Please substitute with appropriate alternative as covered by patient's insurance   Dx: E11 65, Disp: 200 each, Rfl: 3  •  OneTouch Verio test strip, Use 1 each in the morning Use as instructed, Disp: 100 strip, Rfl: 2  •  prednisoLONE acetate (PRED FORTE) 1 % ophthalmic suspension, Administer 1 drop to both eyes in the morning, Disp: , Rfl:   •  Trulicity 4 16 SQ/4 5AT injection, ADMINISTER 0 75 MG UNDER THE SKIN 1 TIME A WEEK, Disp: 2 mL, Rfl: 0    Food Intake and Lifestyle Assessment   Food Intake Assessment completed via usual diet recall  Breakfast: Ensure Max for breakfast  Snack:  0   Lunch: 0 or protein and rice (Buddhist purposes)  Snack: 0  Dinner: 6pm salad with chicken or shrimp  Snack: fruit or Debra Doone cookie  Beverage intake: water and decaf coffee/tea  Diet texture/stage: regular  Protein supplement: 0  Estimated protein intake per day: >70g  Estimated fluid intake per day: 64 oz  Meals eaten away from home: seldom  Typical meal pattern: 2-3 meals per day and 1-2 snacks per day  Eating Behaviors: Appropriate portion sizes and does not drink with meals, eating rice only once daily, chewing food well    Food allergies or intolerances: none  Cultural or Buddhist considerations:     Physical Assessment      Physical Activity  Types of exercise: Walking 2 hours 2 times/week  Current physical limitations: pain in legs better    Psychosocial Assessment   Support systems: Godmother  Socioeconomic factors: Lives with cousin, not working currently    Nutrition Diagnosis  Diagnosis: Overweight / Obesity (NC-3 3)  Related to: Excessive energy intake and Altered GI function  As Evidenced by: BMI >25     Interventions and Teaching   Patient educated on post-op nutrition guidelines  Patient educated and handouts provided    Surgical changes to stomach / GI  Capacity of post-surgery stomach  Diet progression  Adequate hydration  Sugar and fat restriction to decrease "dumping syndrome"  Fat restriction to decrease steatorrhea  Expected weight loss  Weight loss plateaus/ possibility of weight regain  Exercise  Suggestions for pre-op diet  Nutrition considerations after surgery  Protein supplements  Meal planning and preparation  Appropriate carbohydrate, protein, and fat intake, and food/fluid choices to maximize safe weight loss, nutrient intake, and tolerance   Dietary and lifestyle changes  Possible problems with poor eating habits  Intuitive eating  Techniques for self monitoring and keeping daily food journal  Potential for food intolerance after surgery, and ways to deal with them including: lactose intolerance, nausea, reflux, vomiting, diarrhea, food intolerance, appetite changes, gas  Vitamin / Mineral supplementation of Multivitamin with minerals and Vitamin D  Post-operative pregnancy guidelines    Education provided to: patient-reviewed liver shrinking diet    Barriers to learning: No barriers identified    Readiness to change: preparation    Comprehension: verbalizes understanding     Expected Compliance: good  Workflow:  • Psych and/or D+A Clearance: N/A  • PCP Letter: done  • Support Group: done  • Surgeon Appt done  • EGD done  • Cardiac Risk Assessment done  • Sleep Studies needs to get CPAP  • Blood work done  • Nicotine test N/A  • 6 Month Pre-Operative Program: 6/6 wt checks  • Weight Loss needs to lose 2 lbs      Goals  Food journal, Exercise 30 minutes 5 times per week, Complete lession plans 1-6 and Eat 3 meals per day     Time Spent:   30 Minutes

## 2023-02-15 LAB
DME PARACHUTE DELIVERY DATE ACTUAL: NORMAL
DME PARACHUTE DELIVERY DATE EXPECTED: NORMAL
DME PARACHUTE DELIVERY DATE REQUESTED: NORMAL
DME PARACHUTE DELIVERY NOTE: NORMAL
DME PARACHUTE ITEM DESCRIPTION: NORMAL
DME PARACHUTE ORDER STATUS: NORMAL
DME PARACHUTE SUPPLIER NAME: NORMAL
DME PARACHUTE SUPPLIER PHONE: NORMAL

## 2023-02-21 ENCOUNTER — OFFICE VISIT (OUTPATIENT)
Dept: FAMILY MEDICINE CLINIC | Facility: CLINIC | Age: 38
End: 2023-02-21

## 2023-02-21 VITALS
TEMPERATURE: 96.9 F | BODY MASS INDEX: 50.41 KG/M2 | SYSTOLIC BLOOD PRESSURE: 134 MMHG | DIASTOLIC BLOOD PRESSURE: 76 MMHG | WEIGHT: 267 LBS | OXYGEN SATURATION: 99 % | RESPIRATION RATE: 18 BRPM | HEART RATE: 80 BPM | HEIGHT: 61 IN

## 2023-02-21 DIAGNOSIS — G47.33 OSA (OBSTRUCTIVE SLEEP APNEA): ICD-10-CM

## 2023-02-21 DIAGNOSIS — E11.9 TYPE 2 DIABETES MELLITUS WITHOUT COMPLICATION, WITHOUT LONG-TERM CURRENT USE OF INSULIN (HCC): Primary | ICD-10-CM

## 2023-02-21 DIAGNOSIS — I10 PRIMARY HYPERTENSION: ICD-10-CM

## 2023-02-21 DIAGNOSIS — K21.9 GASTROESOPHAGEAL REFLUX DISEASE WITHOUT ESOPHAGITIS: ICD-10-CM

## 2023-02-21 RX ORDER — DULAGLUTIDE 1.5 MG/.5ML
1.5 INJECTION, SOLUTION SUBCUTANEOUS
Qty: 2 ML | Refills: 1 | Status: SHIPPED | OUTPATIENT
Start: 2023-02-21

## 2023-02-21 NOTE — PROGRESS NOTES
Name: Sukhi Luna      : 1985      MRN: [id number]  Encounter Provider: DEVONTE Hein  Encounter Date: 2023   Encounter department: [address] [place] [address]     1  Type 2 diabetes mellitus without complication, without long-term current use of insulin [facility])  Assessment & Plan:    Lab Results   Component Value Date    HGBA1C 7 9 (H) 2022    HGBA1C 8 0 (A) 2022    HGBA1C 8 2 (H) 2022     A1c improving with Trulicity, tolerating well  Pt has regular follow up Ophthalmology  - Increase Trulicity to 1 5 mg weekly  - Continue metformin 500 mg BID  - Healthy diet and daily physical activity  Orders:  -     Hemoglobin A1C; Future  -     dulaglutide (Trulicity) 1 5 KM/3 3AS injection; Inject 0 5 mL (1 5 mg total) under the skin every 7 days  -     Comprehensive metabolic panel; Future  -     CBC and differential; Future    2  Gastroesophageal reflux disease without esophagitis  Assessment & Plan:  Controlled  - Avoid trigger foods, large meals, and lying down within 2 hours after eating    - Continue famotidine PRN  3  MARIA LUISA (obstructive sleep apnea)  Assessment & Plan:  Pt received and is using the CPAP machine but states device slips off and is uncomfortable due to pt sleeping prone   - Pt to follow up with Sleep Medicine   - Continue to work toward weight loss to improve MARIA LUISA  4  Primary hypertension  Assessment & Plan:  BP at goal in office today: 134/76   - Continue lisinopril 10 mg daily  - Continue low-salt diet and daily physical activity  5  BMI 50 0-59 9, adult [facility])  Assessment & Plan:  Pt reports having completed requirements for weight loss surgery, to be scheduled at next appt with Bariatrics  - Continue with plan as per Weight Management  - Continue walking for exercise, ensuring adequate hydration and stretching with exercise to help prevent muscle fatigue   Try to increase frequency of exercise  Subjective     HPI     Jennifer De La Cruz presented to the office for routine follow up for chronic conditions  Pt has completed the majority of the requirements for weight loss surgery  She states she has lost 14 lbs, has to lose 2 more lbs by her next appt and surgery will be scheduled  Pt is walking twice weekly for exercise, reports legs feel "tired" with walking  Home blood sugars typically 130 to 150, improved since starting Trulicity  Review of Systems   Constitutional: Negative for activity change, appetite change, fatigue, fever and unexpected weight change  Eyes: Negative for visual disturbance  Respiratory: Negative for cough, chest tightness, shortness of breath and wheezing  Cardiovascular: Negative for chest pain, palpitations and leg swelling  Gastrointestinal: Negative for abdominal pain, diarrhea, nausea and vomiting  Genitourinary: Negative for dysuria  Musculoskeletal: Positive for myalgias  Neurological: Negative for dizziness, weakness, light-headedness, numbness and headaches  Psychiatric/Behavioral: Negative for dysphoric mood and sleep disturbance  The patient is not nervous/anxious  All other systems reviewed and are negative        Past Medical History:   Diagnosis Date   • Diabetes mellitus ([facility] [address] )    • Hypertension    • Obesity    • Risk factors for obstructive sleep apnea 8/8/2022   • Seasonal allergies      Past Surgical History:   Procedure Laterality Date   • CATARACT EXTRACTION Left 06/08/2022   • CATARACT EXTRACTION Right 11/10/2021   • COLONOSCOPY  2013     Family History   Problem Relation Age of Onset   • Obesity Mother    • Sleep apnea Neg Hx      Social History     Socioeconomic History   • Marital status: Single     Spouse name: None   • Number of children: None   • Years of education: None   • Highest education level: None   Occupational History   • None   Tobacco Use   • Smoking status: Never   • Smokeless tobacco: Never   Vaping Use   • Vaping Use: Never used   Substance and Sexual Activity   • Alcohol use: Never   • Drug use: Never   • Sexual activity: Not Currently     Birth control/protection: Abstinence   Other Topics Concern   • None   Social History Narrative   • None     Social Determinants of Health     Financial Resource Strain: Low Risk    • Difficulty of Paying Living Expenses: Not hard at all   Food Insecurity: No Food Insecurity   • Worried About Running Out of Food in the Last Year: Never true   • Ran Out of Food in the Last Year: Never true   Transportation Needs: No Transportation Needs   • Lack of Transportation (Medical): No   • Lack of Transportation (Non-Medical): No   Physical Activity: Not on file   Stress: Not on file   Social Connections: Not on file   Intimate Partner Violence: Not on file   Housing Stability: Not on file     Current Outpatient Medications on File Prior to Visit   Medication Sig   • Blood Glucose Monitoring Suppl (OneTouch Verio Flex System) w/Device KIT TEST EVERY MORNING   • Blood Pressure Monitoring (Adult Blood Pressure Cuff Lg) KIT Use in the morning   • cetirizine (ZyrTEC) 10 mg tablet Take 1 tablet (10 mg total) by mouth in the morning     • cholecalciferol (VITAMIN D3) 400 units tablet Take 2,000 Units by mouth daily   • famotidine (PEPCID) 20 mg tablet Take 1 tablet (20 mg total) by mouth 2 (two) times a day   • Insulin Pen Needle 30G X 8 MM MISC Use once a week   • ketorolac (ACULAR) 0 5 % ophthalmic solution INSTILL 1 DROP IN LEFT EYE FOUR TIMES DAILY   • lisinopril (ZESTRIL) 10 mg tablet Take 1 tablet (10 mg total) by mouth daily   • loratadine (CLARITIN) 10 mg tablet Take 10 mg by mouth daily (Patient not taking: Reported on 1/18/2023)   • metFORMIN (GLUCOPHAGE) 500 mg tablet Take 1 tablet (500 mg total) by mouth 2 (two) times a day with meals   • Multiple Vitamins-Minerals (multivitamin with minerals) tablet Take 1 tablet by mouth daily   • OneTouch Delica Lancets 23M MISC Check blood sugars twice daily  Please substitute with appropriate alternative as covered by patient's insurance  Dx: E11 65   • OneTouch Verio test strip Use 1 each in the morning Use as instructed   • prednisoLONE acetate (PRED FORTE) 1 % ophthalmic suspension Administer 1 drop to both eyes in the morning     No Known Allergies    There is no immunization history on file for this patient  Objective     /76 (BP Location: Left arm, Patient Position: Sitting, Cuff Size: Large)   Pulse 80   Temp (!) 96 9 °F (36 1 °C) (Temporal)   Resp 18   Ht 5' 1" (1 549 m)   Wt 121 kg (267 lb)   LMP 01/26/2023 (Approximate)   SpO2 99%   BMI 50 45 kg/m²     Physical Exam  Vitals reviewed  Constitutional:       General: She is not in acute distress  Appearance: She is morbidly obese  She is not ill-appearing or diaphoretic  HENT:      Head: Normocephalic and atraumatic  Cardiovascular:      Rate and Rhythm: Normal rate and regular rhythm  Heart sounds: Normal heart sounds  No murmur heard  Pulmonary:      Effort: Pulmonary effort is normal  No tachypnea  Breath sounds: Normal breath sounds  No decreased breath sounds or wheezing  Skin:     General: Skin is warm and dry  Neurological:      Mental Status: She is alert and oriented to person, place, and time  Psychiatric:         Attention and Perception: Attention normal          Mood and Affect: Mood and affect normal          Speech: Speech normal          Behavior: Behavior normal          Thought Content:  Thought content normal        DEVONTE Jeffrey

## 2023-03-01 PROBLEM — Z91.89 RISK FACTORS FOR OBSTRUCTIVE SLEEP APNEA: Status: RESOLVED | Noted: 2022-08-08 | Resolved: 2023-03-01

## 2023-03-01 NOTE — ASSESSMENT & PLAN NOTE
BP at goal in office today: 134/76   - Continue lisinopril 10 mg daily  - Continue low-salt diet and daily physical activity

## 2023-03-01 NOTE — ASSESSMENT & PLAN NOTE
Pt reports having completed requirements for weight loss surgery, to be scheduled at next appt with Bariatrics  - Continue with plan as per Weight Management  - Continue walking for exercise, ensuring adequate hydration and stretching with exercise to help prevent muscle fatigue  Try to increase frequency of exercise

## 2023-03-01 NOTE — ASSESSMENT & PLAN NOTE
Pt received and is using the CPAP machine but states device slips off and is uncomfortable due to pt sleeping prone   - Pt to follow up with Sleep Medicine   - Continue to work toward weight loss to improve MARIA LUISA

## 2023-03-01 NOTE — ASSESSMENT & PLAN NOTE
Lab Results   Component Value Date    HGBA1C 7 9 (H) 12/19/2022    HGBA1C 8 0 (A) 11/21/2022    HGBA1C 8 2 (H) 08/22/2022     A1c improving with Trulicity, tolerating well  Pt has regular follow up Ophthalmology  - Increase Trulicity to 1 5 mg weekly  - Continue metformin 500 mg BID  - Healthy diet and daily physical activity

## 2023-03-01 NOTE — ASSESSMENT & PLAN NOTE
Controlled  - Avoid trigger foods, large meals, and lying down within 2 hours after eating    - Continue famotidine PRN

## 2023-03-06 DIAGNOSIS — I10 PRIMARY HYPERTENSION: ICD-10-CM

## 2023-03-06 RX ORDER — LISINOPRIL 10 MG/1
TABLET ORAL
Qty: 30 TABLET | Refills: 2 | Status: SHIPPED | OUTPATIENT
Start: 2023-03-06

## 2023-03-09 ENCOUNTER — OFFICE VISIT (OUTPATIENT)
Dept: BARIATRICS | Facility: CLINIC | Age: 38
End: 2023-03-09

## 2023-03-09 DIAGNOSIS — E66.01 OBESITY, CLASS III, BMI 40-49.9 (MORBID OBESITY) (HCC): Primary | ICD-10-CM

## 2023-03-09 NOTE — PROGRESS NOTES
Stone Agustin  is a 44 y o    female    :  1985    Patient presented for Pre-surgical weight check  WT [address] number      7 of 6  Starting weight: 269 6   Submission Target:   262 Today's weight:     266 5     Discussed preparations for surgery and lifestyle change  Working in [facility] as a priority and practicing stress management and coping  Eating behaviors - she is having a protein shake for breakfast or lunch, and eats dinner, and snacks at night while she is watches movies  Was so excited last month she started eating differently as this was a last chance for her to eat sugary and starchy as a last chance  Eating mindlessly  Discussed using the [employer] to log food  Shared that she is juicing fruits and vegetables for her drinks  Wants to stop snacking and really wants to have the surgery  Discussed habitual pairing of movies and snacking  Activity - she stopped walking and is sleeping more  Sleep - She was sleeping a lot more  Progress toward program requirements  Workflow:  · Psych and/or D+A Clearance: n/a  · Surgeon Appt : done  · EGD :    · Support Group: done  · PCP Letter: done  · Cardiac Risk Assessment: done  · Sleep Studies: done got CPAP  · Bloodwork: done  · Nicotine test: n/a  · Submission weight goal: needs to lose 4 obs    Plan:  Return to exercise and eliminating snacking  Next appointment is scheduled for       CECILE Romero, ABIDAW  _____________________________

## 2023-03-09 NOTE — TELEPHONE ENCOUNTER
Received notification that Ozempic was denied by insurance, must try preferred medications Trulicity or Victoza first    Prescription for Trulicity sent 
PAST SURGICAL HISTORY:  History of Billroth II operation

## 2023-04-06 ENCOUNTER — OFFICE VISIT (OUTPATIENT)
Dept: DENTISTRY | Facility: CLINIC | Age: 38
End: 2023-04-06

## 2023-04-06 VITALS — DIASTOLIC BLOOD PRESSURE: 134 MMHG | SYSTOLIC BLOOD PRESSURE: 185 MMHG | HEART RATE: 83 BPM

## 2023-04-06 VITALS — DIASTOLIC BLOOD PRESSURE: 79 MMHG | SYSTOLIC BLOOD PRESSURE: 122 MMHG | TEMPERATURE: 98.2 F | HEART RATE: 79 BPM

## 2023-04-06 DIAGNOSIS — K02.9 CARIES: Primary | ICD-10-CM

## 2023-04-06 DIAGNOSIS — Z01.20 ENCOUNTER FOR DENTAL EXAMINATION: Primary | ICD-10-CM

## 2023-04-06 NOTE — DENTAL PROCEDURE DETAILS
PERIODONTAL MAINTENANCE     Reviewed medical history   ASA II    Periodontal therapy includes removal of bacterial plaque and calculus from supragingival and subgingival regions  Site specific scaling and root planning where indicated, and polishing the teeth  Periodontal therapy recommendation to continue 3 months intervals   Patient asked about treatment plan to restore missing teeth we discussed option for implant (she cant afford) and removable partials   No exam today   NV: 1Restorative #18 Occlusal ( having completed today )   NV: 2 3MRC with exam

## 2023-04-06 NOTE — PROGRESS NOTES
Patient assessment  Maximilian Tolliver presents for composite filling but BP was too high and no treatment was rendered  PMH reviewed  She did not take her HTN mediations today  She also had perio maintence done today  BP was extremely high and decided to reschedule composite appt  Radiolucency noticed by apex of #31 and apex of #24  Endo tested #31 and #24 and both teeth tested vital (3 sec with no lingering)  No treatment is recommended at this time       Pt left satisfied and ambulatory     Nv: #18-O composite

## 2023-05-03 DIAGNOSIS — E11.9 TYPE 2 DIABETES MELLITUS WITHOUT COMPLICATION, WITHOUT LONG-TERM CURRENT USE OF INSULIN (HCC): ICD-10-CM

## 2023-05-04 RX ORDER — DULAGLUTIDE 1.5 MG/.5ML
INJECTION, SOLUTION SUBCUTANEOUS
Qty: 2 ML | Refills: 1 | Status: SHIPPED | OUTPATIENT
Start: 2023-05-04

## 2023-05-11 ENCOUNTER — OFFICE VISIT (OUTPATIENT)
Dept: BARIATRICS | Facility: CLINIC | Age: 38
End: 2023-05-11

## 2023-05-11 VITALS — BODY MASS INDEX: 49.45 KG/M2 | WEIGHT: 261.7 LBS

## 2023-05-11 DIAGNOSIS — E66.01 OBESITY, CLASS III, BMI 40-49.9 (MORBID OBESITY) (HCC): Primary | ICD-10-CM

## 2023-05-11 NOTE — PROGRESS NOTES
Anthony Johnson  is a 43 y o    female    :  1985    Patient presented for Pre-surgical weight check  WT [address] [facility] [address] number      9 of 6  Starting weight: 269 6   Submission Target: 262   Today's weight:     261 7     Discussed preparations for surgery and lifestyle change  Expanding awareness of positive impact of her increased activity  Eating behaviors  She has changes her choices, reducing carbs  She also was only eating one meal a day  Has protein shake for a meal Feeling good  Coping - aware of cravings  She stays focused on her goal  She is eating with great attentioh  Exercise- Walking everyday for a 90 to 120 minutes  Sleep - is using her CPAP and is sleeping well  Progress toward program requirements  Workflow:  · Psych and/or D+A Clearance: n/a  · Surgeon Appt : done  · EGD : done  · Support Group: done  · PCP Letter: done  · Cardiac Risk Assessment:   · Sleep Studies: CPAP compliance completed 4/10  · Bloodwork: re-doing CBC and CMP and and A1C today  · Nicotine test:n/a  · Submission weight goal: met    Plan:  Ready to submit - review blood work  Next appointment is scheduled for 6/15 with [facility]      CECILE Angulo, ABIDAW  _____________________________

## 2023-05-12 ENCOUNTER — APPOINTMENT (OUTPATIENT)
Dept: LAB | Facility: HOSPITAL | Age: 38
End: 2023-05-12

## 2023-05-12 DIAGNOSIS — E11.9 TYPE 2 DIABETES MELLITUS WITHOUT COMPLICATION, WITHOUT LONG-TERM CURRENT USE OF INSULIN (HCC): ICD-10-CM

## 2023-05-12 LAB
ALBUMIN SERPL BCP-MCNC: 3.8 G/DL (ref 3.5–5)
ALP SERPL-CCNC: 33 U/L (ref 34–104)
ALT SERPL W P-5'-P-CCNC: 17 U/L (ref 7–52)
ANION GAP SERPL CALCULATED.3IONS-SCNC: 9 MMOL/L (ref 4–13)
AST SERPL W P-5'-P-CCNC: 17 U/L (ref 13–39)
BASOPHILS # BLD AUTO: 0.04 THOUSANDS/ÂΜL (ref 0–0.1)
BASOPHILS NFR BLD AUTO: 1 % (ref 0–1)
BILIRUB SERPL-MCNC: 0.46 MG/DL (ref 0.2–1)
BUN SERPL-MCNC: 10 MG/DL (ref 5–25)
CALCIUM SERPL-MCNC: 9 MG/DL (ref 8.4–10.2)
CHLORIDE SERPL-SCNC: 105 MMOL/L (ref 96–108)
CO2 SERPL-SCNC: 24 MMOL/L (ref 21–32)
CREAT SERPL-MCNC: 1.04 MG/DL (ref 0.6–1.3)
EOSINOPHIL # BLD AUTO: 0.28 THOUSAND/ÂΜL (ref 0–0.61)
EOSINOPHIL NFR BLD AUTO: 7 % (ref 0–6)
ERYTHROCYTE [DISTWIDTH] IN BLOOD BY AUTOMATED COUNT: 12.2 % (ref 11.6–15.1)
EST. AVERAGE GLUCOSE BLD GHB EST-MCNC: 143 MG/DL
GFR SERPL CREATININE-BSD FRML MDRD: 67 ML/MIN/1.73SQ M
GLUCOSE P FAST SERPL-MCNC: 121 MG/DL (ref 65–99)
HBA1C MFR BLD: 6.6 %
HCT VFR BLD AUTO: 40.7 % (ref 34.8–46.1)
HGB BLD-MCNC: 13.7 G/DL (ref 11.5–15.4)
IMM GRANULOCYTES # BLD AUTO: 0.01 THOUSAND/UL (ref 0–0.2)
IMM GRANULOCYTES NFR BLD AUTO: 0 % (ref 0–2)
LYMPHOCYTES # BLD AUTO: 1.53 THOUSANDS/ÂΜL (ref 0.6–4.47)
LYMPHOCYTES NFR BLD AUTO: 36 % (ref 14–44)
MCH RBC QN AUTO: 30.8 PG (ref 26.8–34.3)
MCHC RBC AUTO-ENTMCNC: 33.7 G/DL (ref 31.4–37.4)
MCV RBC AUTO: 92 FL (ref 82–98)
MONOCYTES # BLD AUTO: 0.36 THOUSAND/ÂΜL (ref 0.17–1.22)
MONOCYTES NFR BLD AUTO: 9 % (ref 4–12)
NEUTROPHILS # BLD AUTO: 2.04 THOUSANDS/ÂΜL (ref 1.85–7.62)
NEUTS SEG NFR BLD AUTO: 47 % (ref 43–75)
NRBC BLD AUTO-RTO: 0 /100 WBCS
PLATELET # BLD AUTO: 255 THOUSANDS/UL (ref 149–390)
PMV BLD AUTO: 10.8 FL (ref 8.9–12.7)
POTASSIUM SERPL-SCNC: 4 MMOL/L (ref 3.5–5.3)
PROT SERPL-MCNC: 7 G/DL (ref 6.4–8.4)
RBC # BLD AUTO: 4.45 MILLION/UL (ref 3.81–5.12)
SODIUM SERPL-SCNC: 138 MMOL/L (ref 135–147)
WBC # BLD AUTO: 4.26 THOUSAND/UL (ref 4.31–10.16)

## 2023-05-15 ENCOUNTER — TELEPHONE (OUTPATIENT)
Dept: BARIATRICS | Facility: CLINIC | Age: 38
End: 2023-05-15

## 2023-05-15 NOTE — TELEPHONE ENCOUNTER
Called and LVM regarding what procedure to submit top insurance  The [facility]NY Gastric Bypass or Sleeve  My callback number is

## 2023-06-01 ENCOUNTER — PREP FOR PROCEDURE (OUTPATIENT)
Dept: BARIATRICS | Facility: CLINIC | Age: 38
End: 2023-06-01

## 2023-06-01 DIAGNOSIS — E11.9 DIABETES MELLITUS (HCC): ICD-10-CM

## 2023-06-01 DIAGNOSIS — G47.33 OBSTRUCTIVE SLEEP APNEA: ICD-10-CM

## 2023-06-01 DIAGNOSIS — K21.9 GASTROESOPHAGEAL REFLUX DISEASE: ICD-10-CM

## 2023-06-01 DIAGNOSIS — E66.01 MORBID OBESITY (HCC): Primary | ICD-10-CM

## 2023-06-01 DIAGNOSIS — I10 HYPERTENSION: ICD-10-CM

## 2023-06-15 ENCOUNTER — OFFICE VISIT (OUTPATIENT)
Dept: BARIATRICS | Facility: CLINIC | Age: 38
End: 2023-06-15

## 2023-06-15 VITALS — HEIGHT: 61 IN | WEIGHT: 265.3 LBS | BODY MASS INDEX: 50.09 KG/M2

## 2023-06-15 DIAGNOSIS — E66.9 OBESITY, UNSPECIFIED: Primary | ICD-10-CM

## 2023-06-15 PROCEDURE — RECHECK

## 2023-06-15 NOTE — PROGRESS NOTES
"Bariatric Follow Up Nutrition Note    Preop  wt checks completed  Preop Program    Type of surgery  Preop  Surgery Date: TBD    Surgeon: Dr Joe Kilgore  43 y o   female  Height 5' 1\" (1 549 m), weight 120 kg (265 lb 4 8 oz), not currently breastfeeding     [id number] to lose 1-2 lbs/week (sedentary)                                 Estimated protein needs 60-65g                                 Estimated fluid needs 64 oz        Review of History and Medications   Past Medical History:   Diagnosis Date   • Diabetes mellitus ([address] )    • Hypertension    • Obesity    • Periodontal disease    • Risk factors for obstructive sleep apnea 08/08/2022   • Seasonal allergies      Past Surgical History:   Procedure Laterality Date   • CATARACT EXTRACTION Left 06/08/2022   • CATARACT EXTRACTION Right 11/10/2021   • COLONOSCOPY  2013     Social History     Socioeconomic History   • Marital status: Single     Spouse name: Not on file   • Number of children: Not on file   • Years of education: Not on file   • Highest education level: Not on file   Occupational History   • Not on file   Tobacco Use   • Smoking status: Never   • Smokeless tobacco: Never   Vaping Use   • Vaping Use: Never used   Substance and Sexual Activity   • Alcohol use: Never   • Drug use: Never   • Sexual activity: Not Currently     Birth control/protection: Abstinence   Other Topics Concern   • Not on file   Social History Narrative   • Not on file     Social Determinants of Health     Financial Resource Strain: Low Risk  (5/19/2022)    Overall Financial Resource Strain (CARDIA)    • Difficulty of Paying Living Expenses: Not hard at all   Food Insecurity: No Food Insecurity (5/19/2022)    Hunger Vital Sign    • Worried About Running Out of Food in the Last Year: Never true    • Ran Out of Food in the Last Year: Never true   Transportation Needs: No Transportation Needs (5/19/2022)    PRAPARE - " Transportation    • Lack of Transportation (Medical): No    • Lack of Transportation (Non-Medical): No   Physical Activity: Not on file   Stress: Not on file   Social Connections: Not on file   Intimate Partner Violence: Not on file   Housing Stability: Not on file       Current Outpatient Medications:   •  Blood Glucose Monitoring Suppl (OneTouch Verio Flex System) w/Device KIT, TEST EVERY MORNING (Patient not taking: Reported on 4/6/2023), Disp: 1 kit, Rfl: 0  •  Blood Pressure Monitoring (Adult Blood Pressure Cuff Lg) KIT, Use in the morning (Patient not taking: Reported on 4/6/2023), Disp: 1 kit, Rfl: 0  •  cetirizine (ZyrTEC) 10 mg tablet, Take 1 tablet (10 mg total) by mouth in the morning  (Patient not taking: Reported on 4/6/2023), Disp: 30 tablet, Rfl: 1  •  cholecalciferol (VITAMIN D3) 400 units tablet, Take 2,000 Units by mouth daily, Disp: , Rfl:   •  famotidine (PEPCID) 20 mg tablet, Take 1 tablet (20 mg total) by mouth 2 (two) times a day, Disp: 60 tablet, Rfl: 1  •  Insulin Pen Needle 30G X 8 MM MISC, Use once a week, Disp: 100 each, Rfl: 0  •  ketorolac (ACULAR) 0 5 % ophthalmic solution, INSTILL 1 DROP IN LEFT EYE FOUR TIMES DAILY, Disp: , Rfl:   •  lisinopril (ZESTRIL) 10 mg tablet, TAKE 1 TABLET(10 MG) BY MOUTH DAILY, Disp: 30 tablet, Rfl: 2  •  loratadine (CLARITIN) 10 mg tablet, Take 10 mg by mouth daily (Patient not taking: Reported on 1/18/2023), Disp: , Rfl:   •  metFORMIN (GLUCOPHAGE) 500 mg tablet, Take 1 tablet (500 mg total) by mouth 2 (two) times a day with meals, Disp: 180 tablet, Rfl: 1  •  Multiple Vitamins-Minerals (multivitamin with minerals) tablet, Take 1 tablet by mouth daily, Disp: , Rfl:   •  OneTouch Delica Lancets 12R MISC, Check blood sugars twice daily  Please substitute with appropriate alternative as covered by patient's insurance   Dx: E11 65, Disp: 200 each, Rfl: 3  •  OneTouch Verio test strip, Use 1 each in the morning Use as instructed, Disp: 100 strip, Rfl: 2  • "prednisoLONE acetate (PRED FORTE) 1 % ophthalmic suspension, Administer 1 drop to both eyes in the morning, Disp: , Rfl:   •  Trulicity 1 5 PU/5 5AX injection, ADMINISTER 1 5 MG UNDER THE SKIN EVERY 7 DAYS, Disp: 2 mL, Rfl: 1    Food Intake and Lifestyle Assessment   Food Intake Assessment completed via usual diet recall-was away for 2 weeks and ate more   Breakfast: Ensure Max for breakfast  Snack:  0   Lunch: 0 or protein and rice (Temple purposes)  Snack: 0  Dinner: 6pm salad with chicken or shrimp  Snack: fruit or Debra Doone cookie  Beverage intake: water and decaf coffee/tea  Diet texture/stage: regular  Protein supplement: 0  Estimated protein intake per day: >70g  Estimated fluid intake per day: 64 oz  Meals eaten away from home: seldom  Typical meal pattern: 2-3 meals per day and 1-2 snacks per day  Eating Behaviors: Appropriate portion sizes and does not drink with meals, eating rice only once daily, chewing food well    Food allergies or intolerances: none  Cultural or Temple considerations:     Physical Assessment      Physical Activity  Types of exercise: Has not been exercising recently  Current physical limitations: pain in legs better    Psychosocial Assessment   Support systems: Godmother  Socioeconomic factors: Lives with cousin, not working currently    Nutrition Diagnosis  Diagnosis: Overweight / Obesity (NC-3 3)  Related to: Excessive energy intake and Altered GI function  As Evidenced by: BMI >25     Interventions and Teaching   Patient educated on post-op nutrition guidelines  Patient educated and handouts provided    Surgical changes to stomach / GI  Capacity of post-surgery stomach  Diet progression  Adequate hydration  Sugar and fat restriction to decrease \"dumping syndrome\"  Fat restriction to decrease steatorrhea  Expected weight loss  Weight loss plateaus/ possibility of weight regain  Exercise  Suggestions for pre-op diet  Nutrition considerations after " surgery  Protein supplements  Meal planning and preparation  Appropriate carbohydrate, protein, and fat intake, and food/fluid choices to maximize safe weight loss, nutrient intake, and tolerance   Dietary and lifestyle changes  Possible problems with poor eating habits  Intuitive eating  Techniques for self monitoring and keeping daily food journal  Potential for food intolerance after surgery, and ways to deal with them including: lactose intolerance, nausea, reflux, vomiting, diarrhea, food intolerance, appetite changes, gas  Vitamin / Mineral supplementation of Multivitamin with minerals and Vitamin D  Post-operative pregnancy guidelines    Education provided to: patient-reviewed liver shrinking diet    Barriers to learning: No barriers identified    Readiness to change: preparation    Comprehension: verbalizes understanding     Expected Compliance: good  Workflow:  • Psych and/or D+A Clearance: N/A  • PCP Letter: done  • Support Group: done  • Surgeon Appt done  • EGD done  • Cardiac Risk Assessment done  • Sleep Studies done  • Blood work done  • Nicotine test N/A  • 6 Month Pre-Operative Program:  wt checks completed  • Weight Loss  • Reviewed liver shrinking diet       Goals  Food journal, Exercise 30 minutes 5 times per week, Complete lession plans 1-6 and Eat 3 meals per day     Time Spent:   30 Minutes

## 2023-06-22 ENCOUNTER — OFFICE VISIT (OUTPATIENT)
Dept: FAMILY MEDICINE CLINIC | Facility: CLINIC | Age: 38
End: 2023-06-22

## 2023-06-22 VITALS
TEMPERATURE: 97.5 F | HEIGHT: 61 IN | WEIGHT: 263 LBS | SYSTOLIC BLOOD PRESSURE: 134 MMHG | DIASTOLIC BLOOD PRESSURE: 84 MMHG | BODY MASS INDEX: 49.65 KG/M2 | HEART RATE: 80 BPM | RESPIRATION RATE: 18 BRPM | OXYGEN SATURATION: 98 %

## 2023-06-22 DIAGNOSIS — I10 PRIMARY HYPERTENSION: ICD-10-CM

## 2023-06-22 DIAGNOSIS — E11.9 TYPE 2 DIABETES MELLITUS WITHOUT COMPLICATION, WITHOUT LONG-TERM CURRENT USE OF INSULIN (HCC): Primary | ICD-10-CM

## 2023-06-22 DIAGNOSIS — E66.01 CLASS 3 SEVERE OBESITY DUE TO EXCESS CALORIES WITH SERIOUS COMORBIDITY AND BODY MASS INDEX (BMI) OF 45.0 TO 49.9 IN ADULT (HCC): ICD-10-CM

## 2023-06-22 PROCEDURE — 99214 OFFICE O/P EST MOD 30 MIN: CPT

## 2023-06-22 NOTE — ASSESSMENT & PLAN NOTE
Lab Results   Component Value Date    HGBA1C 6.6 (H) 05/12/2023    HGBA1C 7.9 (H) 12/19/2022    HGBA1C 8.0 (A) 11/21/2022     Good improvement in A1c on current regimen, at goal.   - Foot exam today WNL. - Due for eye exam, pt to schedule. - Discussed diabetic regimen during 2 week liver shrinking diet prior to surgery with collaborating physician. Pt may continue metformin until two days before surgery, hold day before and day of. Pt administers Trulicity on Mondays, surgery is scheduled for Monday, 7/24/23. Last dose of Trulicity is Monday, 0/85/75.

## 2023-06-22 NOTE — PROGRESS NOTES
Name: Anju Boo      : 1985      MRN: [id number]  Encounter Provider: DEVONTE Quevedo  Encounter Date: 2023   Encounter department: [place] [facility]    Assessment & Plan     1. Type 2 diabetes mellitus without complication, without long-term current use of insulin (HCC)  Assessment & Plan:    Lab Results   Component Value Date    HGBA1C 6.6 (H) 2023    HGBA1C 7.9 (H) 2022    HGBA1C 8.0 (A) 2022     Good improvement in A1c on current regimen, at goal.   - Foot exam today WNL. - Due for eye exam, pt to schedule. - Discussed diabetic regimen during 2 week liver shrinking diet prior to surgery with collaborating physician. Pt may continue metformin until two days before surgery, hold day before and day of. Pt administers Trulicity on , surgery is scheduled for Monday, 23. Last dose of Trulicity is Monday, . Orders:  -     Diabetic foot exam    2. Class 3 severe obesity due to excess calories with serious comorbidity and body mass index (BMI) of 45.0 to 49.9 in adult [facility])  Assessment & Plan:  Bariatric surgery scheduled for 23.  - Continue plan as per Weight Management. 3. Primary hypertension  Assessment & Plan:  BP at goal in office today: 134/84. Likely to improve with weight loss. - Continue lisinopril 10 mg daily. - Continue low-salt diet and daily physical activity. Depression Screening and Follow-up Plan: Patient was screened for depression during today's encounter. They screened negative with a PHQ-2 score of 1. Subjective     HPI     Rahel Rubio presents to the office for diabetes follow up. Pt attributes improved blood sugar to walking for 1.5 hours or more most days, eating right (salads, vegetables, etc), and sleeping well with CPAP. Pt checking blood sugar infrequently at home. Vision has improved, no longer having pain, tearing, itching, or burning.  Due for diabetic eye exam. Pt cannot recall name of eye doctor. Bariatric surgery is scheduled for 7/24/23. Review of Systems   Constitutional: Negative for activity change, appetite change, fatigue, fever and unexpected weight change. Eyes: Negative for visual disturbance (vision improved). Respiratory: Negative for cough, shortness of breath and wheezing. Cardiovascular: Negative for chest pain, palpitations and leg swelling. Gastrointestinal: Negative for abdominal pain, constipation, diarrhea, nausea and vomiting. Endocrine: Negative for polyuria. Genitourinary: Negative for decreased urine volume, difficulty urinating and dysuria. Neurological: Negative for dizziness, weakness, light-headedness, numbness and headaches. All other systems reviewed and are negative.       Past Medical History:   Diagnosis Date   • Diabetes mellitus ([address])    • Hypertension    • Obesity    • Periodontal disease    • Risk factors for obstructive sleep apnea 08/08/2022   • Seasonal allergies      Past Surgical History:   Procedure Laterality Date   • CATARACT EXTRACTION Left 06/08/2022   • CATARACT EXTRACTION Right 11/10/2021   • COLONOSCOPY  2013     Family History   Problem Relation Age of Onset   • Obesity Mother    • Sleep apnea Neg Hx      Social History     Socioeconomic History   • Marital status: Single     Spouse name: None   • Number of children: None   • Years of education: None   • Highest education level: None   Occupational History   • None   Tobacco Use   • Smoking status: Never   • Smokeless tobacco: Never   Vaping Use   • Vaping Use: Never used   Substance and Sexual Activity   • Alcohol use: Never   • Drug use: Never   • Sexual activity: Not Currently     Birth control/protection: Abstinence   Other Topics Concern   • None   Social History Narrative   • None     Social Determinants of Health     Financial Resource Strain: Medium Risk (6/22/2023)    Overall Financial Resource Strain (CARDIA)    • Difficulty of Paying Living Expenses: Somewhat hard   Food Insecurity: Food Insecurity Present (6/22/2023)    Hunger Vital Sign    • Worried About Running Out of Food in the Last Year: Sometimes true    • Ran Out of Food in the Last Year: Sometimes true   Transportation Needs: Unmet Transportation Needs (6/22/2023)    PRAPARE - Transportation    • Lack of Transportation (Medical): Yes    • Lack of Transportation (Non-Medical): Yes   Physical Activity: Not on file   Stress: Not on file   Social Connections: Not on file   Intimate Partner Violence: Not on file   Housing Stability: Not on file     Current Outpatient Medications on File Prior to Visit   Medication Sig   • Blood Glucose Monitoring Suppl (OneTouch Verio Flex System) w/Device KIT TEST EVERY MORNING (Patient not taking: Reported on 4/6/2023)   • Blood Pressure Monitoring (Adult Blood Pressure Cuff Lg) KIT Use in the morning (Patient not taking: Reported on 4/6/2023)   • cetirizine (ZyrTEC) 10 mg tablet Take 1 tablet (10 mg total) by mouth in the morning. (Patient not taking: Reported on 4/6/2023)   • cholecalciferol (VITAMIN D3) 400 units tablet Take 2,000 Units by mouth daily   • famotidine (PEPCID) 20 mg tablet Take 1 tablet (20 mg total) by mouth 2 (two) times a day   • Insulin Pen Needle 30G X 8 MM MISC Use once a week   • ketorolac (ACULAR) 0.5 % ophthalmic solution INSTILL 1 DROP IN LEFT EYE FOUR TIMES DAILY   • lisinopril (ZESTRIL) 10 mg tablet TAKE 1 TABLET(10 MG) BY MOUTH DAILY   • loratadine (CLARITIN) 10 mg tablet Take 10 mg by mouth daily (Patient not taking: Reported on 1/18/2023)   • metFORMIN (GLUCOPHAGE) 500 mg tablet Take 1 tablet (500 mg total) by mouth 2 (two) times a day with meals   • Multiple Vitamins-Minerals (multivitamin with minerals) tablet Take 1 tablet by mouth daily   • OneTouch Delica Lancets 73P MISC Check blood sugars twice daily. Please substitute with appropriate alternative as covered by patient's insurance.  Dx: E11.65   • OneTouch Verio test strip Use 1 each in the morning Use as instructed   • prednisoLONE acetate (PRED FORTE) 1 % ophthalmic suspension Administer 1 drop to both eyes in the morning   • Trulicity 1.5 XY/1.4HH injection ADMINISTER 1.5 MG UNDER THE SKIN EVERY 7 DAYS     No Known Allergies    There is no immunization history on file for this patient. Objective     /84 (BP Location: Left arm, Patient Position: Sitting, Cuff Size: Large)   Pulse 80   Temp 97.5 °F (36.4 °C) (Temporal)   Resp 18   Ht 5' 1" (1.549 m)   Wt 119 kg (263 lb)   SpO2 98%   BMI 49.69 kg/m²     Physical Exam  Vitals reviewed. Constitutional:       General: She is not in acute distress. Appearance: She is morbidly obese. HENT:      Head: Normocephalic and atraumatic. Eyes:      General: Lids are normal.      Conjunctiva/sclera: Conjunctivae normal.      Pupils: Pupils are equal, round, and reactive to light. Cardiovascular:      Rate and Rhythm: Normal rate and regular rhythm. Pulses: Normal pulses. no weak pulses          Dorsalis pedis pulses are 2+ on the right side and 2+ on the left side. Heart sounds: Normal heart sounds. No murmur heard. Pulmonary:      Effort: Pulmonary effort is normal. No tachypnea. Breath sounds: Normal breath sounds. No decreased breath sounds or wheezing. Musculoskeletal:      Right lower leg: No edema. Left lower leg: No edema. Right foot: Normal range of motion. No deformity. Left foot: Normal range of motion. No deformity. Feet:      Right foot:      Protective Sensation: 10 sites tested. 10 sites sensed. Skin integrity: Skin integrity normal. No ulcer, skin breakdown, erythema, warmth, callus or dry skin. Toenail Condition: Right toenails are normal.      Left foot:      Protective Sensation: 10 sites tested. 10 sites sensed. Skin integrity: Skin integrity normal. No ulcer, skin breakdown, erythema, warmth, callus or dry skin.       Toenail Condition: Left toenails are normal.   Skin:     General: Skin is warm and dry. Neurological:      Mental Status: She is alert and oriented to person, place, and time. Motor: Motor function is intact. No weakness. Gait: Gait is intact. Psychiatric:         Attention and Perception: Attention normal.         Mood and Affect: Mood and affect normal.         Speech: Speech normal.         Behavior: Behavior normal.         Thought Content: Thought content normal.     Diabetic Foot Exam    Patient's shoes and socks removed. Right Foot/Ankle   Right Foot Inspection  Skin Exam: skin normal and skin intact. No dry skin, no warmth, no callus, no erythema, no maceration, no abnormal color, no pre-ulcer, no ulcer and no callus. Toe Exam: ROM and strength within normal limits. Sensory   Monofilament testing: intact    Vascular  Capillary refills: < 3 seconds  The right DP pulse is 2+. Left Foot/Ankle  Left Foot Inspection  Skin Exam: skin normal and skin intact. No dry skin, no warmth, no erythema, no maceration, normal color, no pre-ulcer, no ulcer and no callus. Toe Exam: ROM and strength within normal limits. Sensory   Monofilament testing: intact    Vascular  Capillary refills: < 3 seconds  The left DP pulse is 2+.      Assign Risk Category  No deformity present  No loss of protective sensation  No weak pulses  Risk: 0      DEVONTE Rosas

## 2023-07-01 DIAGNOSIS — K21.9 GASTROESOPHAGEAL REFLUX DISEASE, UNSPECIFIED WHETHER ESOPHAGITIS PRESENT: ICD-10-CM

## 2023-07-02 PROBLEM — E66.813 CLASS 3 SEVERE OBESITY DUE TO EXCESS CALORIES WITH SERIOUS COMORBIDITY AND BODY MASS INDEX (BMI) OF 45.0 TO 49.9 IN ADULT (HCC): Status: ACTIVE | Noted: 2022-05-19

## 2023-07-02 PROBLEM — E66.01 CLASS 3 SEVERE OBESITY DUE TO EXCESS CALORIES WITH SERIOUS COMORBIDITY AND BODY MASS INDEX (BMI) OF 45.0 TO 49.9 IN ADULT (HCC): Status: ACTIVE | Noted: 2022-05-19

## 2023-07-02 NOTE — ASSESSMENT & PLAN NOTE
BP at goal in office today: 134/84. Likely to improve with weight loss. - Continue lisinopril 10 mg daily. - Continue low-salt diet and daily physical activity.

## 2023-07-03 RX ORDER — FAMOTIDINE 20 MG/1
TABLET, FILM COATED ORAL
Qty: 60 TABLET | Refills: 2 | Status: SHIPPED | OUTPATIENT
Start: 2023-07-03

## 2023-07-06 ENCOUNTER — OFFICE VISIT (OUTPATIENT)
Dept: BARIATRICS | Facility: CLINIC | Age: 38
End: 2023-07-06
Payer: MEDICARE

## 2023-07-06 ENCOUNTER — OFFICE VISIT (OUTPATIENT)
Dept: BARIATRICS | Facility: CLINIC | Age: 38
End: 2023-07-06

## 2023-07-06 ENCOUNTER — CLINICAL SUPPORT (OUTPATIENT)
Dept: BARIATRICS | Facility: CLINIC | Age: 38
End: 2023-07-06

## 2023-07-06 VITALS
TEMPERATURE: 98.7 F | WEIGHT: 263.6 LBS | HEART RATE: 73 BPM | BODY MASS INDEX: 49.77 KG/M2 | HEIGHT: 61 IN | DIASTOLIC BLOOD PRESSURE: 100 MMHG | SYSTOLIC BLOOD PRESSURE: 130 MMHG | OXYGEN SATURATION: 99 %

## 2023-07-06 DIAGNOSIS — E66.01 OBESITY, CLASS III, BMI 40-49.9 (MORBID OBESITY) (HCC): Primary | ICD-10-CM

## 2023-07-06 DIAGNOSIS — E66.01 CLASS 3 SEVERE OBESITY DUE TO EXCESS CALORIES WITH SERIOUS COMORBIDITY AND BODY MASS INDEX (BMI) OF 45.0 TO 49.9 IN ADULT (HCC): Primary | ICD-10-CM

## 2023-07-06 PROCEDURE — 99213 OFFICE O/P EST LOW 20 MIN: CPT | Performed by: SURGERY

## 2023-07-06 PROCEDURE — RECHECK: Performed by: DIETITIAN, REGISTERED

## 2023-07-06 RX ORDER — SCOLOPAMINE TRANSDERMAL SYSTEM 1 MG/1
1 PATCH, EXTENDED RELEASE TRANSDERMAL ONCE
OUTPATIENT
Start: 2023-07-06 | End: 2023-07-06

## 2023-07-06 RX ORDER — ENOXAPARIN SODIUM 100 MG/ML
40 INJECTION SUBCUTANEOUS
OUTPATIENT
Start: 2023-07-07 | End: 2023-07-08

## 2023-07-06 RX ORDER — ACETAMINOPHEN 325 MG/1
975 TABLET ORAL ONCE
OUTPATIENT
Start: 2023-07-06 | End: 2023-07-06

## 2023-07-06 RX ORDER — CELECOXIB 200 MG/1
200 CAPSULE ORAL ONCE
OUTPATIENT
Start: 2023-07-06 | End: 2023-07-06

## 2023-07-06 RX ORDER — CEFAZOLIN SODIUM 2 G/50ML
2000 SOLUTION INTRAVENOUS ONCE
OUTPATIENT
Start: 2023-07-06 | End: 2023-07-06

## 2023-07-06 NOTE — PROGRESS NOTES
BARIATRIC HISTORY AND PHYSICAL - BARIATRIC SURGERY  Lena Antunez 43 y.o. female MRN: [id number]  Unit/Bed#:  Encounter: [id number]      HPI:  Lena Antunez is a 43 y.o. female who presents to review their preoperative workup and see if they are a good candidate to undergo a bariatric procedure. Review of Systems   Constitutional: Negative for chills and fever. HENT: Negative for ear pain and sore throat. Eyes: Negative for pain and visual disturbance. Respiratory: Negative for cough and shortness of breath. Cardiovascular: Negative for chest pain and palpitations. Gastrointestinal: Negative for abdominal pain and vomiting. Genitourinary: Negative for dysuria and hematuria. Musculoskeletal: Negative for arthralgias and back pain. Skin: Negative for color change and rash. Neurological: Negative for seizures and syncope. All other systems reviewed and are negative. Historical Information   Past Medical History:   Diagnosis Date   • Diabetes mellitus ([address])    • Hypertension    • Obesity    • Periodontal disease    • Risk factors for obstructive sleep apnea 08/08/2022   • Seasonal allergies      Past Surgical History:   Procedure Laterality Date   • CATARACT EXTRACTION Left 06/08/2022   • CATARACT EXTRACTION Right 11/10/2021   • COLONOSCOPY  2013     Social History   Social History     Substance and Sexual Activity   Alcohol Use Never     Social History     Substance and Sexual Activity   Drug Use Never     Social History     Tobacco Use   Smoking Status Never   Smokeless Tobacco Never     Family History:   Family History   Problem Relation Age of Onset   • Obesity Mother    • Sleep apnea Neg Hx        Meds/Allergies   all medications and allergies reviewed  No Known Allergies    Objective     Current Vitals:      bowel movement  [unfilled]    Invasive Devices     None                 Physical Exam  Constitutional:       Appearance: Normal appearance. She is obese.    HENT:      Head: Normocephalic and atraumatic. Nose: Nose normal.      Mouth/Throat:      Mouth: Mucous membranes are moist.   Eyes:      Extraocular Movements: Extraocular movements intact. Pupils: Pupils are equal, round, and reactive to light. Cardiovascular:      Rate and Rhythm: Normal rate and regular rhythm. Pulses: Normal pulses. Heart sounds: Normal heart sounds. Pulmonary:      Effort: Pulmonary effort is normal.      Breath sounds: Normal breath sounds. Abdominal:      Palpations: Abdomen is soft. Comments: No rashes   Musculoskeletal:      Cervical back: Normal range of motion. Skin:     General: Skin is warm. Neurological:      General: No focal deficit present. Mental Status: She is alert and oriented to person, place, and time. Psychiatric:         Mood and Affect: Mood normal.         Behavior: Behavior normal.         Lab Results: I have personally reviewed pertinent lab results. Imaging: I have personally reviewed pertinent reports. EKG, Pathology, and Other Studies: I have personally reviewed pertinent reports. [address] [facility]  [address] [place] [address]  [phone]        DATE OF SERVICE:  12/21/22     PHYSICIAN(S):  Attending:   Fredrick Hemphill MD      Fellow:   No Staff Documented         INDICATION:  Bariatric surgery status     POST-OP DIAGNOSIS:  See the impression below.     PREPROCEDURE:  Informed consent was obtained for the procedure, including sedation. Risks of perforation, hemorrhage, adverse drug reaction and aspiration were discussed. The patient was placed in the left lateral decubitus position.     Patient was explained about the risks and benefits of the procedure. Risks including but not limited to bleeding, infection, and perforation were explained in detail.  Also explained about less than 100% sensitivity with the exam and other alternatives.     PROCEDURE: EGD     DETAILS OF PROCEDURE:   Patient was taken to the procedure room where a time out was performed to confirm correct patient and correct procedure. The patient underwent monitored anesthesia care, which was administered by an anesthesia professional. The patient's blood pressure, heart rate, level of consciousness, respirations and oxygen were monitored throughout the procedure. The scope was advanced to the second part of the duodenum. Retroflexion was performed in the fundus. The patient experienced no blood loss. The procedure was not difficult. The patient tolerated the procedure well. There were no apparent complications.      ANESTHESIA INFORMATION:  ASA: III  Anesthesia Type: General     MEDICATIONS:  No administrations occurring from 0752 to 0757 on 12/21/22         FINDINGS:  • Regular Z-line 36 cm from the incisors  • The esophagus, stomach and duodenum appeared normal. Very poor visualization because of food residue in the stomach however the part of mucosa that was visualized was normal  Could not traverse the pylorus into the duodenum because of food residue  • Performed single forceps biopsy in the antrum        SPECIMENS:  * No specimens in log *        IMPRESSION:  Inadequate visualization because of food residue  Suggestive of gastroparesis  Biopsy to rule out H. pylori pending     RECOMMENDATION:    There is no recommended follow-up for this procedure.        Alisa Wang MD       Final Diagnosis   A. Stomach, biopsy:  -Gastric oxyntic and transitional mucosa with minimal chronic inactive inflammation.  -Negative for Helicobacter pylori organisms on H&E stain. Code Status: [unfilled]  Advance Directive and Living Will:      Power of :    POLST:        Assessment/Plan:    The patient presented to review the preoperative workup and see if bariatric surgery is appropriate and indicated following the extensive preoperative workup and the enrollment in our weight loss program.  Preoperative workup was complete. Results were reviewed with the patient including the blood work results and the endoscopy findings and the biopsy results. We also reviewed the cardiology evaluation. The patient was determined to be a good candidate for Laparoscopic Sleeve Gastrectomy with robotic assist.  ----------------------------------------------------------------------  Smoking: Denies  Blood thinner use: Denies  Home pain medication use and who manages it: Denies  CPAP use: Yes (If yes, sleep study obtained and reminded pt to bring CPAP to hospital)  History of blood clots: Denies  Previous abdominal surgeries: Denies  Cardiac clearance obtained: 2/1/23   EKG performed: 2/1/23  EGD prior to surgery: 12/21/22  Screening Labs obtained (CBC, CMP): 5/12/23  H. Pylori status: Negative  Medication allergies: NKDA  SLIM consult Post-Op: Yes  Reminded patient about 2 week pre-op liquid diet: Yes  10% body weight loss pre-operatively met/discussed: Yes   Consent signed: Yes  Pre-Op ERAS Orders placed: DONE  -----------------------------------------------------------------------  Risks and benefits explained one more time to the patient. Alternatives to surgery and alternative forms of surgery were also explained. Post-surgical commitment and after care programs were explained. Consent was signed. Questions were answered and concerns were addressed. Patient will need to start the 2 week liquid diet prior to surgery. Patient wishes to proceed. As per [address] guidelines, I had a discussion with the patient regarding their CODE STATUS in the perioperative period and the patient is level 1 or FULL CODE STATUS.     Stas Lam PA-C  Bariatric Surgery   7/6/2023  2:23 PM

## 2023-07-06 NOTE — H&P (VIEW-ONLY)
BARIATRIC HISTORY AND PHYSICAL - BARIATRIC SURGERY  Jana Lane 43 y.o. female MRN: [id number]  Unit/Bed#:  Encounter: [id number]      HPI:  Jana Lane is a 43 y.o. female who presents to review their preoperative workup and see if they are a good candidate to undergo a bariatric procedure. Review of Systems   Constitutional: Negative for chills and fever. HENT: Negative for ear pain and sore throat. Eyes: Negative for pain and visual disturbance. Respiratory: Negative for cough and shortness of breath. Cardiovascular: Negative for chest pain and palpitations. Gastrointestinal: Negative for abdominal pain and vomiting. Genitourinary: Negative for dysuria and hematuria. Musculoskeletal: Negative for arthralgias and back pain. Skin: Negative for color change and rash. Neurological: Negative for seizures and syncope. All other systems reviewed and are negative. Historical Information   Past Medical History:   Diagnosis Date   • Diabetes mellitus ([address])    • Hypertension    • Obesity    • Periodontal disease    • Risk factors for obstructive sleep apnea 08/08/2022   • Seasonal allergies      Past Surgical History:   Procedure Laterality Date   • CATARACT EXTRACTION Left 06/08/2022   • CATARACT EXTRACTION Right 11/10/2021   • COLONOSCOPY  2013     Social History   Social History     Substance and Sexual Activity   Alcohol Use Never     Social History     Substance and Sexual Activity   Drug Use Never     Social History     Tobacco Use   Smoking Status Never   Smokeless Tobacco Never     Family History:   Family History   Problem Relation Age of Onset   • Obesity Mother    • Sleep apnea Neg Hx        Meds/Allergies   all medications and allergies reviewed  No Known Allergies    Objective     Current Vitals:      bowel movement  [unfilled]    Invasive Devices     None                 Physical Exam  Constitutional:       Appearance: Normal appearance. She is obese.    HENT:      Head: Normocephalic and atraumatic. Nose: Nose normal.      Mouth/Throat:      Mouth: Mucous membranes are moist.   Eyes:      Extraocular Movements: Extraocular movements intact. Pupils: Pupils are equal, round, and reactive to light. Cardiovascular:      Rate and Rhythm: Normal rate and regular rhythm. Pulses: Normal pulses. Heart sounds: Normal heart sounds. Pulmonary:      Effort: Pulmonary effort is normal.      Breath sounds: Normal breath sounds. Abdominal:      Palpations: Abdomen is soft. Comments: No rashes   Musculoskeletal:      Cervical back: Normal range of motion. Skin:     General: Skin is warm. Neurological:      General: No focal deficit present. Mental Status: She is alert and oriented to person, place, and time. Psychiatric:         Mood and Affect: Mood normal.         Behavior: Behavior normal.         Lab Results: I have personally reviewed pertinent lab results. Imaging: I have personally reviewed pertinent reports. EKG, Pathology, and Other Studies: I have personally reviewed pertinent reports. [id number] [facility]  [address] [place] [address] Road  [phone]        DATE OF SERVICE:  12/21/22     PHYSICIAN(S):  Attending:   Carlo Paige MD      Fellow:   No Staff Documented         INDICATION:  Bariatric surgery status     POST-OP DIAGNOSIS:  See the impression below.     PREPROCEDURE:  Informed consent was obtained for the procedure, including sedation. Risks of perforation, hemorrhage, adverse drug reaction and aspiration were discussed. The patient was placed in the left lateral decubitus position.     Patient was explained about the risks and benefits of the procedure. Risks including but not limited to bleeding, infection, and perforation were explained in detail.  Also explained about less than 100% sensitivity with the exam and other alternatives.     PROCEDURE: EGD     DETAILS OF PROCEDURE:   Patient was taken to the procedure room where a time out was performed to confirm correct patient and correct procedure. The patient underwent monitored anesthesia care, which was administered by an anesthesia professional. The patient's blood pressure, heart rate, level of consciousness, respirations and oxygen were monitored throughout the procedure. The scope was advanced to the second part of the duodenum. Retroflexion was performed in the fundus. The patient experienced no blood loss. The procedure was not difficult. The patient tolerated the procedure well. There were no apparent complications.      ANESTHESIA INFORMATION:  ASA: III  Anesthesia Type: General     MEDICATIONS:  No administrations occurring from 0752 to 0757 on 12/21/22         FINDINGS:  • Regular Z-line 36 cm from the incisors  • The esophagus, stomach and duodenum appeared normal. Very poor visualization because of food residue in the stomach however the part of mucosa that was visualized was normal  Could not traverse the pylorus into the duodenum because of food residue  • Performed single forceps biopsy in the antrum        SPECIMENS:  * No specimens in log *        IMPRESSION:  Inadequate visualization because of food residue  Suggestive of gastroparesis  Biopsy to rule out H. pylori pending     RECOMMENDATION:    There is no recommended follow-up for this procedure.        Yashira Vick MD       Final Diagnosis   A. Stomach, biopsy:  -Gastric oxyntic and transitional mucosa with minimal chronic inactive inflammation.  -Negative for Helicobacter pylori organisms on H&E stain. Code Status: [unfilled]  Advance Directive and Living Will:      Power of :    POLST:        Assessment/Plan:    The patient presented to review the preoperative workup and see if bariatric surgery is appropriate and indicated following the extensive preoperative workup and the enrollment in our weight loss program.  Preoperative workup was complete. Results were reviewed with the patient including the blood work results and the endoscopy findings and the biopsy results. We also reviewed the cardiology evaluation. The patient was determined to be a good candidate for Laparoscopic Sleeve Gastrectomy with robotic assist.  ----------------------------------------------------------------------  Smoking: Denies  Blood thinner use: Denies  Home pain medication use and who manages it: Denies  CPAP use: Yes (If yes, sleep study obtained and reminded pt to bring CPAP to hospital)  History of blood clots: Denies  Previous abdominal surgeries: Denies  Cardiac clearance obtained: 2/1/23   EKG performed: 2/1/23  EGD prior to surgery: 12/21/22  Screening Labs obtained (CBC, CMP): 5/12/23  H. Pylori status: Negative  Medication allergies: NKDA  SLIM consult Post-Op: Yes  Reminded patient about 2 week pre-op liquid diet: Yes  10% body weight loss pre-operatively met/discussed: Yes   Consent signed: Yes  Pre-Op ERAS Orders placed: DONE  -----------------------------------------------------------------------  Risks and benefits explained one more time to the patient. Alternatives to surgery and alternative forms of surgery were also explained. Post-surgical commitment and after care programs were explained. Consent was signed. Questions were answered and concerns were addressed. Patient will need to start the 2 week liquid diet prior to surgery. Patient wishes to proceed. As per [address] guidelines, I had a discussion with the patient regarding their CODE STATUS in the perioperative period and the patient is level 1 or FULL CODE STATUS.     Larissa Farmer PA-C  Bariatric Surgery   7/6/2023  2:23 PM

## 2023-07-06 NOTE — PROGRESS NOTES
Pt. educated on the pre operative liver shrinking diet. Pt understands that the diet needs to be followed for 2 weeks prior to surgery. Handout reviewed. Emphasized the need to drink 80 ounces of fluid per day while on the diet. Emphasized no vegetables/solid food the last two days of the diet.   Contact information for any questions/concerns

## 2023-07-06 NOTE — PROGRESS NOTES
Pt attended pre-op education session. Standardized packet of information for bariatric surgery was provided and was reviewed with pt. Importance of lifestyle change and development of regular exercise routine stressed. Pt given the opportunity to ask questions. Ensure pre-surgery drink instructions were given. Questions were answered. Pt verbalized understanding of all information provided. Pt appeared prepared for upcoming surgery. Pt. educated on two-week pre operative liver shrinking diet. Pt understands that the diet needs to be followed for 2 weeks prior to surgery. Handout reviewed. Emphasized the need to drink 80 ounces of fluid per day while on the diet. Reviewed pre-op ERAS drink, post-operative clear liquid, full liquid, and pureed diet, post-operative nutrition rules and facts, and post-operative bariatric multivitamin/mineral recommendations and brand comparison. Contact information provided for any questions/concerns. Patient was able to verbalize basic diet (protein, fluid, vitamin and mineral) recommendations and possible nutrition-related complications.  Yes

## 2023-07-07 DIAGNOSIS — E66.01 MORBID (SEVERE) OBESITY DUE TO EXCESS CALORIES (HCC): Primary | ICD-10-CM

## 2023-07-07 NOTE — PRE-PROCEDURE INSTRUCTIONS
Pre-Surgery Instructions:   Medication Instructions   • cetirizine (ZyrTEC) 10 mg tablet Uses PRN- OK to take day of surgery, zyrtec or claritin, not both   • cholecalciferol (VITAMIN D3) 400 units tablet Hold day of surgery. • famotidine (PEPCID) 20 mg tablet Uses PRN- OK to take day of surgery   • lisinopril (ZESTRIL) 10 mg tablet Instructions provided by MD   • loratadine (CLARITIN) 10 mg tablet Uses PRN- OK to take day of surgery, zyrtec or claritin, not both   • metFORMIN (GLUCOPHAGE) 500 mg tablet Hold day of surgery   • Multiple Vitamins-Minerals (multivitamin with minerals) tablet Stop taking 7 days prior to surgery. Medication instructions for day surgery reviewed. Please use only a sip of water to take your instructed medications. Avoid all over the counter vitamins, supplements and NSAIDS for one week prior to surgery per anesthesia guidelines. Tylenol is ok to take as needed. You will receive a call one business day prior to surgery with an arrival time and hospital directions. If your surgery is scheduled on a Monday, the hospital will be calling you on the Friday prior to your surgery. If you have not heard from anyone by 8pm, please call the hospital supervisor through the hospital  at [phone]. Rui Hou [phone]). Do not eat or drink anything after midnight the night before your surgery, including candy, mints, lifesavers, or chewing gum. Do not drink alcohol 24hrs before your surgery. Try not to smoke at least 24hrs before your surgery. Follow the pre surgery showering instructions as listed in the [facility] Surgical Experience Booklet” or otherwise provided by your surgeon's office. Do not shave the surgical area 24 hours before surgery. Do not apply any lotions, creams, including makeup, cologne, deodorant, or perfumes after showering on the day of your surgery. No contact lenses, eye make-up, or artificial eyelashes.  Remove nail polish, including gel polish, and any artificial, gel, or acrylic nails if possible. Remove all jewelry including rings and body piercing jewelry. Wear causal clothing that is easy to take on and off. Consider your type of surgery. Keep any valuables, jewelry, piercings at home. Please bring any specially ordered equipment (sling, braces) if indicated. Arrange for a responsible person to drive you to and from the hospital on the day of your surgery. Visitor Guidelines discussed. Call the surgeon's office with any new illnesses, exposures, or additional questions prior to surgery. Please reference your [facility] Surgical Experience Booklet” for additional information to prepare for your upcoming surgery.

## 2023-07-10 RX ORDER — OMEPRAZOLE 20 MG/1
20 CAPSULE, DELAYED RELEASE ORAL DAILY
Qty: 30 CAPSULE | Refills: 3 | Status: SHIPPED | OUTPATIENT
Start: 2023-07-25

## 2023-07-10 RX ORDER — OXYCODONE HYDROCHLORIDE 5 MG/1
5 TABLET ORAL EVERY 4 HOURS PRN
Qty: 10 TABLET | Refills: 0 | Status: SHIPPED | OUTPATIENT
Start: 2023-07-25

## 2023-07-10 RX ORDER — ENOXAPARIN SODIUM 100 MG/ML
40 INJECTION SUBCUTANEOUS DAILY
Qty: 5.2 ML | Refills: 0 | Status: SHIPPED | OUTPATIENT
Start: 2023-07-25 | End: 2023-08-07

## 2023-07-17 ENCOUNTER — TELEPHONE (OUTPATIENT)
Dept: BARIATRICS | Facility: CLINIC | Age: 38
End: 2023-07-17

## 2023-07-17 NOTE — TELEPHONE ENCOUNTER
Pre-op call was made to patient to follow up on how they are doing and to remind them to continue with all medical and dietary directions that were given at pre-op class regarding liver shrinking diet and hydration. They were encouraged to purchase all vitamins and protein shakes for post op use as well as to begin Miralax three days prior to surgery as directed in Section 6 of their manual.  They were reminded of the Ensure Pre-surgery drinks protocol and to bring their completed yellow form with them to surgery as well as their CPAP-BiPAP machine if they use one. Lastly, they were informed that they would be weighed the morning of surgery and to give the office a call if they had any further questions or concerns.

## 2023-07-20 ENCOUNTER — TELEPHONE (OUTPATIENT)
Dept: OBGYN CLINIC | Facility: CLINIC | Age: 38
End: 2023-07-20

## 2023-07-20 NOTE — TELEPHONE ENCOUNTER
Pt unsure where she wants to be seen [facility] on [facility] or [facility], given phone number for [facility] if she decides to schedule with [facility]

## 2023-07-21 ENCOUNTER — ANESTHESIA EVENT (OUTPATIENT)
Dept: PERIOP | Facility: HOSPITAL | Age: 38
DRG: 403 | End: 2023-07-21
Payer: MEDICARE

## 2023-07-23 NOTE — ANESTHESIA PREPROCEDURE EVALUATION
Procedure:  GASTRECTOMY SLEEVE W/ ROBOT & INTRAOPERATIVE EGD (Abdomen)    Relevant Problems   CARDIO   (+) Hypertension      ENDO   (+) Type 2 diabetes mellitus without complication, without long-term current use of insulin (HCC)      GI/HEPATIC   (+) Gastroesophageal reflux disease      PULMONARY   (+) MARIA LUISA (obstructive sleep apnea)      Other   (+) Abnormal ECG (EKG 2/2023 normal)   (+) Class 3 severe obesity due to excess calories with serious comorbidity and body mass index (BMI) of 45.0 to 49.9 in adult [facility])        Physical Exam    Airway    Mallampati score: II  TM Distance: <3 FB       Dental   No notable dental hx     Cardiovascular  Cardiovascular exam normal    Pulmonary  Pulmonary exam normal     Other Findings        Anesthesia Plan  ASA Score- 3     Anesthesia Type- general with ASA Monitors. Additional Monitors:   Airway Plan: ETT. Comment: Glu 184, IVF bolus given. Plan Factors-Exercise tolerance (METS): >4 METS. Chart reviewed. Existing labs reviewed. Patient is not a current smoker. Obstructive sleep apnea risk education given perioperatively. Induction- intravenous. Postoperative Plan- Plan for postoperative opioid use. Informed Consent- Anesthetic plan and risks discussed with patient.

## 2023-07-24 ENCOUNTER — ANESTHESIA (OUTPATIENT)
Dept: PERIOP | Facility: HOSPITAL | Age: 38
DRG: 403 | End: 2023-07-24
Payer: MEDICARE

## 2023-07-24 ENCOUNTER — HOSPITAL ENCOUNTER (INPATIENT)
Facility: HOSPITAL | Age: 38
LOS: 1 days | Discharge: HOME | DRG: 403 | End: 2023-07-25
Attending: SURGERY | Admitting: SURGERY
Payer: MEDICARE

## 2023-07-24 DIAGNOSIS — E11.9 DIABETES MELLITUS (HCC): ICD-10-CM

## 2023-07-24 DIAGNOSIS — E11.9 TYPE 2 DIABETES MELLITUS WITHOUT COMPLICATION, WITHOUT LONG-TERM CURRENT USE OF INSULIN (HCC): ICD-10-CM

## 2023-07-24 DIAGNOSIS — E66.01 CLASS 3 SEVERE OBESITY DUE TO EXCESS CALORIES WITH SERIOUS COMORBIDITY AND BODY MASS INDEX (BMI) OF 45.0 TO 49.9 IN ADULT (HCC): Primary | ICD-10-CM

## 2023-07-24 DIAGNOSIS — E66.01 MORBID OBESITY (HCC): ICD-10-CM

## 2023-07-24 DIAGNOSIS — G47.33 OBSTRUCTIVE SLEEP APNEA: ICD-10-CM

## 2023-07-24 DIAGNOSIS — K21.9 GASTROESOPHAGEAL REFLUX DISEASE: ICD-10-CM

## 2023-07-24 DIAGNOSIS — I10 HYPERTENSION: ICD-10-CM

## 2023-07-24 DIAGNOSIS — I10 PRIMARY HYPERTENSION: ICD-10-CM

## 2023-07-24 LAB
EXT PREGNANCY TEST URINE: NEGATIVE
EXT. CONTROL: NORMAL
GLUCOSE SERPL-MCNC: 147 MG/DL (ref 65–140)
GLUCOSE SERPL-MCNC: 161 MG/DL (ref 65–140)
GLUCOSE SERPL-MCNC: 184 MG/DL (ref 65–140)
GLUCOSE SERPL-MCNC: 210 MG/DL (ref 65–140)

## 2023-07-24 PROCEDURE — 0DB64Z3 EXCISION OF STOMACH, PERCUTANEOUS ENDOSCOPIC APPROACH, VERTICAL: ICD-10-PCS | Performed by: SURGERY

## 2023-07-24 PROCEDURE — 81025 URINE PREGNANCY TEST: CPT | Performed by: ANESTHESIOLOGY

## 2023-07-24 PROCEDURE — 43775 LAP SLEEVE GASTRECTOMY: CPT | Performed by: STUDENT IN AN ORGANIZED HEALTH CARE EDUCATION/TRAINING PROGRAM

## 2023-07-24 PROCEDURE — C9290 INJ, BUPIVACAINE LIPOSOME: HCPCS | Performed by: PHYSICIAN ASSISTANT

## 2023-07-24 PROCEDURE — 88307 TISSUE EXAM BY PATHOLOGIST: CPT | Performed by: PATHOLOGY

## 2023-07-24 PROCEDURE — 0DJ08ZZ INSPECTION OF UPPER INTESTINAL TRACT, VIA NATURAL OR ARTIFICIAL OPENING ENDOSCOPIC: ICD-10-PCS | Performed by: SURGERY

## 2023-07-24 PROCEDURE — 8E0W4CZ ROBOTIC ASSISTED PROCEDURE OF TRUNK REGION, PERCUTANEOUS ENDOSCOPIC APPROACH: ICD-10-PCS | Performed by: SURGERY

## 2023-07-24 PROCEDURE — 99243 OFF/OP CNSLTJ NEW/EST LOW 30: CPT | Performed by: PHYSICIAN ASSISTANT

## 2023-07-24 PROCEDURE — S2900 ROBOTIC SURGICAL SYSTEM: HCPCS | Performed by: SURGERY

## 2023-07-24 PROCEDURE — 82948 REAGENT STRIP/BLOOD GLUCOSE: CPT

## 2023-07-24 PROCEDURE — 43775 LAP SLEEVE GASTRECTOMY: CPT | Performed by: SURGERY

## 2023-07-24 RX ORDER — ENOXAPARIN SODIUM 100 MG/ML
40 INJECTION SUBCUTANEOUS EVERY 24 HOURS
Status: DISCONTINUED | OUTPATIENT
Start: 2023-07-25 | End: 2023-07-25 | Stop reason: HOSPADM

## 2023-07-24 RX ORDER — SCOLOPAMINE TRANSDERMAL SYSTEM 1 MG/1
1 PATCH, EXTENDED RELEASE TRANSDERMAL ONCE
Status: DISCONTINUED | OUTPATIENT
Start: 2023-07-24 | End: 2023-07-25 | Stop reason: HOSPADM

## 2023-07-24 RX ORDER — LIDOCAINE HYDROCHLORIDE 20 MG/ML
INJECTION, SOLUTION EPIDURAL; INFILTRATION; INTRACAUDAL; PERINEURAL AS NEEDED
Status: DISCONTINUED | OUTPATIENT
Start: 2023-07-24 | End: 2023-07-24

## 2023-07-24 RX ORDER — OXYCODONE HCL 5 MG/5 ML
10 SOLUTION, ORAL ORAL EVERY 4 HOURS PRN
Status: DISCONTINUED | OUTPATIENT
Start: 2023-07-24 | End: 2023-07-25 | Stop reason: HOSPADM

## 2023-07-24 RX ORDER — HYDROMORPHONE HCL/PF 1 MG/ML
0.5 SYRINGE (ML) INJECTION
Status: DISCONTINUED | OUTPATIENT
Start: 2023-07-24 | End: 2023-07-24 | Stop reason: HOSPADM

## 2023-07-24 RX ORDER — INSULIN LISPRO 100 [IU]/ML
2-12 INJECTION, SOLUTION INTRAVENOUS; SUBCUTANEOUS
Status: DISCONTINUED | OUTPATIENT
Start: 2023-07-24 | End: 2023-07-25 | Stop reason: HOSPADM

## 2023-07-24 RX ORDER — ONDANSETRON 2 MG/ML
INJECTION INTRAMUSCULAR; INTRAVENOUS AS NEEDED
Status: DISCONTINUED | OUTPATIENT
Start: 2023-07-24 | End: 2023-07-24

## 2023-07-24 RX ORDER — PROMETHAZINE HYDROCHLORIDE 25 MG/ML
6.25 INJECTION, SOLUTION INTRAMUSCULAR; INTRAVENOUS ONCE AS NEEDED
Status: DISCONTINUED | OUTPATIENT
Start: 2023-07-24 | End: 2023-07-24 | Stop reason: HOSPADM

## 2023-07-24 RX ORDER — ENOXAPARIN SODIUM 100 MG/ML
40 INJECTION SUBCUTANEOUS
Status: COMPLETED | OUTPATIENT
Start: 2023-07-24 | End: 2023-07-24

## 2023-07-24 RX ORDER — MORPHINE SULFATE 4 MG/ML
4 INJECTION, SOLUTION INTRAMUSCULAR; INTRAVENOUS EVERY 4 HOURS PRN
Status: DISCONTINUED | OUTPATIENT
Start: 2023-07-24 | End: 2023-07-25 | Stop reason: HOSPADM

## 2023-07-24 RX ORDER — DEXAMETHASONE SODIUM PHOSPHATE 10 MG/ML
INJECTION, SOLUTION INTRAMUSCULAR; INTRAVENOUS AS NEEDED
Status: DISCONTINUED | OUTPATIENT
Start: 2023-07-24 | End: 2023-07-24

## 2023-07-24 RX ORDER — ACETAMINOPHEN 160 MG/5ML
975 SUSPENSION ORAL EVERY 8 HOURS
Status: DISCONTINUED | OUTPATIENT
Start: 2023-07-24 | End: 2023-07-25 | Stop reason: HOSPADM

## 2023-07-24 RX ORDER — CELECOXIB 200 MG/1
200 CAPSULE ORAL ONCE
Status: COMPLETED | OUTPATIENT
Start: 2023-07-24 | End: 2023-07-24

## 2023-07-24 RX ORDER — FENTANYL CITRATE/PF 50 MCG/ML
50 SYRINGE (ML) INJECTION
Status: COMPLETED | OUTPATIENT
Start: 2023-07-24 | End: 2023-07-24

## 2023-07-24 RX ORDER — SIMETHICONE 80 MG
80 TABLET,CHEWABLE ORAL 4 TIMES DAILY PRN
Status: DISCONTINUED | OUTPATIENT
Start: 2023-07-24 | End: 2023-07-25 | Stop reason: HOSPADM

## 2023-07-24 RX ORDER — CEFAZOLIN SODIUM 2 G/50ML
2000 SOLUTION INTRAVENOUS ONCE
Status: COMPLETED | OUTPATIENT
Start: 2023-07-24 | End: 2023-07-24

## 2023-07-24 RX ORDER — ONDANSETRON 2 MG/ML
4 INJECTION INTRAMUSCULAR; INTRAVENOUS EVERY 6 HOURS PRN
Status: DISCONTINUED | OUTPATIENT
Start: 2023-07-24 | End: 2023-07-25 | Stop reason: HOSPADM

## 2023-07-24 RX ORDER — MEPERIDINE HYDROCHLORIDE 25 MG/ML
12.5 INJECTION INTRAMUSCULAR; INTRAVENOUS; SUBCUTANEOUS ONCE AS NEEDED
Status: DISCONTINUED | OUTPATIENT
Start: 2023-07-24 | End: 2023-07-24 | Stop reason: HOSPADM

## 2023-07-24 RX ORDER — MIDAZOLAM HYDROCHLORIDE 2 MG/2ML
INJECTION, SOLUTION INTRAMUSCULAR; INTRAVENOUS AS NEEDED
Status: DISCONTINUED | OUTPATIENT
Start: 2023-07-24 | End: 2023-07-24

## 2023-07-24 RX ORDER — MAGNESIUM HYDROXIDE 1200 MG/15ML
LIQUID ORAL AS NEEDED
Status: DISCONTINUED | OUTPATIENT
Start: 2023-07-24 | End: 2023-07-24 | Stop reason: HOSPADM

## 2023-07-24 RX ORDER — METOCLOPRAMIDE HYDROCHLORIDE 5 MG/ML
10 INJECTION INTRAMUSCULAR; INTRAVENOUS EVERY 6 HOURS PRN
Status: DISCONTINUED | OUTPATIENT
Start: 2023-07-24 | End: 2023-07-25 | Stop reason: HOSPADM

## 2023-07-24 RX ORDER — PROMETHAZINE HYDROCHLORIDE 25 MG/ML
25 INJECTION, SOLUTION INTRAMUSCULAR; INTRAVENOUS EVERY 6 HOURS PRN
Status: DISCONTINUED | OUTPATIENT
Start: 2023-07-24 | End: 2023-07-25 | Stop reason: HOSPADM

## 2023-07-24 RX ORDER — OXYCODONE HCL 5 MG/5 ML
5 SOLUTION, ORAL ORAL EVERY 4 HOURS PRN
Status: DISCONTINUED | OUTPATIENT
Start: 2023-07-24 | End: 2023-07-25 | Stop reason: HOSPADM

## 2023-07-24 RX ORDER — SODIUM CHLORIDE, SODIUM LACTATE, POTASSIUM CHLORIDE, CALCIUM CHLORIDE 600; 310; 30; 20 MG/100ML; MG/100ML; MG/100ML; MG/100ML
100 INJECTION, SOLUTION INTRAVENOUS CONTINUOUS
Status: DISCONTINUED | OUTPATIENT
Start: 2023-07-24 | End: 2023-07-25 | Stop reason: HOSPADM

## 2023-07-24 RX ORDER — ONDANSETRON 2 MG/ML
4 INJECTION INTRAMUSCULAR; INTRAVENOUS ONCE AS NEEDED
Status: DISCONTINUED | OUTPATIENT
Start: 2023-07-24 | End: 2023-07-24 | Stop reason: HOSPADM

## 2023-07-24 RX ORDER — ROCURONIUM BROMIDE 10 MG/ML
INJECTION, SOLUTION INTRAVENOUS AS NEEDED
Status: DISCONTINUED | OUTPATIENT
Start: 2023-07-24 | End: 2023-07-24

## 2023-07-24 RX ORDER — PROPOFOL 10 MG/ML
INJECTION, EMULSION INTRAVENOUS AS NEEDED
Status: DISCONTINUED | OUTPATIENT
Start: 2023-07-24 | End: 2023-07-24

## 2023-07-24 RX ORDER — ACETAMINOPHEN 325 MG/1
975 TABLET ORAL EVERY 8 HOURS
Status: DISCONTINUED | OUTPATIENT
Start: 2023-07-24 | End: 2023-07-25 | Stop reason: HOSPADM

## 2023-07-24 RX ORDER — DIPHENHYDRAMINE HCL 25 MG
25 TABLET ORAL EVERY 8 HOURS PRN
Status: DISCONTINUED | OUTPATIENT
Start: 2023-07-24 | End: 2023-07-25 | Stop reason: HOSPADM

## 2023-07-24 RX ORDER — FAMOTIDINE 10 MG/ML
20 INJECTION, SOLUTION INTRAVENOUS 2 TIMES DAILY
Status: DISCONTINUED | OUTPATIENT
Start: 2023-07-24 | End: 2023-07-25 | Stop reason: HOSPADM

## 2023-07-24 RX ORDER — BUPIVACAINE HYDROCHLORIDE 5 MG/ML
INJECTION, SOLUTION EPIDURAL; INTRACAUDAL AS NEEDED
Status: DISCONTINUED | OUTPATIENT
Start: 2023-07-24 | End: 2023-07-24 | Stop reason: HOSPADM

## 2023-07-24 RX ORDER — FENTANYL CITRATE 50 UG/ML
INJECTION, SOLUTION INTRAMUSCULAR; INTRAVENOUS AS NEEDED
Status: DISCONTINUED | OUTPATIENT
Start: 2023-07-24 | End: 2023-07-24

## 2023-07-24 RX ORDER — DEXMEDETOMIDINE HYDROCHLORIDE 100 UG/ML
INJECTION, SOLUTION INTRAVENOUS AS NEEDED
Status: DISCONTINUED | OUTPATIENT
Start: 2023-07-24 | End: 2023-07-24

## 2023-07-24 RX ORDER — ACETAMINOPHEN 325 MG/1
975 TABLET ORAL ONCE
Status: COMPLETED | OUTPATIENT
Start: 2023-07-24 | End: 2023-07-24

## 2023-07-24 RX ORDER — SODIUM CHLORIDE 9 MG/ML
INJECTION INTRAVENOUS AS NEEDED
Status: DISCONTINUED | OUTPATIENT
Start: 2023-07-24 | End: 2023-07-24 | Stop reason: HOSPADM

## 2023-07-24 RX ORDER — SODIUM CHLORIDE 9 MG/ML
125 INJECTION, SOLUTION INTRAVENOUS CONTINUOUS
Status: DISCONTINUED | OUTPATIENT
Start: 2023-07-24 | End: 2023-07-25 | Stop reason: HOSPADM

## 2023-07-24 RX ADMIN — OXYCODONE HYDROCHLORIDE 5 MG: 5 SOLUTION ORAL at 20:23

## 2023-07-24 RX ADMIN — SODIUM CHLORIDE: 0.9 INJECTION, SOLUTION INTRAVENOUS at 07:56

## 2023-07-24 RX ADMIN — ONDANSETRON 4 MG: 2 INJECTION INTRAMUSCULAR; INTRAVENOUS at 08:42

## 2023-07-24 RX ADMIN — FAMOTIDINE 20 MG: 10 INJECTION INTRAVENOUS at 20:23

## 2023-07-24 RX ADMIN — FENTANYL CITRATE 50 MCG: 50 INJECTION INTRAMUSCULAR; INTRAVENOUS at 07:56

## 2023-07-24 RX ADMIN — SUGAMMADEX 400 MG: 100 INJECTION, SOLUTION INTRAVENOUS at 08:56

## 2023-07-24 RX ADMIN — ACETAMINOPHEN 325MG 975 MG: 325 TABLET ORAL at 23:14

## 2023-07-24 RX ADMIN — CELECOXIB 200 MG: 200 CAPSULE ORAL at 05:40

## 2023-07-24 RX ADMIN — DEXMEDETOMIDINE HCL 4 MCG: 100 INJECTION INTRAVENOUS at 08:48

## 2023-07-24 RX ADMIN — SCOPALAMINE 1 PATCH: 1 PATCH, EXTENDED RELEASE TRANSDERMAL at 05:40

## 2023-07-24 RX ADMIN — FENTANYL CITRATE 50 MCG: 50 INJECTION INTRAMUSCULAR; INTRAVENOUS at 07:43

## 2023-07-24 RX ADMIN — SODIUM CHLORIDE, SODIUM LACTATE, POTASSIUM CHLORIDE, AND CALCIUM CHLORIDE 100 ML/HR: .6; .31; .03; .02 INJECTION, SOLUTION INTRAVENOUS at 10:28

## 2023-07-24 RX ADMIN — CEFAZOLIN SODIUM 2000 MG: 2 SOLUTION INTRAVENOUS at 07:29

## 2023-07-24 RX ADMIN — PROPOFOL 200 MG: 10 INJECTION, EMULSION INTRAVENOUS at 07:33

## 2023-07-24 RX ADMIN — DEXAMETHASONE SODIUM PHOSPHATE 5 MG: 10 INJECTION INTRAMUSCULAR; INTRAVENOUS at 07:33

## 2023-07-24 RX ADMIN — OXYCODONE HYDROCHLORIDE 10 MG: 5 SOLUTION ORAL at 11:36

## 2023-07-24 RX ADMIN — ONDANSETRON 4 MG: 2 INJECTION INTRAMUSCULAR; INTRAVENOUS at 19:44

## 2023-07-24 RX ADMIN — DEXMEDETOMIDINE HCL 8 MCG: 100 INJECTION INTRAVENOUS at 07:56

## 2023-07-24 RX ADMIN — SODIUM CHLORIDE: 0.9 INJECTION, SOLUTION INTRAVENOUS at 08:52

## 2023-07-24 RX ADMIN — PHENYLEPHRINE HYDROCHLORIDE 50 MCG/MIN: 10 INJECTION INTRAVENOUS at 08:35

## 2023-07-24 RX ADMIN — FENTANYL CITRATE 50 MCG: 50 INJECTION INTRAMUSCULAR; INTRAVENOUS at 09:32

## 2023-07-24 RX ADMIN — FENTANYL CITRATE 50 MCG: 50 INJECTION INTRAMUSCULAR; INTRAVENOUS at 09:42

## 2023-07-24 RX ADMIN — LIDOCAINE HYDROCHLORIDE 100 MG: 20 INJECTION, SOLUTION EPIDURAL; INFILTRATION; INTRACAUDAL at 07:33

## 2023-07-24 RX ADMIN — SUGAMMADEX 100 MG: 100 INJECTION, SOLUTION INTRAVENOUS at 08:58

## 2023-07-24 RX ADMIN — INSULIN LISPRO 4 UNITS: 100 INJECTION, SOLUTION INTRAVENOUS; SUBCUTANEOUS at 17:10

## 2023-07-24 RX ADMIN — ROCURONIUM BROMIDE 10 MG: 10 INJECTION, SOLUTION INTRAVENOUS at 08:26

## 2023-07-24 RX ADMIN — MIDAZOLAM 2 MG: 1 INJECTION INTRAMUSCULAR; INTRAVENOUS at 07:24

## 2023-07-24 RX ADMIN — FENTANYL CITRATE 50 MCG: 50 INJECTION INTRAMUSCULAR; INTRAVENOUS at 10:07

## 2023-07-24 RX ADMIN — SODIUM CHLORIDE 125 ML/HR: 0.9 INJECTION, SOLUTION INTRAVENOUS at 05:53

## 2023-07-24 RX ADMIN — FENTANYL CITRATE 100 MCG: 50 INJECTION INTRAMUSCULAR; INTRAVENOUS at 07:33

## 2023-07-24 RX ADMIN — ROCURONIUM BROMIDE 60 MG: 10 INJECTION, SOLUTION INTRAVENOUS at 07:33

## 2023-07-24 RX ADMIN — ACETAMINOPHEN 325MG 975 MG: 325 TABLET ORAL at 05:40

## 2023-07-24 RX ADMIN — FENTANYL CITRATE 50 MCG: 50 INJECTION INTRAMUSCULAR; INTRAVENOUS at 09:19

## 2023-07-24 RX ADMIN — ENOXAPARIN SODIUM 40 MG: 40 INJECTION SUBCUTANEOUS at 05:40

## 2023-07-24 NOTE — CONSULTS
[facility]  Consult  Name: Keily Sandhu 43 y.o. female I MRN: [id number]  Unit/Bed#: E5 -01 I Date of Admission: 7/24/2023   Date of Service: 7/24/2023 I Hospital Day: 0    Inpatient consult to Internal Medicine  Consult performed by: Esteban Johns PA-C  Consult ordered by: Micheal Mccray PA-C          Assessment/Plan   Class 3 severe obesity due to excess calories with serious comorbidity and body mass index (BMI) of 45.0 to 49.9 in adult [facility])  Assessment & Plan  Postop day 0 following laparoscopic sleeve gastrectomy  · Management per primary    MARIA LUISA (obstructive sleep apnea)  Assessment & Plan  Continue CPAP    Hypertension  Assessment & Plan  Maintained on lisinopril 10 mg daily  · Hold while inpatient, repeat labs in a.m. · Continue to monitor blood pressure    Gastroesophageal reflux disease  Assessment & Plan  Continue Pepcid    Type 2 diabetes mellitus without complication, without long-term current use of insulin [facility])  Assessment & Plan  Lab Results   Component Value Date    HGBA1C 6.6 (H) 05/12/2023       Recent Labs     07/24/23  0551 07/24/23  0914   POCGLU 184* 161*       Blood Sugar Average: Last 72 hrs:  (P) 172.5   On metformin and Trulicity as an outpatient  · Discontinue metformin  · Continue Trulicity  · Sliding scale insulin ACHS while inpatient            VTE Prophylaxis:   Moderate Risk (Score 3-4) - Pharmacological DVT Prophylaxis Ordered: enoxaparin (Lovenox).     Recommendations for Discharge:  · Discontinue metformin  · Continue lisinopril, Trulicity    Total Time Spent on Date of Encounter in care of patient: 45 minutes This time was spent on one or more of the following: performing physical exam; counseling and coordination of care; obtaining or reviewing history; documenting in the medical record; reviewing/ordering tests, medications or procedures; communicating with other healthcare professionals and discussing with patient's family/caregivers. Collaboration of Care: Were Recommendations Directly Discussed with Primary Treatment Team? No    History of Present Illness:  Glenn Collier is a 43 y.o. female who is originally admitted to the bariatric's service due to sleeve gastrectomy. We are consulted for medication management. The patient has a past medical history of type 2 diabetes, GERD, hypertension, and MARIA LUISA. She takes all of her medications as prescribed. She denies any illicit drug use, alcohol abuse, or tobacco abuse. Denies any surgical history, denies any family history of cardiac disease. Is compliant with her CPAP which she has at bedside. Review of Systems:  Review of Systems   Constitutional: Negative for appetite change, chills, fever and unexpected weight change. HENT: Negative for hearing loss, rhinorrhea and sore throat. Eyes: Negative for pain, redness and visual disturbance. Respiratory: Negative for cough, chest tightness and shortness of breath. Cardiovascular: Negative for chest pain, palpitations and leg swelling. Gastrointestinal: Positive for abdominal pain and nausea. Negative for constipation, diarrhea and vomiting. Endocrine: Negative for polydipsia, polyphagia and polyuria. Genitourinary: Negative for dysuria, frequency and urgency. Neurological: Negative for dizziness, light-headedness and headaches.        Past Medical and Surgical History:   Past Medical History:   Diagnosis Date   • Diabetes mellitus ([address])    • Hypertension    • Obesity    • Periodontal disease    • Risk factors for obstructive sleep apnea 08/08/2022   • Seasonal allergies        Past Surgical History:   Procedure Laterality Date   • CATARACT EXTRACTION Left 06/08/2022   • CATARACT EXTRACTION Right 11/10/2021   • COLONOSCOPY  2013       Meds/Allergies:  all medications and allergies reviewed    Allergies: No Known Allergies    Social History:  Marital Status: Single  Substance Use History:   Social History Substance and Sexual Activity   Alcohol Use Never     Social History     Tobacco Use   Smoking Status Never   Smokeless Tobacco Never     Social History     Substance and Sexual Activity   Drug Use Never       Family History:  Family History   Problem Relation Age of Onset   • Obesity Mother    • Sleep apnea Neg Hx        Physical Exam:   Vitals:   Blood Pressure: 135/87 (07/24/23 1500)  Pulse: 99 (07/24/23 1500)  Temperature: 98.1 °F (36.7 °C) (07/24/23 1500)  Temp Source: Oral (07/24/23 1500)  Respirations: 16 (07/24/23 1500)  Weight - Scale: 113 kg (249 lb 9 oz) (07/24/23 0558)  SpO2: 93 % (07/24/23 1043)    Physical Exam  Vitals and nursing note reviewed. Constitutional:       General: She is not in acute distress. Appearance: Normal appearance. She is obese. She is not ill-appearing, toxic-appearing or diaphoretic. HENT:      Head: Normocephalic and atraumatic. Cardiovascular:      Rate and Rhythm: Normal rate and regular rhythm. Heart sounds: No murmur heard. No friction rub. No gallop. Pulmonary:      Effort: Pulmonary effort is normal. No respiratory distress. Breath sounds: Normal breath sounds. No wheezing, rhonchi or rales. Musculoskeletal:      Right lower leg: No edema. Left lower leg: No edema. Skin:     General: Skin is warm and dry. Coloration: Skin is not jaundiced or pale. Neurological:      General: No focal deficit present. Mental Status: She is alert. Mental status is at baseline. Additional Data:   Lab Results:                    Lab Results   Component Value Date/Time    HGBA1C 6.6 (H) 05/12/2023 01:15 PM    HGBA1C 7.9 (H) 12/19/2022 09:13 AM    HGBA1C 8.0 (A) 11/21/2022 02:58 PM    HGBA1C 8.2 (H) 08/22/2022 02:15 PM    HGBA1C 8.0 (A) 08/22/2022 02:14 PM     Results from last 7 days   Lab Units 07/24/23  0914 07/24/23  0551   POC GLUCOSE mg/dl 161* 184*           Imaging: No pertinent imaging reviewed.   No orders to display       EKG, Pathology, and Other Studies Reviewed on Admission:   · EKG: No EKG obtained. ** Please Note: This note may have been constructed using a voice recognition system.  **

## 2023-07-24 NOTE — ASSESSMENT & PLAN NOTE
Lab Results   Component Value Date    HGBA1C 6.6 (H) 05/12/2023       Recent Labs     07/24/23  0551 07/24/23  0914   POCGLU 184* 161*       Blood Sugar Average: Last 72 hrs:  (P) 172.5   On metformin and Trulicity as an outpatient  · Discontinue metformin  · Continue Trulicity  · Sliding scale insulin ACHS while inpatient

## 2023-07-24 NOTE — ANESTHESIA POSTPROCEDURE EVALUATION
Post-Op Assessment Note    CV Status:  Stable    Pain management: adequate     Mental Status:  Alert and awake   Hydration Status:  Euvolemic   PONV Controlled:  Controlled   Airway Patency:  Patent      Post Op Vitals Reviewed: Yes      Staff: Anesthesiologist, CRNA         No notable events documented.     BP      Temp      Pulse     Resp      SpO2      /76 (BP Location: Right arm)   Pulse 85   Temp 97.9 °F (36.6 °C) (Axillary)   Resp 18   Wt 113 kg (249 lb 9 oz)   LMP 07/23/2023   SpO2 93%   BMI 47.15 kg/m²

## 2023-07-24 NOTE — ASSESSMENT & PLAN NOTE
Maintained on lisinopril 10 mg daily  · Hold while inpatient, repeat labs in a.m.   · Continue to monitor blood pressure

## 2023-07-24 NOTE — PLAN OF CARE
"Subjective   Marina Diaz is a 47 y.o. female here for acute low back pain.  She has had this back pain off and on throughout the years, but it generally does not last this long when it flares.  She has pain in the lower lumbar area that radiates to each side and she has tenderness in her bilateral buttocks.  She denies any new exercise or inciting event that she can remember.  The pain is worse in the morning, associated with stiffness, stiff and moderate in severity, gets better throughout the day.  She did get a new mattress several years ago when she was having this problem.  She does not normally take medications for this.  She denies any numbness or tingling in her legs.  She does endorse some right-sided sciatica.  She is just completed physical therapy for her right foot about 2 weeks ago.    I have reviewed the following portions of the patient's history and confirmed they are accurate: current medications, past medical history, past surgical history and problem list     I have personally completed the patient's review of systems.    Review of Systems:  General: negative  Skin: Negative  Neuro: negative  MSK: Low back pain    Objective   /68 (BP Location: Left arm, Patient Position: Sitting, Cuff Size: Adult)   Temp 97.8 °F (36.6 °C) (Temporal)   Ht 167.6 cm (66\")   Wt 78.9 kg (174 lb)   BMI 28.08 kg/m²     Physical Exam   Constitutional: She is oriented to person, place, and time. She appears well-developed and well-nourished.   Pulmonary/Chest: Effort normal.   Musculoskeletal:        Lumbar back: She exhibits tenderness and bony tenderness. She exhibits normal range of motion, no edema and no deformity.   Tenderness to palpation lumbar area and along the iliac crests bilaterally   Neurological: She is alert and oriented to person, place, and time.   Skin: Skin is warm and dry.   Psychiatric: She has a normal mood and affect. Her behavior is normal. Judgment and thought content " Problem: Nutrition/Hydration-ADULT  Goal: Nutrient/Hydration intake appropriate for improving, restoring or maintaining nutritional needs  Description: Monitor and assess patient's nutrition/hydration status for malnutrition. Collaborate with interdisciplinary team and initiate plan and interventions as ordered. Monitor patient's weight and dietary intake as ordered or per policy. Utilize nutrition screening tool and intervene as necessary. Determine patient's food preferences and provide high-protein, high-caloric foods as appropriate.      INTERVENTIONS:  - Monitor oral intake, urinary output, labs, and treatment plans  - Assess nutrition and hydration status and recommend course of action  - Evaluate amount of meals eaten  - Assist patient with eating if necessary   - Allow adequate time for meals  - Recommend/ encourage appropriate diets, oral nutritional supplements, and vitamin/mineral supplements  - Order, calculate, and assess calorie counts as needed  - Recommend, monitor, and adjust tube feedings and TPN/PPN based on assessed needs  - Assess need for intravenous fluids  - Provide specific nutrition/hydration education as appropriate  - Include patient/family/caregiver in decisions related to nutrition  Outcome: Progressing     Problem: RESPIRATORY - ADULT  Goal: Achieves optimal ventilation and oxygenation  Description: INTERVENTIONS:  - Assess for changes in respiratory status  - Assess for changes in mentation and behavior  - Position to facilitate oxygenation and minimize respiratory effort  - Oxygen administered by appropriate delivery if ordered  - Initiate smoking cessation education as indicated  - Encourage broncho-pulmonary hygiene including cough, deep breathe, Incentive Spirometry  - Assess the need for suctioning and aspirate as needed  - Assess and instruct to report SOB or any respiratory difficulty  - Respiratory Therapy support as indicated  Outcome: Progressing     Problem: GASTROINTESTINAL - ADULT  Goal: Minimal or absence of nausea and/or vomiting  Description: INTERVENTIONS:  - Administer IV fluids if ordered to ensure adequate hydration  - Maintain NPO status until nausea and vomiting are resolved  - Nasogastric tube if ordered  - Administer ordered antiemetic medications as needed  - Provide nonpharmacologic comfort measures as appropriate  - Advance diet as tolerated, if ordered  - Consider nutrition services referral to assist patient with adequate nutrition and appropriate food choices  Outcome: Progressing  Goal: Maintains adequate nutritional intake  Description: INTERVENTIONS:  - Monitor percentage of each meal consumed  - Identify factors contributing to decreased intake, treat as appropriate  - Assist with meals as needed  - Monitor I&O, weight, and lab values if indicated  - Obtain nutrition services referral as needed  Outcome: Progressing     Problem: GENITOURINARY - ADULT  Goal: Maintains or returns to baseline urinary function  Description: INTERVENTIONS:  - Assess urinary function  - Encourage oral fluids to ensure adequate hydration if ordered  - Administer IV fluids as ordered to ensure adequate hydration  - Administer ordered medications as needed  - Offer frequent toileting  - Follow urinary retention protocol if ordered  Outcome: Progressing  Goal: Absence of urinary retention  Description: INTERVENTIONS:  - Assess patient’s ability to void and empty bladder  - Monitor I/O  - Bladder scan as needed  - Discuss with physician/AP medications to alleviate retention as needed  - Discuss catheterization for long term situations as appropriate  Outcome: Progressing     Problem: PAIN - ADULT  Goal: Verbalizes/displays adequate comfort level or baseline comfort level  Description: Interventions:  - Encourage patient to monitor pain and request assistance  - Assess pain using appropriate pain scale  - Administer analgesics based on type and severity of pain and evaluate normal.   Vitals reviewed.      Assessment/Plan   Marina was seen today for back pain.    Diagnoses and all orders for this visit:    Acute bilateral low back pain without sciatica  -     Ambulatory Referral to Physical Therapy Evaluate and treat  -     ketorolac (TORADOL) injection 30 mg  -     cyclobenzaprine (FLEXERIL) 10 MG tablet; Take 1 tablet by mouth 3 (Three) Times a Day As Needed for Muscle Spasms.  -Advised that she take Flexeril as well as 400 mg of ibuprofen prior to bedtime as most of her symptoms are in the morning  -New requiring treatment    Spondylolisthesis at L4-L5 level  -     Ambulatory Referral to Physical Therapy Evaluate and treat  -     ketorolac (TORADOL) injection 30 mg  -     cyclobenzaprine (FLEXERIL) 10 MG tablet; Take 1 tablet by mouth 3 (Three) Times a Day As Needed for Muscle Spasms.  -Chronic, requiring treatment             Angela Waddell MA    Please note that portions of this note were completed with a voice recognition program. Efforts were made to edit the dictations, but occasionally words are mistranscribed.   response  - Implement non-pharmacological measures as appropriate and evaluate response  - Consider cultural and social influences on pain and pain management  - Notify physician/advanced practitioner if interventions unsuccessful or patient reports new pain  Outcome: Progressing     Problem: INFECTION - ADULT  Goal: Absence or prevention of progression during hospitalization  Description: INTERVENTIONS:  - Assess and monitor for signs and symptoms of infection  - Monitor lab/diagnostic results  - Monitor all insertion sites, i.e. indwelling lines, tubes, and drains  - Monitor endotracheal if appropriate and nasal secretions for changes in amount and color  - [place] appropriate cooling/warming therapies per order  - Administer medications as ordered  - Instruct and encourage patient and family to use good hand hygiene technique  - Identify and instruct in appropriate isolation precautions for identified infection/condition  Outcome: Progressing  Goal: Absence of fever/infection during neutropenic period  Description: INTERVENTIONS:  - Monitor WBC    Outcome: Progressing     Problem: SAFETY ADULT  Goal: Patient will remain free of falls  Description: INTERVENTIONS:  - Educate patient/family on patient safety including physical limitations  - Instruct patient to call for assistance with activity   - Consult OT/PT to assist with strengthening/mobility   - Keep Call bell within reach  - Keep bed low and locked with side rails adjusted as appropriate  - Keep care items and personal belongings within reach  - Initiate and maintain comfort rounds  - Make Fall Risk Sign visible to staff  - Apply yellow socks and bracelet for high fall risk patients  - Consider moving patient to room near nurses station  Outcome: Progressing  Goal: Maintain or return to baseline ADL function  Description: INTERVENTIONS:  -  Assess patient's ability to carry out ADLs; assess patient's baseline for ADL function and identify physical deficits which impact ability to perform ADLs (bathing, care of mouth/teeth, toileting, grooming, dressing, etc.)  - Assess/evaluate cause of self-care deficits   - Assess range of motion  - Assess patient's mobility; develop plan if impaired  - Assess patient's need for assistive devices and provide as appropriate  - Encourage maximum independence but intervene and supervise when necessary  - Involve family in performance of ADLs  - Assess for home care needs following discharge   - Consider OT consult to assist with ADL evaluation and planning for discharge  - Provide patient education as appropriate  Outcome: Progressing  Goal: Maintains/Returns to pre admission functional level  Description: INTERVENTIONS:  - Perform BMAT or MOVE assessment daily.   - Set and communicate daily mobility goal to care team and patient/family/caregiver.    - Collaborate with rehabilitation services on mobility goals if consulted  - Out of bed for toileting  - Record patient progress and toleration of activity level   Outcome: Progressing     Problem: DISCHARGE PLANNING  Goal: Discharge to home or other facility with appropriate resources  Description: INTERVENTIONS:  - Identify barriers to discharge w/patient and caregiver  - Arrange for needed discharge resources and transportation as appropriate  - Identify discharge learning needs (meds, wound care, etc.)  - Arrange for interpretive services to assist at discharge as needed  - Refer to Case Management Department for coordinating discharge planning if the patient needs post-hospital services based on physician/advanced practitioner order or complex needs related to functional status, cognitive ability, or social support system  Outcome: Progressing     Problem: Knowledge Deficit  Goal: Patient/family/caregiver demonstrates understanding of disease process, treatment plan, medications, and discharge instructions  Description: Complete learning assessment and assess knowledge base.  Interventions:  - Provide teaching at level of understanding  - Provide teaching via preferred learning methods  Outcome: Progressing

## 2023-07-24 NOTE — INTERVAL H&P NOTE
H&P reviewed. After examining the patient I find no changes in the patients condition since the H&P had been written.     Vitals:    07/24/23 0558   BP: 133/85   Pulse: 92   Resp: 18   Temp: (!) 97 °F (36.1 °C)   SpO2: 92%

## 2023-07-24 NOTE — OP NOTE
OPERATIVE REPORT  PATIENT NAME: Cleopatra Gutierrez    :  1985  MRN: [id number]  Pt Location: [facility] OR ROOM 08    SURGERY DATE: 2023    Surgeon(s) and Role:     * Norm Carpenter MD - Primary     * Bucky Waller DO - Assisting     * Samia Gomes PA-C - Assisting    Preop Diagnosis:  Morbid obesity ([address]) [E66.01]  Obstructive sleep apnea [G47.33]  Diabetes mellitus ([address]) [E11.9]  Hypertension [I10]  Gastroesophageal reflux disease [K21.9]    Post-Op Diagnosis Codes:     * Morbid obesity ([address]) [E66.01]     * Obstructive sleep apnea [G47.33]     * Diabetes mellitus ([address]) [E11.9]     * Hypertension [I10]     * Gastroesophageal reflux disease [K21.9]    Procedure(s):  GASTRECTOMY SLEEVE W/ ROBOT & INTRAOPERATIVE EGD    Specimen(s):  ID Type Source Tests Collected by Time Destination   1 : PORTION OF STOMACH Tissue Stomach TISSUE EXAM Norm Carpenter MD 2023 [id number]        Estimated Blood Loss:   Minimal    Drains:  * No LDAs found *    Anesthesia Type:   General    Operative Indications: Morbid obesity ([address]) [E66.01]  Obstructive sleep apnea [G47.33]  Diabetes mellitus ([address]) [E11.9]  Hypertension [I10]  Gastroesophageal reflux disease [K21.9]      Operative Findings:  Normal Findings    Complications:   None    Procedure and Technique:  Robotic Sleeve gastrectomy   I was present for the entire procedure. Patient Disposition:  hemodynamically stable     No qualified resident was available  Assistant was necessary for instrument exchange and counter traction and assistance with stapling     Indication:   Patient suffers from morbid obesity and associated co-morbidities  and failed to achieve any meaningful or sustainable weight loss and was therefore consented to undergo a laparoscopic sleeve gastrectomy. Risks and benefits were explained to the patient, patient consented for the procedure. Procedure: The patient was brought to the operating room and placed in a supine position.  The patient received a dose of IV antibiotics and a dose of subcutaneous Lovenox prior to the procedure. The patient was induced under general endotracheal anesthesia. The abdominal wall was prepped and draped under sterile conditions in the usual fashion. The procedure was started by obtaining access to the abdominal cavity using a Veress needle to the left side of the midline around 6 inches from the xiphoid the abdominal cavity was insufflated with CO2 to a pressure of 15 mmHg. After that, the abdomen was entered with an 8 mm trocar using an Optiview trocar under direct visualization. A TAP block was performed using Exparel mixed with saline and 0.5 % Marcaine for a total of 100 ml. At that point, two 8 and 12 mm robotic trocars were placed on the right side of the abdominal wall, under direct visualization, and another 8 mm robotic trocar and 12 mm assistant port were placed on the left side of the abdominal wall, also under direct visualization. Patient was then placed in a reverse Trendelenburg position. A Jeanne retractor was placed through a small stab incision below the xiphoid and was used to retract the left lobe of the liver in a medial fashion. The robot was then brought into the surgical field and the arms docked. At that point, we turned our attention to the pylorus and using the vessel sealerl, the gastrocolic ligament was taken down starting 4 cm proximal to the pylorus, all the way up to the level of the short gastric vessels which were taken down with the vessel sealer  as well. The angle of His was also dissected and all the posterior attachments of the fundus were taken down with the vessel sealer, the spleen was kept out of harm’s way and the left genia was exposed and the stomach was completely mobilized off the left genia and rotated medially. At that point, the anesthesiologist was asked to insert a 36-[place] Bougie.   The Bougie was secured in place by the anesthesiologist.     We started transecting the stomach using a 60 mm SureForm stapler, the compression feedback of the stapler was used to guide the choice of cartridges, the 36-Uz[place] Bougie was kept in place throughout the performance of the sleeve gastrectomy. We made particular attention during the transaction of the greater curvature to retract the stomach laterally at the site of the transected vessels  to prevent corkscrewing of the gastric sleeve. We also avoided getting too close to the incisura to prevent  narrowing at the level of the incisura. The staple line was hemostatic and well formed and there was no evidence of narrowing at the incisura. The staple line was then imbricated using 2-0 V LOC suture in a running fashion while the bougie is in place. At the end, the anesthesiologist was asked to remove the Veronicaport. The surgical field was inspected one more time and appeared hemostatic. We then removed the [facility] liver retractor. The 12 mm port was extended, and then it was dilated. After that, the stomach specimen was retrieved and sent to Pathology. Sponge count was completed and was correct. The 12 mm port was then closed using #1 Vicryl in a simple fashion. All the trocars were removed under direct visualization, and the skin edges were approximated using 4-0 Monocryl in an interrupted, inverted fashion. Histoacryl was then applied to the skin incisions.       The patient was extubated and transferred to the PACU in stable condition        SIGNATURE: Harpal Palencia MD  DATE: July 24, 2023  TIME: 8:45 AM

## 2023-07-25 VITALS
TEMPERATURE: 97.7 F | SYSTOLIC BLOOD PRESSURE: 159 MMHG | WEIGHT: 249.56 LBS | RESPIRATION RATE: 17 BRPM | BODY MASS INDEX: 47.15 KG/M2 | HEART RATE: 78 BPM | DIASTOLIC BLOOD PRESSURE: 96 MMHG | OXYGEN SATURATION: 92 %

## 2023-07-25 LAB
ANION GAP SERPL CALCULATED.3IONS-SCNC: 6 MMOL/L
BUN SERPL-MCNC: 6 MG/DL (ref 5–25)
CALCIUM SERPL-MCNC: 8.7 MG/DL (ref 8.4–10.2)
CHLORIDE SERPL-SCNC: 108 MMOL/L (ref 96–108)
CO2 SERPL-SCNC: 24 MMOL/L (ref 21–32)
CREAT SERPL-MCNC: 1.02 MG/DL (ref 0.6–1.3)
ERYTHROCYTE [DISTWIDTH] IN BLOOD BY AUTOMATED COUNT: 12.5 % (ref 11.6–15.1)
GFR SERPL CREATININE-BSD FRML MDRD: 69 ML/MIN/1.73SQ M
GLUCOSE SERPL-MCNC: 106 MG/DL (ref 65–140)
GLUCOSE SERPL-MCNC: 97 MG/DL (ref 65–140)
GLUCOSE SERPL-MCNC: 97 MG/DL (ref 65–140)
HCT VFR BLD AUTO: 39.5 % (ref 34.8–46.1)
HGB BLD-MCNC: 13.4 G/DL (ref 11.5–15.4)
MCH RBC QN AUTO: 30.9 PG (ref 26.8–34.3)
MCHC RBC AUTO-ENTMCNC: 33.9 G/DL (ref 31.4–37.4)
MCV RBC AUTO: 91 FL (ref 82–98)
PLATELET # BLD AUTO: 278 THOUSANDS/UL (ref 149–390)
PMV BLD AUTO: 10.9 FL (ref 8.9–12.7)
POTASSIUM SERPL-SCNC: 3.7 MMOL/L (ref 3.5–5.3)
RBC # BLD AUTO: 4.34 MILLION/UL (ref 3.81–5.12)
SODIUM SERPL-SCNC: 138 MMOL/L (ref 135–147)
WBC # BLD AUTO: 10.2 THOUSAND/UL (ref 4.31–10.16)

## 2023-07-25 PROCEDURE — 80048 BASIC METABOLIC PNL TOTAL CA: CPT | Performed by: PHYSICIAN ASSISTANT

## 2023-07-25 PROCEDURE — 99024 POSTOP FOLLOW-UP VISIT: CPT | Performed by: SURGERY

## 2023-07-25 PROCEDURE — 99232 SBSQ HOSP IP/OBS MODERATE 35: CPT | Performed by: PHYSICIAN ASSISTANT

## 2023-07-25 PROCEDURE — 85027 COMPLETE CBC AUTOMATED: CPT | Performed by: PHYSICIAN ASSISTANT

## 2023-07-25 PROCEDURE — 82948 REAGENT STRIP/BLOOD GLUCOSE: CPT

## 2023-07-25 PROCEDURE — NC001 PR NO CHARGE: Performed by: STUDENT IN AN ORGANIZED HEALTH CARE EDUCATION/TRAINING PROGRAM

## 2023-07-25 RX ORDER — ACETAMINOPHEN 160 MG/5ML
975 SUSPENSION ORAL EVERY 8 HOURS
Qty: 638.4 ML | Refills: 0 | Status: SHIPPED | OUTPATIENT
Start: 2023-07-25 | End: 2023-08-01

## 2023-07-25 RX ADMIN — SODIUM CHLORIDE, SODIUM LACTATE, POTASSIUM CHLORIDE, AND CALCIUM CHLORIDE 100 ML/HR: .6; .31; .03; .02 INJECTION, SOLUTION INTRAVENOUS at 06:23

## 2023-07-25 RX ADMIN — ENOXAPARIN SODIUM 40 MG: 40 INJECTION SUBCUTANEOUS at 09:54

## 2023-07-25 RX ADMIN — ACETAMINOPHEN 325MG 975 MG: 325 TABLET ORAL at 05:10

## 2023-07-25 RX ADMIN — FAMOTIDINE 20 MG: 10 INJECTION INTRAVENOUS at 09:54

## 2023-07-25 NOTE — PLAN OF CARE
Problem: Nutrition/Hydration-ADULT  Goal: Nutrient/Hydration intake appropriate for improving, restoring or maintaining nutritional needs  Description: Monitor and assess patient's nutrition/hydration status for malnutrition. Collaborate with interdisciplinary team and initiate plan and interventions as ordered. Monitor patient's weight and dietary intake as ordered or per policy. Utilize nutrition screening tool and intervene as necessary. Determine patient's food preferences and provide high-protein, high-caloric foods as appropriate.      INTERVENTIONS:  - Monitor oral intake, urinary output, labs, and treatment plans  - Assess nutrition and hydration status and recommend course of action  - Evaluate amount of meals eaten  - Assist patient with eating if necessary   - Allow adequate time for meals  - Recommend/ encourage appropriate diets, oral nutritional supplements, and vitamin/mineral supplements  - Order, calculate, and assess calorie counts as needed  - Recommend, monitor, and adjust tube feedings and TPN/PPN based on assessed needs  - Assess need for intravenous fluids  - Provide specific nutrition/hydration education as appropriate  - Include patient/family/caregiver in decisions related to nutrition  7/25/2023 1208 by Brandon Ramires RN  Outcome: Adequate for Discharge  7/25/2023 1208 by Brandon Ramires RN  Outcome: Adequate for Discharge  7/25/2023 0800 by Brandon Ramires RN  Outcome: Progressing     Problem: RESPIRATORY - ADULT  Goal: Achieves optimal ventilation and oxygenation  Description: INTERVENTIONS:  - Assess for changes in respiratory status  - Assess for changes in mentation and behavior  - Position to facilitate oxygenation and minimize respiratory effort  - Oxygen administered by appropriate delivery if ordered  - Initiate smoking cessation education as indicated  - Encourage broncho-pulmonary hygiene including cough, deep breathe, Incentive Spirometry  - Assess the need for suctioning and aspirate as needed  - Assess and instruct to report SOB or any respiratory difficulty  - Respiratory Therapy support as indicated  7/25/2023 1208 by Liane Hendricks RN  Outcome: Adequate for Discharge  7/25/2023 1208 by Liane Hendricks RN  Outcome: Adequate for Discharge  7/25/2023 0800 by Liane Hendricks RN  Outcome: Progressing     Problem: GASTROINTESTINAL - ADULT  Goal: Minimal or absence of nausea and/or vomiting  Description: INTERVENTIONS:  - Administer IV fluids if ordered to ensure adequate hydration  - Maintain NPO status until nausea and vomiting are resolved  - Nasogastric tube if ordered  - Administer ordered antiemetic medications as needed  - Provide nonpharmacologic comfort measures as appropriate  - Advance diet as tolerated, if ordered  - Consider nutrition services referral to assist patient with adequate nutrition and appropriate food choices  7/25/2023 1208 by Liane Hendricks RN  Outcome: Adequate for Discharge  7/25/2023 1208 by Liane Hendricks RN  Outcome: Adequate for Discharge  7/25/2023 0800 by Liane Hendricks RN  Outcome: Progressing  Goal: Maintains adequate nutritional intake  Description: INTERVENTIONS:  - Monitor percentage of each meal consumed  - Identify factors contributing to decreased intake, treat as appropriate  - Assist with meals as needed  - Monitor I&O, weight, and lab values if indicated  - Obtain nutrition services referral as needed  7/25/2023 1208 by Liane Hendricks RN  Outcome: Adequate for Discharge  7/25/2023 1208 by Liane Hendricks RN  Outcome: Adequate for Discharge  7/25/2023 0800 by Liane Hendricks RN  Outcome: Progressing     Problem: GENITOURINARY - ADULT  Goal: Maintains or returns to baseline urinary function  Description: INTERVENTIONS:  - Assess urinary function  - Encourage oral fluids to ensure adequate hydration if ordered  - Administer IV fluids as ordered to ensure adequate hydration  - Administer ordered medications as needed  - Offer frequent toileting  - Follow urinary retention protocol if ordered  7/25/2023 1208 by Devorah Saeed RN  Outcome: Adequate for Discharge  7/25/2023 1208 by Devorah Saeed RN  Outcome: Adequate for Discharge  7/25/2023 0800 by Devorah Saeed RN  Outcome: Progressing  Goal: Absence of urinary retention  Description: INTERVENTIONS:  - Assess patient’s ability to void and empty bladder  - Monitor I/O  - Bladder scan as needed  - Discuss with physician/AP medications to alleviate retention as needed  - Discuss catheterization for long term situations as appropriate  7/25/2023 1208 by Devorah Saeed RN  Outcome: Adequate for Discharge  7/25/2023 1208 by Devorah Saeed RN  Outcome: Adequate for Discharge  7/25/2023 0800 by Devorah Saeed RN  Outcome: Progressing     Problem: PAIN - ADULT  Goal: Verbalizes/displays adequate comfort level or baseline comfort level  Description: Interventions:  - Encourage patient to monitor pain and request assistance  - Assess pain using appropriate pain scale  - Administer analgesics based on type and severity of pain and evaluate response  - Implement non-pharmacological measures as appropriate and evaluate response  - Consider cultural and social influences on pain and pain management  - Notify physician/advanced practitioner if interventions unsuccessful or patient reports new pain  7/25/2023 1208 by Devorah Saeed RN  Outcome: Adequate for Discharge  7/25/2023 1208 by Devorah Saeed RN  Outcome: Adequate for Discharge  7/25/2023 0800 by Devorah Saeed RN  Outcome: Progressing     Problem: INFECTION - ADULT  Goal: Absence or prevention of progression during hospitalization  Description: INTERVENTIONS:  - Assess and monitor for signs and symptoms of infection  - Monitor lab/diagnostic results  - Monitor all insertion sites, i.e. indwelling lines, tubes, and drains  - Monitor endotracheal if appropriate and nasal secretions for changes in amount and color  - [place] appropriate cooling/warming therapies per order  - Administer medications as ordered  - Instruct and encourage patient and family to use good hand hygiene technique  - Identify and instruct in appropriate isolation precautions for identified infection/condition  7/25/2023 1208 by Jono Parks RN  Outcome: Adequate for Discharge  7/25/2023 1208 by Jono Parks RN  Outcome: Adequate for Discharge  7/25/2023 0800 by Jono Parks RN  Outcome: Progressing  Goal: Absence of fever/infection during neutropenic period  Description: INTERVENTIONS:  - Monitor WBC    7/25/2023 1208 by Jono Parks RN  Outcome: Adequate for Discharge  7/25/2023 1208 by Jono Parks RN  Outcome: Adequate for Discharge  7/25/2023 0800 by Jono Parks RN  Outcome: Progressing     Problem: SAFETY ADULT  Goal: Patient will remain free of falls  Description: INTERVENTIONS:  - Educate patient/family on patient safety including physical limitations  - Instruct patient to call for assistance with activity   - Consult OT/PT to assist with strengthening/mobility   - Keep Call bell within reach  - Keep bed low and locked with side rails adjusted as appropriate  - Keep care items and personal belongings within reach  - Initiate and maintain comfort rounds  - Make Fall Risk Sign visible to staff  - Apply yellow socks and bracelet for high fall risk patients  - Consider moving patient to room near nurses station  7/25/2023 1208 by Jono Parks RN  Outcome: Adequate for Discharge  7/25/2023 1208 by Jono Parks RN  Outcome: Adequate for Discharge  7/25/2023 0800 by Jono Parks RN  Outcome: Progressing  Goal: Maintain or return to baseline ADL function  Description: INTERVENTIONS:  -  Assess patient's ability to carry out ADLs; assess patient's baseline for ADL function and identify physical deficits which impact ability to perform ADLs (bathing, care of mouth/teeth, toileting, grooming, dressing, etc.)  - Assess/evaluate cause of self-care deficits   - Assess range of motion  - Assess patient's mobility; develop plan if impaired  - Assess patient's need for assistive devices and provide as appropriate  - Encourage maximum independence but intervene and supervise when necessary  - Involve family in performance of ADLs  - Assess for home care needs following discharge   - Consider OT consult to assist with ADL evaluation and planning for discharge  - Provide patient education as appropriate  7/25/2023 1208 by Cleophas Libman, RN  Outcome: Adequate for Discharge  7/25/2023 1208 by Cleophas Libman, RN  Outcome: Adequate for Discharge  7/25/2023 0800 by Cleophas Libman, RN  Outcome: Progressing  Goal: Maintains/Returns to pre admission functional level  Description: INTERVENTIONS:  - Perform BMAT or MOVE assessment daily.   - Set and communicate daily mobility goal to care team and patient/family/caregiver.    - Collaborate with rehabilitation services on mobility goals if consulted  - Out of bed for toileting  - Record patient progress and toleration of activity level   7/25/2023 1208 by Cleophas Libman, RN  Outcome: Adequate for Discharge  7/25/2023 1208 by Cleophas Libman, RN  Outcome: Adequate for Discharge  7/25/2023 0800 by Cleophas Libman, RN  Outcome: Progressing     Problem: DISCHARGE PLANNING  Goal: Discharge to home or other facility with appropriate resources  Description: INTERVENTIONS:  - Identify barriers to discharge w/patient and caregiver  - Arrange for needed discharge resources and transportation as appropriate  - Identify discharge learning needs (meds, wound care, etc.)  - Arrange for interpretive services to assist at discharge as needed  - Refer to Case Management Department for coordinating discharge planning if the patient needs post-hospital services based on physician/advanced practitioner order or complex needs related to functional status, cognitive ability, or social support system  7/25/2023 1208 by Cezar River RN  Outcome: Adequate for Discharge  7/25/2023 1208 by Cezar River RN  Outcome: Adequate for Discharge  7/25/2023 0800 by Cezar River RN  Outcome: Progressing     Problem: Knowledge Deficit  Goal: Patient/family/caregiver demonstrates understanding of disease process, treatment plan, medications, and discharge instructions  Description: Complete learning assessment and assess knowledge base.   Interventions:  - Provide teaching at level of understanding  - Provide teaching via preferred learning methods  7/25/2023 1208 by Cezar River RN  Outcome: Adequate for Discharge  7/25/2023 1208 by Cezar River RN  Outcome: Adequate for Discharge  7/25/2023 0800 by Cezar River RN  Outcome: Progressing

## 2023-07-25 NOTE — UTILIZATION REVIEW
Initial Clinical Review    Elective inpatient  surgical procedure  Age/Sex: 43 y.o. female for morbid obesity    Anesthesia Start Date/Time: 07/24/23 0717         Procedure: GASTRECTOMY SLEEVE W/ ROBOT & INTRAOPERATIVE EGD (Abdomen)   Anesthesia type: general   Diagnosis:        Morbid obesity ([address]) [E66.01]       Obstructive sleep apnea [G47.33]       Diabetes mellitus ([address]) [E11.9]       Hypertension [I10]       Gastroesophageal reflux disease [K21.9]     Procedure(s):  GASTRECTOMY SLEEVE W/ ROBOT & INTRAOPERATIVE EGD    Estimated Blood Loss:   Minimal    Operative Findings:  Normal Findings     Complications:   None    POD#1 Progress Note:     Patient tolerated surgery well without complications. In the morning postoperative Day 1, the patient had mild nausea and abdominal pain. Tolerated a clear liquid diet without vomiting. Able to ambulate and voiding independently.  Patient was deemed ready for discharge home on lovenox        Admission Orders: Date/Time/Statement:   Admission Orders (From admission, onward)     Ordered        07/24/23 0831  Inpatient Admission  Once                      Orders Placed This Encounter   Procedures   • Inpatient Admission     Standing Status:   Standing     Number of Occurrences:   1     Order Specific Question:   Level of Care     Answer:   Med Surg [16]     Order Specific Question:   Estimated length of stay     Answer:   Inpatient Only Surgery     Vital Signs: /96 (BP Location: Left arm)   Pulse 78   Temp 97.7 °F (36.5 °C) (Temporal)   Resp 17   Wt 113 kg (249 lb 9 oz)   LMP 07/23/2023   SpO2 92%   BMI 47.15 kg/m²     Pertinent Labs/Diagnostic Test Results:   No orders to display         Results from last 7 days   Lab Units 07/25/23  0507   WBC Thousand/uL 10.20*   HEMOGLOBIN g/dL 13.4   HEMATOCRIT % 39.5   PLATELETS Thousands/uL 278         Results from last 7 days   Lab Units 07/25/23  0507   SODIUM mmol/L 138   POTASSIUM mmol/L 3.7   CHLORIDE mmol/L 108 CO2 mmol/L 24   ANION GAP mmol/L 6   BUN mg/dL 6   CREATININE mg/dL 1.02   EGFR ml/min/1.73sq m 69   CALCIUM mg/dL 8.7         Results from last 7 days   Lab Units 07/25/23  0715 07/24/23  2046 07/24/23  1623 07/24/23  0914 07/24/23  0551   POC GLUCOSE mg/dl 97 147* 210* 161* 184*     Results from last 7 days   Lab Units 07/25/23  0507   GLUCOSE RANDOM mg/dL 106       Diet: bariatric clear liquid  Mobility:  Ambulatory   DVT Prophylaxis: lovenox  Medications/Pain Control: oxycodone 1136 and 2023 and tylenol 975 q8 hr.       Scheduled Medications:  acetaminophen, 975 mg, Oral, Q8H   Or  acetaminophen, 975 mg, Oral, Q8H  enoxaparin, 40 mg, Subcutaneous, Q24H  famotidine, 20 mg, Intravenous, BID  insulin lispro, 2-12 Units, Subcutaneous, TID AC  insulin lispro, 2-12 Units, Subcutaneous, HS  scopolamine, 1 patch, Transdermal, Once      Continuous IV Infusions:  lactated ringers, 100 mL/hr, Intravenous, Continuous  sodium chloride, 125 mL/hr, Intravenous, Continuous      PRN Meds:  diphenhydrAMINE, 25 mg, Oral, Q8H PRN  metoclopramide, 10 mg, Intravenous, Q6H PRN  morphine injection, 4 mg, Intravenous, Q4H PRN  ondansetron, 4 mg, Intravenous, Q6H PRN  oxyCODONE, 10 mg, Oral, Q4H PRN  oxyCODONE, 5 mg, Oral, Q4H PRN  phenol, 2 spray, Mouth/Throat, Q2H PRN  promethazine, 25 mg, Intravenous, Q6H PRN  simethicone, 80 mg, Oral, 4x Daily PRN        Network Utilization Review Department  ATTENTION: Please call with any questions or concerns to [phone] and carefully listen to the prompts so that you are directed to the right person. All voicemails are confidential.  Hawa Sewell all requests for admission clinical reviews, approved or denied determinations and any other requests to dedicated fax number below belonging to the campus where the patient is receiving treatment.  List of dedicated fax numbers for the Facilities:  FACILITY NAME UR FAX NUMBER   ADMISSION DENIALS (Administrative/Medical Necessity) [phone]   PARENT [address] [facility] (Maternity/NICU/Pediatrics) [address] [phone] 150[address] [facility] [address] [address] [facility] [address] [facility] [place] [address] [phone]

## 2023-07-25 NOTE — PLAN OF CARE
Problem: Nutrition/Hydration-ADULT  Goal: Nutrient/Hydration intake appropriate for improving, restoring or maintaining nutritional needs  Description: Monitor and assess patient's nutrition/hydration status for malnutrition. Collaborate with interdisciplinary team and initiate plan and interventions as ordered. Monitor patient's weight and dietary intake as ordered or per policy. Utilize nutrition screening tool and intervene as necessary. Determine patient's food preferences and provide high-protein, high-caloric foods as appropriate.      INTERVENTIONS:  - Monitor oral intake, urinary output, labs, and treatment plans  - Assess nutrition and hydration status and recommend course of action  - Evaluate amount of meals eaten  - Assist patient with eating if necessary   - Allow adequate time for meals  - Recommend/ encourage appropriate diets, oral nutritional supplements, and vitamin/mineral supplements  - Order, calculate, and assess calorie counts as needed  - Recommend, monitor, and adjust tube feedings and TPN/PPN based on assessed needs  - Assess need for intravenous fluids  - Provide specific nutrition/hydration education as appropriate  - Include patient/family/caregiver in decisions related to nutrition  Outcome: Progressing     Problem: RESPIRATORY - ADULT  Goal: Achieves optimal ventilation and oxygenation  Description: INTERVENTIONS:  - Assess for changes in respiratory status  - Assess for changes in mentation and behavior  - Position to facilitate oxygenation and minimize respiratory effort  - Oxygen administered by appropriate delivery if ordered  - Initiate smoking cessation education as indicated  - Encourage broncho-pulmonary hygiene including cough, deep breathe, Incentive Spirometry  - Assess the need for suctioning and aspirate as needed  - Assess and instruct to report SOB or any respiratory difficulty  - Respiratory Therapy support as indicated  Outcome: Progressing     Problem: GASTROINTESTINAL - ADULT  Goal: Minimal or absence of nausea and/or vomiting  Description: INTERVENTIONS:  - Administer IV fluids if ordered to ensure adequate hydration  - Maintain NPO status until nausea and vomiting are resolved  - Nasogastric tube if ordered  - Administer ordered antiemetic medications as needed  - Provide nonpharmacologic comfort measures as appropriate  - Advance diet as tolerated, if ordered  - Consider nutrition services referral to assist patient with adequate nutrition and appropriate food choices  Outcome: Progressing  Goal: Maintains adequate nutritional intake  Description: INTERVENTIONS:  - Monitor percentage of each meal consumed  - Identify factors contributing to decreased intake, treat as appropriate  - Assist with meals as needed  - Monitor I&O, weight, and lab values if indicated  - Obtain nutrition services referral as needed  Outcome: Progressing     Problem: GENITOURINARY - ADULT  Goal: Maintains or returns to baseline urinary function  Description: INTERVENTIONS:  - Assess urinary function  - Encourage oral fluids to ensure adequate hydration if ordered  - Administer IV fluids as ordered to ensure adequate hydration  - Administer ordered medications as needed  - Offer frequent toileting  - Follow urinary retention protocol if ordered  Outcome: Progressing  Goal: Absence of urinary retention  Description: INTERVENTIONS:  - Assess patient’s ability to void and empty bladder  - Monitor I/O  - Bladder scan as needed  - Discuss with physician/AP medications to alleviate retention as needed  - Discuss catheterization for long term situations as appropriate  Outcome: Progressing     Problem: PAIN - ADULT  Goal: Verbalizes/displays adequate comfort level or baseline comfort level  Description: Interventions:  - Encourage patient to monitor pain and request assistance  - Assess pain using appropriate pain scale  - Administer analgesics based on type and severity of pain and evaluate response  - Implement non-pharmacological measures as appropriate and evaluate response  - Consider cultural and social influences on pain and pain management  - Notify physician/advanced practitioner if interventions unsuccessful or patient reports new pain  Outcome: Progressing     Problem: INFECTION - ADULT  Goal: Absence or prevention of progression during hospitalization  Description: INTERVENTIONS:  - Assess and monitor for signs and symptoms of infection  - Monitor lab/diagnostic results  - Monitor all insertion sites, i.e. indwelling lines, tubes, and drains  - Monitor endotracheal if appropriate and nasal secretions for changes in amount and color  - [place] appropriate cooling/warming therapies per order  - Administer medications as ordered  - Instruct and encourage patient and family to use good hand hygiene technique  - Identify and instruct in appropriate isolation precautions for identified infection/condition  Outcome: Progressing  Goal: Absence of fever/infection during neutropenic period  Description: INTERVENTIONS:  - Monitor WBC    Outcome: Progressing     Problem: SAFETY ADULT  Goal: Patient will remain free of falls  Description: INTERVENTIONS:  - Educate patient/family on patient safety including physical limitations  - Instruct patient to call for assistance with activity   - Consult OT/PT to assist with strengthening/mobility   - Keep Call bell within reach  - Keep bed low and locked with side rails adjusted as appropriate  - Keep care items and personal belongings within reach  - Initiate and maintain comfort rounds  - Make Fall Risk Sign visible to staff  - Apply yellow socks and bracelet for high fall risk patients  - Consider moving patient to room near nurses station  Outcome: Progressing  Goal: Maintain or return to baseline ADL function  Description: INTERVENTIONS:  -  Assess patient's ability to carry out ADLs; assess patient's baseline for ADL function and identify physical deficits which impact ability to perform ADLs (bathing, care of mouth/teeth, toileting, grooming, dressing, etc.)  - Assess/evaluate cause of self-care deficits   - Assess range of motion  - Assess patient's mobility; develop plan if impaired  - Assess patient's need for assistive devices and provide as appropriate  - Encourage maximum independence but intervene and supervise when necessary  - Involve family in performance of ADLs  - Assess for home care needs following discharge   - Consider OT consult to assist with ADL evaluation and planning for discharge  - Provide patient education as appropriate  Outcome: Progressing  Goal: Maintains/Returns to pre admission functional level  Description: INTERVENTIONS:  - Perform BMAT or MOVE assessment daily.   - Set and communicate daily mobility goal to care team and patient/family/caregiver.    - Collaborate with rehabilitation services on mobility goals if consulted  - Out of bed for toileting  - Record patient progress and toleration of activity level   Outcome: Progressing     Problem: DISCHARGE PLANNING  Goal: Discharge to home or other facility with appropriate resources  Description: INTERVENTIONS:  - Identify barriers to discharge w/patient and caregiver  - Arrange for needed discharge resources and transportation as appropriate  - Identify discharge learning needs (meds, wound care, etc.)  - Arrange for interpretive services to assist at discharge as needed  - Refer to Case Management Department for coordinating discharge planning if the patient needs post-hospital services based on physician/advanced practitioner order or complex needs related to functional status, cognitive ability, or social support system  Outcome: Progressing     Problem: Knowledge Deficit  Goal: Patient/family/caregiver demonstrates understanding of disease process, treatment plan, medications, and discharge instructions  Description: Complete learning assessment and assess knowledge base.  Interventions:  - Provide teaching at level of understanding  - Provide teaching via preferred learning methods  Outcome: Progressing

## 2023-07-25 NOTE — DISCHARGE INSTR - AVS FIRST PAGE
Bariatric/Weight Loss Surgery  Hospital Discharge Instructions  ACTIVITY:  Progress as feels comfortable - a good rule is:  if you are doing something and it begins to hurt, stop doing the activity. Walk every hour while at home. You may walk stairs if you do so slowly  You may shower 48 hours after surgery. Do not scrub incision sites. Blot gently with clean towel to dry incisions. (see #4 below)   Use your incentive spirometer 10 times per hour while awake for 1 week after surgery. Do NOT drive for 48 hours after surgery. No driving 24 hours after taking certain prescription pain medications. Examples of such medication are Percocet, Darvocet, Oxycodone, Tylenol #3, and Tylenol with Codeine. DIET  Stay on a liquid diet for 7 days after your surgery date, sipping slowly. Refer to your manual for examples of choices. Remember to keep your liquids sugar free or low calorie. You may have protein drinks. Make sure to drink 48 to 64 ounces per day of fluids. You may advance to a pureed diet one week after surgery as instructed by your diet progression pamphlet. Once you get approval from your surgeon at your first post operative visit, you may advance to the soft diet and remain on soft diet for 8 weeks unless otherwise instructed. MEDICATIONS:  The abdominal nerve block will wear off during the first 1-2 days that you are home, and you may become sore (especially over incision site/sites where abdominal wall is sutured). This may create a pulling sensation, especially while moving around, and will fade over time. Continue to take your Tylenol and your pain medication as instructed. Start vitamins and minerals one week after surgery or when you start stage 3/puree diet. Anti-acid Medication as per prescription. Other medications as indicated on the Physician Patient Discharge Instructions form given to you at the time of discharge.   Make sure that you are splitting your pill or tablet medications in halves or fourths or even crushing them before you take them. Capsules should be opened and mixed with water or jello. You need to do this for at least 4 weeks after surgery. Eventually you will be able to take your medications the regular way as they were prescribed. You will need to consult with your Family Doctor in regards to all your prescribed medication, particularly those for blood pressure and diabetes. As you lose weight, medical conditions may change, requiring an alteration or elimination of the drug dose. Monitor blood pressure closely and call PCP with any concerns. Sleeve Gastrectomy patients ONLY:  Complete full course of lovenox injections! DO NOT TAKE BIRTH CONTROL(BC) MEDICATIONS, INSERT BC VAGINAL RINGS, OR PLACE IUD OR ANY OTHER BC METHODS UNTIL 31 DAYS FROM DAY OF DISCHARGE FROM HOSPITAL. THIS PLACES YOU AT HIGH RISK FOR A POTENTIALLY LIFE THREATENING BLOOD CLOT. Remember to always use barrier methods for birth control and speak to your GYN about using two forms of birth control to start 31 days after surgery. It is very important to avoid pregnancy until at least 18-24 months after surgery. INCISION CARE  You may shower and get incisions wet 2 days after surgery. No soaking tub baths or swimming for 30 days after surgery. Keep abdominal area and incisions clean. Use soap and water to create a good lather and rinse off. Do not scrub incisions. If you have a drain, empty the drain as the nurses instructed. FOLLOW-UP APPOINTMENT should be made for one week after discharge. Call surgeon’s office at [phone] to schedule an appointment.     CALL YOUR DOCTOR FOR:  pain not controlled by pain medications, a temperature greater than 101.5° F, any increase or change in drainage or redness from any incision, any vomiting or inability to keep liquids down, shortness of breath, shoulder pain, or bleeding

## 2023-07-25 NOTE — PROGRESS NOTES
Progress Note - Bariatric Surgery   Keily Sandhu 43 y.o. female MRN: [id number]  Unit/Bed#: E5 -01 Encounter: [id number]      Subjective/Objective     Subjective:  Patient was seen and evaluated this morning at bedside. Patient is POD1 s/p Robotic Sleeve Gastrectomy. Patient denies fevers, chills, sweats, SOB, CP, calf pain. Pain adequately controlled on oral pain medication. Ambulating without assistance, voiding well, and using incentive spirometer. Tolerating liquid diet without nausea or vomiting today. Objective:    /96 (BP Location: Left arm)   Pulse 78   Temp 97.7 °F (36.5 °C) (Temporal)   Resp 17   Wt 113 kg (249 lb 9 oz)   LMP 07/23/2023   SpO2 92%   BMI 47.15 kg/m²       Intake/Output Summary (Last 24 hours) at 7/25/2023 0939  Last data filed at 7/25/2023 9790  Gross per 24 hour   Intake 2191.67 ml   Output 350 ml   Net 1841.67 ml       Invasive Devices     Peripheral Intravenous Line  Duration           Peripheral IV 07/24/23 Dorsal (posterior); Left Hand 1 day                ROS: 10-point system completed. All negative except see HPI.     Physical Exam    General Appearance:    Alert, cooperative, no distress, appears stated age   Head:    Normocephalic, without obvious abnormality, atraumatic   Lungs:     Respirations unlabored   Heart:    Regular rate and rhythm   Abdomen:     Soft, appropriate tenderness, no masses, no organomegaly, non-distended   Extremities:   Extremities normal, atraumatic, no cyanosis or edema   Neurologic:  Incision:  Psych:   Normal strength and sensation    Clean, dry, and intact    Normal mood and affect       Lab, Imaging and other studies:  CBC:   Lab Results   Component Value Date    WBC 10.20 (H) 07/25/2023    HGB 13.4 07/25/2023    HCT 39.5 07/25/2023    MCV 91 07/25/2023     07/25/2023    RBC 4.34 07/25/2023    MCH 30.9 07/25/2023    MCHC 33.9 07/25/2023    RDW 12.5 07/25/2023    MPV 10.9 07/25/2023   , CMP:   Lab Results Component Value Date    SODIUM 138 07/25/2023    K 3.7 07/25/2023     07/25/2023    CO2 24 07/25/2023    BUN 6 07/25/2023    CREATININE 1.02 07/25/2023    CALCIUM 8.7 07/25/2023    EGFR 69 07/25/2023        VTE Mechanical Prophylaxis: sequential compression device    Assessment/Plan  1)  Patient with Morbid Obesity s/p Robotic Sleeve Gastrectomy with stable post op course. Patient afebrile and hemodynamically stable. - Encourage PO fluids  - Recommend ambulation, use of SCDs when not ambulating, and incentive spirometry  - May give Lovenox   - Multimodal pain control  - SLIM consult pending  - Plan to D/C patient home today pending anticipated progression    Plan of care was discussed with patient.   Care plan discussed with Dr. Brandee Brock DO  Bariatric Surgery Fellow  7/25/2023  9:39 AM

## 2023-07-25 NOTE — PROGRESS NOTES
[facility]  Progress Note  Name: Regulo Mcdonald  MRN: [id number]  Unit/Bed#: E5 -01 I Date of Admission: 7/24/2023   Date of Service: 7/25/2023 I Hospital Day: 1    Assessment/Plan   * Class 3 severe obesity due to excess calories with serious comorbidity and body mass index (BMI) of 45.0 to 49.9 in adult [facility])  Assessment & Plan  Postop day 1 following laparoscopic sleeve gastrectomy  · Management per primary    MARIA LUISA (obstructive sleep apnea)  Assessment & Plan  Continue CPAP    Hypertension  Assessment & Plan  Maintained on lisinopril 10 mg daily  · Hold while inpatient  · Restart lisinopril on discharge     Gastroesophageal reflux disease  Assessment & Plan  Continue Pepcid    Type 2 diabetes mellitus without complication, without long-term current use of insulin [facility])  Assessment & Plan  Lab Results   Component Value Date    HGBA1C 6.6 (H) 05/12/2023       Recent Labs     07/24/23  1623 07/24/23  2046 07/25/23  0715 07/25/23  1109   POCGLU 210* 147* 97 97       Blood Sugar Average: Last 72 hrs:  (P) [id number]   On metformin and Trulicity as an outpatient  · Discontinue metformin  · Continue Trulicity            VTE Pharmacologic Prophylaxis:   Moderate Risk (Score 3-4) - Pharmacological DVT Prophylaxis Ordered: enoxaparin (Lovenox). Patient Centered Rounds: I performed bedside rounds with nursing staff today. Discussions with Specialists or Other Care Team Provider: none    Education and Discussions with Family / Patient: Patient declined call to .      Total Time Spent on Date of Encounter in care of patient: 35 minutes This time was spent on one or more of the following: performing physical exam; counseling and coordination of care; obtaining or reviewing history; documenting in the medical record; reviewing/ordering tests, medications or procedures; communicating with other healthcare professionals and discussing with patient's family/caregivers. Current Length of Stay: 1 day(s)  Current Patient Status: Inpatient   Certification Statement: per primary  Discharge Plan: [facility] is following this patient on consult. They are medically stable for discharge when deemed appropriate by primary service. Code Status: No Order    Subjective:   Patient seen resting comfortably in bed. She is just waking up. She states she is feeling well overall and has been able to drink the appropriate amount of liquids. She states that the only time that she feels nauseous is when she drinks too much too quickly. She denies any abdominal pain. She reports feeling well overall and is eager to go home. Objective:     Vitals:   Temp (24hrs), Av.8 °F (37.1 °C), Min:97.7 °F (36.5 °C), Max:99.8 °F (37.7 °C)    Temp:  [97.7 °F (36.5 °C)-99.8 °F (37.7 °C)] 97.7 °F (36.5 °C)  HR:  [78-99] 78  Resp:  [16-18] 17  BP: (135-160)/() 159/96  SpO2:  [92 %-93 %] 92 %  Body mass index is 47.15 kg/m². Input and Output Summary (last 24 hours): Intake/Output Summary (Last 24 hours) at 2023 1334  Last data filed at 2023 7711  Gross per 24 hour   Intake 1991.67 ml   Output 350 ml   Net 1641.67 ml       Physical Exam:   Physical Exam  Vitals and nursing note reviewed. Constitutional:       General: She is not in acute distress. Appearance: Normal appearance. She is obese. She is not ill-appearing, toxic-appearing or diaphoretic. HENT:      Head: Normocephalic and atraumatic. Cardiovascular:      Rate and Rhythm: Normal rate and regular rhythm. Heart sounds: No murmur heard. No friction rub. No gallop. Pulmonary:      Effort: Pulmonary effort is normal. No respiratory distress. Breath sounds: Normal breath sounds. No wheezing, rhonchi or rales. Abdominal:      General: Abdomen is flat. Bowel sounds are normal. There is no distension. Palpations: Abdomen is soft. Tenderness: There is no abdominal tenderness. Musculoskeletal:      Right lower leg: No edema. Left lower leg: No edema. Skin:     General: Skin is warm and dry. Coloration: Skin is not jaundiced or pale. Neurological:      General: No focal deficit present. Mental Status: She is alert. Mental status is at baseline. Additional Data:     Labs:  Results from last 7 days   Lab Units 07/25/23  0507   WBC Thousand/uL 10.20*   HEMOGLOBIN g/dL 13.4   HEMATOCRIT % 39.5   PLATELETS Thousands/uL 278     Results from last 7 days   Lab Units 07/25/23  0507   SODIUM mmol/L 138   POTASSIUM mmol/L 3.7   CHLORIDE mmol/L 108   CO2 mmol/L 24   BUN mg/dL 6   CREATININE mg/dL 1.02   ANION GAP mmol/L 6   CALCIUM mg/dL 8.7   GLUCOSE RANDOM mg/dL 106         Results from last 7 days   Lab Units 07/25/23  1109 07/25/23  0715 07/24/23  2046 07/24/23  1623 07/24/23  0914 07/24/23  0551   POC GLUCOSE mg/dl 97 97 147* 210* 161* 184*               Lines/Drains:  Invasive Devices     None                       Imaging: No pertinent imaging reviewed.     Recent Cultures (last 7 days):         Last 24 Hours Medication List:   Current Facility-Administered Medications   Medication Dose Route Frequency Provider Last Rate   • acetaminophen  975 mg Oral Q8H Mi Gomes PA-C      Or   • acetaminophen  975 mg Oral Q8H Mijaxon Gomes PA-C     • diphenhydrAMINE  25 mg Oral Q8H PRN Mi TIFFANY Gomes PA-C     • enoxaparin  40 mg Subcutaneous Q24H Mi TIFFANY Gomes PA-C     • famotidine  20 mg Intravenous BID Mi Gomes PA-C     • insulin lispro  2-12 Units Subcutaneous TID AC Ambrosio Lechuga PA-C     • insulin lispro  2-12 Units Subcutaneous HS Ambrosio Lechuga PA-C     • lactated ringers  100 mL/hr Intravenous Continuous Mijaxon Gomes PA-C 100 mL/hr (07/25/23 6573)   • metoclopramide  10 mg Intravenous Q6H PRN Mi Gomes PA-C     • morphine injection  4 mg Intravenous Q4H PRN Mi TIFFANY Gomes PA-C     • ondansetron  4 mg Intravenous Q6H PRN Mi N Rupell, PA-C     • oxyCODONE 10 mg Oral Q4H PRN Mi TIFFANY Gomes PA-C     • oxyCODONE  5 mg Oral Q4H PRN Mi TIFFANY Gomes PA-C     • phenol  2 spray Mouth/Throat Q2H PRN Mi TIFFANY Gomes PA-C     • promethazine  25 mg Intravenous Q6H PRN Mi TIFFANY Gomes PA-C     • scopolamine  1 patch Transdermal Once Mi TIFFANY Gomes PA-C     • simethicone  80 mg Oral 4x Daily PRN Mijaxon Gomes PA-C     • sodium chloride  125 mL/hr Intravenous Continuous Caye Homme,  mL/hr (07/24/23 5170)        Today, Patient Was Seen By: Esteban Johns PA-C    **Please Note: This note may have been constructed using a voice recognition system. **

## 2023-07-25 NOTE — DISCHARGE SUMMARY
Discharge Summary - Dick Steve 43 y.o. female MRN: [id number]    Unit/Bed#: E5 -01 Encounter: [id number]      Pre-Operative Diagnosis: Pre-Op Diagnosis Codes:     * Morbid obesity ([address]) [E66.01]     * Obstructive sleep apnea [G47.33]     * Diabetes mellitus ([address]) [E11.9]     * Hypertension [I10]     * Gastroesophageal reflux disease [K21.9]    Post-Operative Diagnosis: Post-Op Diagnosis Codes:     * Morbid obesity ([address]) [E66.01]     * Obstructive sleep apnea [G47.33]     * Diabetes mellitus ([address]) [E11.9]     * Hypertension [I10]     * Gastroesophageal reflux disease [K21.9]    Procedures Performed:  Procedure(s):  GASTRECTOMY SLEEVE W/ ROBOT & INTRAOPERATIVE EGD    Surgeon: Robbie Jenkins MD    See H & P for full details of admission and Operative Note for full details of operations performed. Patient tolerated surgery well without complications. In the morning postoperative Day 1, the patient had mild nausea and abdominal pain. Tolerated a clear liquid diet without vomiting. Able to ambulate and voiding independently. Patient was deemed ready for discharge home on lovenox. Patient was seen and examined prior to discharge. Provisions for Follow-Up Care:  See After Visit Summary/Discharge Instructions for information related to follow-up care and home orders. Disposition: Home, in stable condition. Planned Readmission: No    Discharge Medications:  See After Visit Summary/Discharge Instructions for reconciled discharge medications provided to patient and family. Post Operative instructions: Reviewed with patient and/or family.     Signature:   Neil Medellin PA-C  Date: 7/25/2023 Time: 9:38 AM No indicators present

## 2023-07-25 NOTE — PLAN OF CARE
Problem: Nutrition/Hydration-ADULT  Goal: Nutrient/Hydration intake appropriate for improving, restoring or maintaining nutritional needs  Description: Monitor and assess patient's nutrition/hydration status for malnutrition. Collaborate with interdisciplinary team and initiate plan and interventions as ordered. Monitor patient's weight and dietary intake as ordered or per policy. Utilize nutrition screening tool and intervene as necessary. Determine patient's food preferences and provide high-protein, high-caloric foods as appropriate.      INTERVENTIONS:  - Monitor oral intake, urinary output, labs, and treatment plans  - Assess nutrition and hydration status and recommend course of action  - Evaluate amount of meals eaten  - Assist patient with eating if necessary   - Allow adequate time for meals  - Recommend/ encourage appropriate diets, oral nutritional supplements, and vitamin/mineral supplements  - Order, calculate, and assess calorie counts as needed  - Recommend, monitor, and adjust tube feedings and TPN/PPN based on assessed needs  - Assess need for intravenous fluids  - Provide specific nutrition/hydration education as appropriate  - Include patient/family/caregiver in decisions related to nutrition  7/25/2023 1208 by Mariela Kurtz RN  Outcome: Adequate for Discharge  7/25/2023 0800 by Mariela Kurtz RN  Outcome: Progressing     Problem: RESPIRATORY - ADULT  Goal: Achieves optimal ventilation and oxygenation  Description: INTERVENTIONS:  - Assess for changes in respiratory status  - Assess for changes in mentation and behavior  - Position to facilitate oxygenation and minimize respiratory effort  - Oxygen administered by appropriate delivery if ordered  - Initiate smoking cessation education as indicated  - Encourage broncho-pulmonary hygiene including cough, deep breathe, Incentive Spirometry  - Assess the need for suctioning and aspirate as needed  - Assess and instruct to report SOB or any respiratory difficulty  - Respiratory Therapy support as indicated  7/25/2023 1208 by Ayanna Vee RN  Outcome: Adequate for Discharge  7/25/2023 0800 by Aaynna Vee RN  Outcome: Progressing     Problem: GASTROINTESTINAL - ADULT  Goal: Minimal or absence of nausea and/or vomiting  Description: INTERVENTIONS:  - Administer IV fluids if ordered to ensure adequate hydration  - Maintain NPO status until nausea and vomiting are resolved  - Nasogastric tube if ordered  - Administer ordered antiemetic medications as needed  - Provide nonpharmacologic comfort measures as appropriate  - Advance diet as tolerated, if ordered  - Consider nutrition services referral to assist patient with adequate nutrition and appropriate food choices  7/25/2023 1208 by Ayanna Vee RN  Outcome: Adequate for Discharge  7/25/2023 0800 by Ayanna Vee RN  Outcome: Progressing  Goal: Maintains adequate nutritional intake  Description: INTERVENTIONS:  - Monitor percentage of each meal consumed  - Identify factors contributing to decreased intake, treat as appropriate  - Assist with meals as needed  - Monitor I&O, weight, and lab values if indicated  - Obtain nutrition services referral as needed  7/25/2023 1208 by Ayanna Vee RN  Outcome: Adequate for Discharge  7/25/2023 0800 by Ayanna Vee RN  Outcome: Progressing     Problem: GENITOURINARY - ADULT  Goal: Maintains or returns to baseline urinary function  Description: INTERVENTIONS:  - Assess urinary function  - Encourage oral fluids to ensure adequate hydration if ordered  - Administer IV fluids as ordered to ensure adequate hydration  - Administer ordered medications as needed  - Offer frequent toileting  - Follow urinary retention protocol if ordered  7/25/2023 1208 by Ayanna Vee RN  Outcome: Adequate for Discharge  7/25/2023 0800 by Ayanna Vee RN  Outcome: Progressing  Goal: Absence of urinary retention  Description: INTERVENTIONS:  - Assess patient’s ability to void and empty bladder  - Monitor I/O  - Bladder scan as needed  - Discuss with physician/AP medications to alleviate retention as needed  - Discuss catheterization for long term situations as appropriate  7/25/2023 1208 by Sohail Samson RN  Outcome: Adequate for Discharge  7/25/2023 0800 by Sohail Samson RN  Outcome: Progressing     Problem: PAIN - ADULT  Goal: Verbalizes/displays adequate comfort level or baseline comfort level  Description: Interventions:  - Encourage patient to monitor pain and request assistance  - Assess pain using appropriate pain scale  - Administer analgesics based on type and severity of pain and evaluate response  - Implement non-pharmacological measures as appropriate and evaluate response  - Consider cultural and social influences on pain and pain management  - Notify physician/advanced practitioner if interventions unsuccessful or patient reports new pain  7/25/2023 1208 by Sohail Samson RN  Outcome: Adequate for Discharge  7/25/2023 0800 by Sohail Samson RN  Outcome: Progressing     Problem: INFECTION - ADULT  Goal: Absence or prevention of progression during hospitalization  Description: INTERVENTIONS:  - Assess and monitor for signs and symptoms of infection  - Monitor lab/diagnostic results  - Monitor all insertion sites, i.e. indwelling lines, tubes, and drains  - Monitor endotracheal if appropriate and nasal secretions for changes in amount and color  - [place] appropriate cooling/warming therapies per order  - Administer medications as ordered  - Instruct and encourage patient and family to use good hand hygiene technique  - Identify and instruct in appropriate isolation precautions for identified infection/condition  7/25/2023 1208 by Sohail Samson RN  Outcome: Adequate for Discharge  7/25/2023 0800 by Sohail Samson RN  Outcome: Progressing  Goal: Absence of fever/infection during neutropenic period  Description: INTERVENTIONS:  - Monitor WBC    7/25/2023 1208 by Devon Hunt RN  Outcome: Adequate for Discharge  7/25/2023 0800 by Devon Hunt RN  Outcome: Progressing     Problem: SAFETY ADULT  Goal: Patient will remain free of falls  Description: INTERVENTIONS:  - Educate patient/family on patient safety including physical limitations  - Instruct patient to call for assistance with activity   - Consult OT/PT to assist with strengthening/mobility   - Keep Call bell within reach  - Keep bed low and locked with side rails adjusted as appropriate  - Keep care items and personal belongings within reach  - Initiate and maintain comfort rounds  - Make Fall Risk Sign visible to staff  - Apply yellow socks and bracelet for high fall risk patients  - Consider moving patient to room near nurses station  7/25/2023 1208 by Devon Hunt RN  Outcome: Adequate for Discharge  7/25/2023 0800 by Devon Hunt RN  Outcome: Progressing  Goal: Maintain or return to baseline ADL function  Description: INTERVENTIONS:  -  Assess patient's ability to carry out ADLs; assess patient's baseline for ADL function and identify physical deficits which impact ability to perform ADLs (bathing, care of mouth/teeth, toileting, grooming, dressing, etc.)  - Assess/evaluate cause of self-care deficits   - Assess range of motion  - Assess patient's mobility; develop plan if impaired  - Assess patient's need for assistive devices and provide as appropriate  - Encourage maximum independence but intervene and supervise when necessary  - Involve family in performance of ADLs  - Assess for home care needs following discharge   - Consider OT consult to assist with ADL evaluation and planning for discharge  - Provide patient education as appropriate  7/25/2023 1208 by Devon Hunt RN  Outcome: Adequate for Discharge  7/25/2023 0800 by Devon Hunt RN  Outcome: Progressing  Goal: Maintains/Returns to pre admission functional level  Description: INTERVENTIONS:  - Perform BMAT or MOVE assessment daily.   - Set and communicate daily mobility goal to care team and patient/family/caregiver. - Collaborate with rehabilitation services on mobility goals if consulted  - Out of bed for toileting  - Record patient progress and toleration of activity level   7/25/2023 1208 by Mariela Kurtz RN  Outcome: Adequate for Discharge  7/25/2023 0800 by Mariela Kurtz RN  Outcome: Progressing     Problem: DISCHARGE PLANNING  Goal: Discharge to home or other facility with appropriate resources  Description: INTERVENTIONS:  - Identify barriers to discharge w/patient and caregiver  - Arrange for needed discharge resources and transportation as appropriate  - Identify discharge learning needs (meds, wound care, etc.)  - Arrange for interpretive services to assist at discharge as needed  - Refer to Case Management Department for coordinating discharge planning if the patient needs post-hospital services based on physician/advanced practitioner order or complex needs related to functional status, cognitive ability, or social support system  7/25/2023 1208 by Mariela Kurtz RN  Outcome: Adequate for Discharge  7/25/2023 0800 by Mariela Kurtz RN  Outcome: Progressing     Problem: Knowledge Deficit  Goal: Patient/family/caregiver demonstrates understanding of disease process, treatment plan, medications, and discharge instructions  Description: Complete learning assessment and assess knowledge base.   Interventions:  - Provide teaching at level of understanding  - Provide teaching via preferred learning methods  7/25/2023 1208 by Mariela Kurtz RN  Outcome: Adequate for Discharge  7/25/2023 0800 by Mariela Kurtz RN  Outcome: Progressing

## 2023-07-25 NOTE — ASSESSMENT & PLAN NOTE
Lab Results   Component Value Date    HGBA1C 6.6 (H) 05/12/2023       Recent Labs     07/24/23  1623 07/24/23  2046 07/25/23  0715 07/25/23  1109   POCGLU 210* 147* 97 97       Blood Sugar Average: Last 72 hrs:  (P) [id number]   On metformin and Trulicity as an outpatient  · Discontinue metformin  · Continue Trulicity

## 2023-07-25 NOTE — DISCHARGE INSTRUCTIONS
your medications from [facility] in [address] [place] [address] or cut your pills and open capsules, mix with liquid to drink. Take Tylenol every 8 hours around the clock, unless instructed otherwise  Take your omeprazole daily  It is important to stay hydrated and follow your discharge diet progression   Mild nausea is ok as long as you can drink fluids, sip very slowly and get up and walk during any periods of nausea  You may shower normally after 48 hours, but do not scrub incision sites, blot gently with clean towel to dry incisions  Take home medications as usual unless instructed otherwise while in hospital  Follow up with Dr. Carolynn Leach and your PCP within the next week  Sleeve gastrectomy patients ONLY: Complete full course of lovenox injections!

## 2023-07-25 NOTE — PLAN OF CARE
Problem: Nutrition/Hydration-ADULT  Goal: Nutrient/Hydration intake appropriate for improving, restoring or maintaining nutritional needs  Description: Monitor and assess patient's nutrition/hydration status for malnutrition. Collaborate with interdisciplinary team and initiate plan and interventions as ordered. Monitor patient's weight and dietary intake as ordered or per policy. Utilize nutrition screening tool and intervene as necessary. Determine patient's food preferences and provide high-protein, high-caloric foods as appropriate.      INTERVENTIONS:  - Monitor oral intake, urinary output, labs, and treatment plans  - Assess nutrition and hydration status and recommend course of action  - Evaluate amount of meals eaten  - Assist patient with eating if necessary   - Allow adequate time for meals  - Recommend/ encourage appropriate diets, oral nutritional supplements, and vitamin/mineral supplements  - Order, calculate, and assess calorie counts as needed  - Recommend, monitor, and adjust tube feedings and TPN/PPN based on assessed needs  - Assess need for intravenous fluids  - Provide specific nutrition/hydration education as appropriate  - Include patient/family/caregiver in decisions related to nutrition  Outcome: Progressing     Problem: RESPIRATORY - ADULT  Goal: Achieves optimal ventilation and oxygenation  Description: INTERVENTIONS:  - Assess for changes in respiratory status  - Assess for changes in mentation and behavior  - Position to facilitate oxygenation and minimize respiratory effort  - Oxygen administered by appropriate delivery if ordered  - Initiate smoking cessation education as indicated  - Encourage broncho-pulmonary hygiene including cough, deep breathe, Incentive Spirometry  - Assess the need for suctioning and aspirate as needed  - Assess and instruct to report SOB or any respiratory difficulty  - Respiratory Therapy support as indicated  Outcome: Progressing     Problem: GASTROINTESTINAL - ADULT  Goal: Minimal or absence of nausea and/or vomiting  Description: INTERVENTIONS:  - Administer IV fluids if ordered to ensure adequate hydration  - Maintain NPO status until nausea and vomiting are resolved  - Nasogastric tube if ordered  - Administer ordered antiemetic medications as needed  - Provide nonpharmacologic comfort measures as appropriate  - Advance diet as tolerated, if ordered  - Consider nutrition services referral to assist patient with adequate nutrition and appropriate food choices  Outcome: Progressing  Goal: Maintains adequate nutritional intake  Description: INTERVENTIONS:  - Monitor percentage of each meal consumed  - Identify factors contributing to decreased intake, treat as appropriate  - Assist with meals as needed  - Monitor I&O, weight, and lab values if indicated  - Obtain nutrition services referral as needed  Outcome: Progressing     Problem: GENITOURINARY - ADULT  Goal: Maintains or returns to baseline urinary function  Description: INTERVENTIONS:  - Assess urinary function  - Encourage oral fluids to ensure adequate hydration if ordered  - Administer IV fluids as ordered to ensure adequate hydration  - Administer ordered medications as needed  - Offer frequent toileting  - Follow urinary retention protocol if ordered  Outcome: Progressing  Goal: Absence of urinary retention  Description: INTERVENTIONS:  - Assess patient’s ability to void and empty bladder  - Monitor I/O  - Bladder scan as needed  - Discuss with physician/AP medications to alleviate retention as needed  - Discuss catheterization for long term situations as appropriate  Outcome: Progressing     Problem: PAIN - ADULT  Goal: Verbalizes/displays adequate comfort level or baseline comfort level  Description: Interventions:  - Encourage patient to monitor pain and request assistance  - Assess pain using appropriate pain scale  - Administer analgesics based on type and severity of pain and evaluate response  - Implement non-pharmacological measures as appropriate and evaluate response  - Consider cultural and social influences on pain and pain management  - Notify physician/advanced practitioner if interventions unsuccessful or patient reports new pain  Outcome: Progressing     Problem: INFECTION - ADULT  Goal: Absence or prevention of progression during hospitalization  Description: INTERVENTIONS:  - Assess and monitor for signs and symptoms of infection  - Monitor lab/diagnostic results  - Monitor all insertion sites, i.e. indwelling lines, tubes, and drains  - Monitor endotracheal if appropriate and nasal secretions for changes in amount and color  - [place] appropriate cooling/warming therapies per order  - Administer medications as ordered  - Instruct and encourage patient and family to use good hand hygiene technique  - Identify and instruct in appropriate isolation precautions for identified infection/condition  Outcome: Progressing  Goal: Absence of fever/infection during neutropenic period  Description: INTERVENTIONS:  - Monitor WBC    Outcome: Progressing     Problem: SAFETY ADULT  Goal: Patient will remain free of falls  Description: INTERVENTIONS:  - Educate patient/family on patient safety including physical limitations  - Instruct patient to call for assistance with activity   - Consult OT/PT to assist with strengthening/mobility   - Keep Call bell within reach  - Keep bed low and locked with side rails adjusted as appropriate  - Keep care items and personal belongings within reach  - Initiate and maintain comfort rounds  - Make Fall Risk Sign visible to staff  - Apply yellow socks and bracelet for high fall risk patients  - Consider moving patient to room near nurses station  Outcome: Progressing  Goal: Maintain or return to baseline ADL function  Description: INTERVENTIONS:  -  Assess patient's ability to carry out ADLs; assess patient's baseline for ADL function and identify physical deficits which impact ability to perform ADLs (bathing, care of mouth/teeth, toileting, grooming, dressing, etc.)  - Assess/evaluate cause of self-care deficits   - Assess range of motion  - Assess patient's mobility; develop plan if impaired  - Assess patient's need for assistive devices and provide as appropriate  - Encourage maximum independence but intervene and supervise when necessary  - Involve family in performance of ADLs  - Assess for home care needs following discharge   - Consider OT consult to assist with ADL evaluation and planning for discharge  - Provide patient education as appropriate  Outcome: Progressing  Goal: Maintains/Returns to pre admission functional level  Description: INTERVENTIONS:  - Perform BMAT or MOVE assessment daily.   - Set and communicate daily mobility goal to care team and patient/family/caregiver.    - Collaborate with rehabilitation services on mobility goals if consulted  - Out of bed for toileting  - Record patient progress and toleration of activity level   Outcome: Progressing     Problem: DISCHARGE PLANNING  Goal: Discharge to home or other facility with appropriate resources  Description: INTERVENTIONS:  - Identify barriers to discharge w/patient and caregiver  - Arrange for needed discharge resources and transportation as appropriate  - Identify discharge learning needs (meds, wound care, etc.)  - Arrange for interpretive services to assist at discharge as needed  - Refer to Case Management Department for coordinating discharge planning if the patient needs post-hospital services based on physician/advanced practitioner order or complex needs related to functional status, cognitive ability, or social support system  Outcome: Progressing     Problem: Knowledge Deficit  Goal: Patient/family/caregiver demonstrates understanding of disease process, treatment plan, medications, and discharge instructions  Description: Complete learning assessment and assess knowledge base.  Interventions:  - Provide teaching at level of understanding  - Provide teaching via preferred learning methods  Outcome: Progressing

## 2023-07-26 ENCOUNTER — TELEPHONE (OUTPATIENT)
Dept: MEDSURG UNIT | Facility: HOSPITAL | Age: 38
End: 2023-07-26

## 2023-07-26 PROCEDURE — 88307 TISSUE EXAM BY PATHOLOGIST: CPT | Performed by: PATHOLOGY

## 2023-07-26 NOTE — UTILIZATION REVIEW
NOTIFICATION OF ADMISSION DISCHARGE   This is a Notification of Discharge from [address] [place] [address]. Please be advised that this patient has been discharge from our facility. Below you will find the admission and discharge date and time including the patient’s disposition. UTILIZATION REVIEW CONTACT:  Flakita Baltazar  Utilization   Network Utilization Review Department  Phone: [phone] x carefully listen to the prompts. All voicemails are confidential.  Email: [email or web address]     ADMISSION INFORMATION  PRESENTATION DATE: 7/24/2023  5:08 AM  OBERVATION ADMISSION DATE:   INPATIENT ADMISSION DATE: 7/24/23  8:31 AM   DISCHARGE DATE: 7/25/2023  4:05 PM   DISPOSITION:Home/Self Care    IMPORTANT INFORMATION:  Send all requests for admission clinical reviews, approved or denied determinations and any other requests to dedicated fax number below belonging to the campus where the patient is receiving treatment.  List of dedicated fax numbers:  [place] DENIALS (Administrative/Medical Necessity) [phone] [address] [facility] (Maternity/NICU/Pediatrics) [phone]   [facility] [phone]   [facility] [phone] [facility] [phone]   [address] [place] [address] [phone]   [facility] [phone]   [address] [place] [address] [address] [facility] [phone]   [address] [place] [address] [phone] [address] [place] [address] [phone]   [address] [facility] [address] [facility] [phone]

## 2023-07-26 NOTE — TELEPHONE ENCOUNTER
Post op follow up phone call completed. Pt is sipping liquids and has consumed about 16 oz. Using IS as instructed, reinforced importance of using IS to help prevent pneumonia. Ambulating about home without difficulty. Pain controlled with analgesia. Reaffirmed examples of liquid diet over the next week. Pt stated understanding about discharge instructions and medication adjustments. Tolerating self administration of Lovenox injections without difficulty. Follow up appt with surgeon scheduled for next week. Instructed to call with any additional questions or concerns.

## 2023-08-01 ENCOUNTER — APPOINTMENT (EMERGENCY)
Dept: CT IMAGING | Facility: HOSPITAL | Age: 38
End: 2023-08-01
Payer: MEDICARE

## 2023-08-01 ENCOUNTER — HOSPITAL ENCOUNTER (EMERGENCY)
Facility: HOSPITAL | Age: 38
Discharge: HOME | End: 2023-08-01
Attending: EMERGENCY MEDICINE
Payer: MEDICARE

## 2023-08-01 ENCOUNTER — APPOINTMENT (EMERGENCY)
Dept: ULTRASOUND IMAGING | Facility: HOSPITAL | Age: 38
End: 2023-08-01
Payer: MEDICARE

## 2023-08-01 VITALS
TEMPERATURE: 98.4 F | DIASTOLIC BLOOD PRESSURE: 95 MMHG | HEART RATE: 63 BPM | RESPIRATION RATE: 18 BRPM | SYSTOLIC BLOOD PRESSURE: 148 MMHG | OXYGEN SATURATION: 100 %

## 2023-08-01 DIAGNOSIS — K85.90 PANCREATITIS: ICD-10-CM

## 2023-08-01 DIAGNOSIS — K76.0 HEPATIC STEATOSIS: ICD-10-CM

## 2023-08-01 DIAGNOSIS — D25.9 LEIOMYOMA OF UTERUS: ICD-10-CM

## 2023-08-01 DIAGNOSIS — R10.9 ABDOMINAL PAIN: Primary | ICD-10-CM

## 2023-08-01 DIAGNOSIS — R74.01 TRANSAMINITIS: ICD-10-CM

## 2023-08-01 DIAGNOSIS — N28.1 RENAL CYST: ICD-10-CM

## 2023-08-01 LAB
2HR DELTA HS TROPONIN: 2 NG/L
ALBUMIN SERPL BCP-MCNC: 3.9 G/DL (ref 3.5–5)
ALP SERPL-CCNC: 55 U/L (ref 34–104)
ALT SERPL W P-5'-P-CCNC: 73 U/L (ref 7–52)
ANION GAP SERPL CALCULATED.3IONS-SCNC: 14 MMOL/L
APTT PPP: 28 SECONDS (ref 23–37)
AST SERPL W P-5'-P-CCNC: 48 U/L (ref 13–39)
ATRIAL RATE: 75 BPM
ATRIAL RATE: 80 BPM
BASOPHILS # BLD AUTO: 0.02 THOUSANDS/ÂΜL (ref 0–0.1)
BASOPHILS NFR BLD AUTO: 0 % (ref 0–1)
BILIRUB SERPL-MCNC: 0.67 MG/DL (ref 0.2–1)
BUN SERPL-MCNC: 9 MG/DL (ref 5–25)
CALCIUM SERPL-MCNC: 9.2 MG/DL (ref 8.4–10.2)
CARDIAC TROPONIN I PNL SERPL HS: 3 NG/L
CARDIAC TROPONIN I PNL SERPL HS: 5 NG/L
CHLORIDE SERPL-SCNC: 103 MMOL/L (ref 96–108)
CO2 SERPL-SCNC: 18 MMOL/L (ref 21–32)
CREAT SERPL-MCNC: 1.06 MG/DL (ref 0.6–1.3)
EOSINOPHIL # BLD AUTO: 0.23 THOUSAND/ÂΜL (ref 0–0.61)
EOSINOPHIL NFR BLD AUTO: 4 % (ref 0–6)
ERYTHROCYTE [DISTWIDTH] IN BLOOD BY AUTOMATED COUNT: 12.5 % (ref 11.6–15.1)
EXT PREGNANCY TEST URINE: NEGATIVE
EXT. CONTROL: NORMAL
GFR SERPL CREATININE-BSD FRML MDRD: 66 ML/MIN/1.73SQ M
GLUCOSE SERPL-MCNC: 106 MG/DL (ref 65–140)
HCT VFR BLD AUTO: 41.6 % (ref 34.8–46.1)
HGB BLD-MCNC: 14.1 G/DL (ref 11.5–15.4)
IMM GRANULOCYTES # BLD AUTO: 0.02 THOUSAND/UL (ref 0–0.2)
IMM GRANULOCYTES NFR BLD AUTO: 0 % (ref 0–2)
INR PPP: 1.05 (ref 0.84–1.19)
LACTATE SERPL-SCNC: 0.8 MMOL/L (ref 0.5–2)
LIPASE SERPL-CCNC: 219 U/L (ref 11–82)
LYMPHOCYTES # BLD AUTO: 2.21 THOUSANDS/ÂΜL (ref 0.6–4.47)
LYMPHOCYTES NFR BLD AUTO: 44 % (ref 14–44)
MCH RBC QN AUTO: 30.6 PG (ref 26.8–34.3)
MCHC RBC AUTO-ENTMCNC: 33.9 G/DL (ref 31.4–37.4)
MCV RBC AUTO: 90 FL (ref 82–98)
MONOCYTES # BLD AUTO: 0.59 THOUSAND/ÂΜL (ref 0.17–1.22)
MONOCYTES NFR BLD AUTO: 11 % (ref 4–12)
NEUTROPHILS # BLD AUTO: 2.17 THOUSANDS/ÂΜL (ref 1.85–7.62)
NEUTS SEG NFR BLD AUTO: 41 % (ref 43–75)
NRBC BLD AUTO-RTO: 0 /100 WBCS
P AXIS: 18 DEGREES
P AXIS: 22 DEGREES
PLATELET # BLD AUTO: 283 THOUSANDS/UL (ref 149–390)
PMV BLD AUTO: 11 FL (ref 8.9–12.7)
POTASSIUM SERPL-SCNC: 3.5 MMOL/L (ref 3.5–5.3)
PR INTERVAL: 144 MS
PR INTERVAL: 144 MS
PROT SERPL-MCNC: 7.5 G/DL (ref 6.4–8.4)
PROTHROMBIN TIME: 13.7 SECONDS (ref 11.6–14.5)
QRS AXIS: -21 DEGREES
QRS AXIS: 21 DEGREES
QRSD INTERVAL: 76 MS
QRSD INTERVAL: 76 MS
QT INTERVAL: 370 MS
QT INTERVAL: 398 MS
QTC INTERVAL: 426 MS
QTC INTERVAL: 444 MS
RBC # BLD AUTO: 4.61 MILLION/UL (ref 3.81–5.12)
SODIUM SERPL-SCNC: 135 MMOL/L (ref 135–147)
T WAVE AXIS: -10 DEGREES
T WAVE AXIS: -11 DEGREES
VENTRICULAR RATE: 75 BPM
VENTRICULAR RATE: 80 BPM
WBC # BLD AUTO: 5.24 THOUSAND/UL (ref 4.31–10.16)

## 2023-08-01 PROCEDURE — 84484 ASSAY OF TROPONIN QUANT: CPT

## 2023-08-01 PROCEDURE — 99285 EMERGENCY DEPT VISIT HI MDM: CPT

## 2023-08-01 PROCEDURE — 76705 ECHO EXAM OF ABDOMEN: CPT

## 2023-08-01 PROCEDURE — 83690 ASSAY OF LIPASE: CPT

## 2023-08-01 PROCEDURE — 85610 PROTHROMBIN TIME: CPT

## 2023-08-01 PROCEDURE — 81025 URINE PREGNANCY TEST: CPT

## 2023-08-01 PROCEDURE — 96374 THER/PROPH/DIAG INJ IV PUSH: CPT

## 2023-08-01 PROCEDURE — 83605 ASSAY OF LACTIC ACID: CPT

## 2023-08-01 PROCEDURE — 74177 CT ABD & PELVIS W/CONTRAST: CPT

## 2023-08-01 PROCEDURE — 93005 ELECTROCARDIOGRAM TRACING: CPT

## 2023-08-01 PROCEDURE — G1004 CDSM NDSC: HCPCS

## 2023-08-01 PROCEDURE — 71275 CT ANGIOGRAPHY CHEST: CPT

## 2023-08-01 PROCEDURE — 85730 THROMBOPLASTIN TIME PARTIAL: CPT

## 2023-08-01 PROCEDURE — 96361 HYDRATE IV INFUSION ADD-ON: CPT

## 2023-08-01 PROCEDURE — 80053 COMPREHEN METABOLIC PANEL: CPT

## 2023-08-01 PROCEDURE — 85025 COMPLETE CBC W/AUTO DIFF WBC: CPT

## 2023-08-01 PROCEDURE — 36415 COLL VENOUS BLD VENIPUNCTURE: CPT

## 2023-08-01 PROCEDURE — 93010 ELECTROCARDIOGRAM REPORT: CPT | Performed by: STUDENT IN AN ORGANIZED HEALTH CARE EDUCATION/TRAINING PROGRAM

## 2023-08-01 RX ORDER — ONDANSETRON 4 MG/1
4 TABLET, ORALLY DISINTEGRATING ORAL EVERY 8 HOURS PRN
Qty: 12 TABLET | Refills: 0 | Status: ON HOLD | OUTPATIENT
Start: 2023-08-01

## 2023-08-01 RX ORDER — OXYCODONE HYDROCHLORIDE AND ACETAMINOPHEN 5; 325 MG/1; MG/1
1 TABLET ORAL EVERY 4 HOURS PRN
Qty: 8 TABLET | Refills: 0 | Status: ON HOLD | OUTPATIENT
Start: 2023-08-01

## 2023-08-01 RX ORDER — MORPHINE SULFATE 4 MG/ML
4 INJECTION, SOLUTION INTRAMUSCULAR; INTRAVENOUS ONCE
Status: COMPLETED | OUTPATIENT
Start: 2023-08-01 | End: 2023-08-01

## 2023-08-01 RX ADMIN — IOHEXOL 100 ML: 350 INJECTION, SOLUTION INTRAVENOUS at 04:58

## 2023-08-01 RX ADMIN — SODIUM CHLORIDE 1000 ML: 0.9 INJECTION, SOLUTION INTRAVENOUS at 03:48

## 2023-08-01 RX ADMIN — MORPHINE SULFATE 4 MG: 4 INJECTION INTRAVENOUS at 03:52

## 2023-08-01 RX ADMIN — IOHEXOL 5 ML: 240 INJECTION, SOLUTION INTRATHECAL; INTRAVASCULAR; INTRAVENOUS; ORAL at 04:58

## 2023-08-01 NOTE — ED PROVIDER NOTES
History  Chief Complaint   Patient presents with   • Chest Pain     Pt brought in by EMS d/t chest paint hat radiates to her mid back. Pt recently had gastric sleeve 7/24/2023. The patient is a 21-year-old female with history of diabetes mellitus, hypertension, obesity, as well as gastric sleeve performed in this facility on 7/24/2023 by Dr. Renan Mcarthur who presents to the ED for evaluation of epigastric and chest pain that radiates to her back that woke her from sleep. She describes it as similar to her gas pain, however despite belching it did not improve. She attempted drinking warm water without improvement. She does report taking deep breaths worsens the pain. Her last bowel movement was today, though she reports it was small. she otherwise denies fever, chills, nausea, vomiting, diarrhea, melena, hematochezia, dysuria, hematuria, leg swelling, calf pain, hemoptysis, history of DVT/PE, drainage or redness surrounding incision sites. Prior to Admission Medications   Prescriptions Last Dose Informant Patient Reported? Taking? Blood Glucose Monitoring Suppl (OneTouch Verio Flex System) w/Device KIT   No No   Sig: TEST EVERY MORNING   Blood Pressure Monitoring (Adult Blood Pressure Cuff Lg) KIT   No No   Sig: Use in the morning   Patient not taking: Reported on 4/6/2023   Insulin Pen Needle 30G X 8 MM MISC   No No   Sig: Use once a week   Multiple Vitamins-Minerals (multivitamin with minerals) tablet   Yes No   Sig: Take 1 tablet by mouth daily   OneTouch Delica Lancets 05R MISC   No No   Sig: Check blood sugars twice daily. Please substitute with appropriate alternative as covered by patient's insurance.  Dx: E11.65   OneTouch Verio test strip   No No   Sig: Use 1 each in the morning Use as instructed   Trulicity 1.5 PH/2.8LC injection   No No   Sig: ADMINISTER 1.5 MG UNDER THE SKIN EVERY 7 DAYS   acetaminophen (TYLENOL) 160 mg/5 mL suspension   No No   Sig: Take 30.4 mL (975 mg total) by mouth every 8 (eight) hours for 7 days   cetirizine (ZyrTEC) 10 mg tablet   No No   Sig: Take 1 tablet (10 mg total) by mouth in the morning. cholecalciferol (VITAMIN D3) 400 units tablet   Yes No   Sig: Take 2,000 Units by mouth daily   enoxaparin (LOVENOX) 40 mg/0.4 mL   No No   Sig: Inject 0.4 mL (40 mg total) under the skin daily for 13 days Do not start before July 25, 2023. loratadine (CLARITIN) 10 mg tablet   Yes No   Sig: Take 10 mg by mouth daily As needed   omeprazole (PriLOSEC) 20 mg delayed release capsule   No No   Sig: Take 1 capsule (20 mg total) by mouth daily Do not start before July 25, 2023. oxyCODONE (Roxicodone) 5 immediate release tablet   No No   Sig: Take 1 tablet (5 mg total) by mouth every 4 (four) hours as needed for moderate pain Max Daily Amount: 30 mg Do not start before July 25, 2023. Facility-Administered Medications: None       Past Medical History:   Diagnosis Date   • Diabetes mellitus ([address])    • Hypertension    • Obesity    • Periodontal disease    • Risk factors for obstructive sleep apnea 08/08/2022   • Seasonal allergies        Past Surgical History:   Procedure Laterality Date   • CATARACT EXTRACTION Left 06/08/2022   • CATARACT EXTRACTION Right 11/10/2021   • COLONOSCOPY  2013   • OK LAPS [facility] RSTRICTIV PX LONGITUDINAL GASTRECTOMY N/A 7/24/2023    Procedure: GASTRECTOMY SLEEVE W/ ROBOT & INTRAOPERATIVE EGD;  Surgeon: Justus Gonzalez MD;  Location: [facility];  Service: Bariatrics       Family History   Problem Relation Age of Onset   • Obesity Mother    • Sleep apnea Neg Hx      I have reviewed and agree with the history as documented.     E-Cigarette/Vaping   • E-Cigarette Use Never User      E-Cigarette/Vaping Substances     Social History     Tobacco Use   • Smoking status: Never   • Smokeless tobacco: Never   Vaping Use   • Vaping Use: Never used   Substance Use Topics   • Alcohol use: Never   • Drug use: Never       Review of Systems   Constitutional: Negative for chills and fever. HENT: Negative for congestion and rhinorrhea. Respiratory: Negative for cough and shortness of breath. Cardiovascular: Positive for chest pain. Negative for leg swelling. Gastrointestinal: Positive for abdominal pain. Negative for constipation, diarrhea, nausea and vomiting. Genitourinary: Negative for dysuria and flank pain. Musculoskeletal: Positive for back pain. Negative for arthralgias and myalgias. Skin: Negative for rash and wound. Neurological: Negative for dizziness, weakness, numbness and headaches. Psychiatric/Behavioral: Negative for behavioral problems. Physical Exam  Physical Exam  Vitals and nursing note reviewed. Constitutional:       General: She is not in acute distress. Appearance: She is well-developed. She is not ill-appearing or toxic-appearing. Comments: Uncomfortable appearing    HENT:      Head: Normocephalic and atraumatic. Eyes:      Conjunctiva/sclera: Conjunctivae normal.   Cardiovascular:      Rate and Rhythm: Normal rate and regular rhythm. Heart sounds: No murmur heard. Pulmonary:      Effort: Pulmonary effort is normal. No respiratory distress. Breath sounds: Normal breath sounds. No decreased breath sounds, wheezing, rhonchi or rales. Abdominal:      Palpations: Abdomen is soft. Tenderness: There is abdominal tenderness in the epigastric area and left upper quadrant. There is no guarding or rebound. Comments: Incision sites clean and dry   Musculoskeletal:         General: No swelling. Cervical back: Neck supple. Right lower leg: No tenderness. No edema. Left lower leg: No tenderness. No edema. Skin:     General: Skin is warm and dry. Capillary Refill: Capillary refill takes less than 2 seconds. Neurological:      Mental Status: She is alert.    Psychiatric:         Mood and Affect: Mood normal.         Vital Signs  ED Triage Vitals [08/01/23 0332]   Temperature Pulse Respirations Blood Pressure SpO2   98.4 °F (36.9 °C) 81 20 125/60 100 %      Temp Source Heart Rate Source Patient Position - Orthostatic VS BP Location FiO2 (%)   Oral Monitor Lying Right arm --      Pain Score       10 - Worst Possible Pain           Vitals:    08/01/23 0332 08/01/23 0400   BP: 125/60 150/76   Pulse: 81 75   Patient Position - Orthostatic VS: Lying Lying         Visual Acuity      ED Medications  Medications   morphine injection 4 mg (4 mg Intravenous Given 8/1/23 0352)   sodium chloride 0.9 % bolus 1,000 mL (0 mL Intravenous Stopped 8/1/23 0525)   iohexol (OMNIPAQUE) 350 MG/ML injection (SINGLE-DOSE) 100 mL (100 mL Intravenous Given 8/1/23 0458)   iohexol (OMNIPAQUE) 240 MG/ML solution 5 mL (5 mL Oral Given 8/1/23 0458)       Diagnostic Studies  Results Reviewed     Procedure Component Value Units Date/Time    HS Troponin I 2hr [[id number]] Collected: 08/01/23 0625    Lab Status: In process Specimen: Blood from Arm, Left Updated: 08/01/23 0629    Lactic acid, plasma (w/reflex if result > 2.0) [[id number]]  (Normal) Collected: 08/01/23 0429    Lab Status: Final result Specimen: Blood from Arm, Left Updated: 08/01/23 0453     LACTIC ACID 0.8 mmol/L     Narrative:      Result may be elevated if tourniquet was used during collection.     HS Troponin 0hr (reflex protocol) [[id number]]  (Normal) Collected: 08/01/23 0348    Lab Status: Final result Specimen: Blood from Arm, Left Updated: 08/01/23 0420     hs TnI 0hr 3 ng/L     HS Troponin I 4hr [[id number]]     Lab Status: No result Specimen: Blood     POCT pregnancy, urine [[id number]]     Lab Status: No result     Comprehensive metabolic panel [[id number]]  (Abnormal) Collected: 08/01/23 0348    Lab Status: Final result Specimen: Blood from Arm, Left Updated: 08/01/23 0412     Sodium 135 mmol/L      Potassium 3.5 mmol/L      Chloride 103 mmol/L      CO2 18 mmol/L      ANION GAP 14 mmol/L      BUN 9 mg/dL      Creatinine 1.06 mg/dL      Glucose 106 mg/dL      Calcium 9.2 mg/dL AST 48 U/L      ALT 73 U/L      Alkaline Phosphatase 55 U/L      Total Protein 7.5 g/dL      Albumin 3.9 g/dL      Total Bilirubin 0.67 mg/dL      eGFR 66 ml/min/1.73sq m     Narrative:      [facility] guidelines for Chronic Kidney Disease (CKD):   •  Stage 1 with normal or high GFR (GFR > 90 mL/min/1.73 square meters)  •  Stage 2 Mild CKD (GFR = 60-89 mL/min/1.73 square meters)  •  Stage 3A Moderate CKD (GFR = 45-59 mL/min/1.73 square meters)  •  Stage 3B Moderate CKD (GFR = 30-44 mL/min/1.73 square meters)  •  Stage 4 Severe CKD (GFR = 15-29 mL/min/1.73 square meters)  •  Stage 5 End Stage CKD (GFR <15 mL/min/1.73 square meters)  Note: GFR calculation is accurate only with a steady state creatinine    Lipase [[id number]]  (Abnormal) Collected: 08/01/23 0348    Lab Status: Final result Specimen: Blood from Arm, Left Updated: 08/01/23 0412     Lipase 219 u/L     Protime-INR [[id number]]  (Normal) Collected: 08/01/23 0348    Lab Status: Final result Specimen: Blood from Arm, Left Updated: 08/01/23 0408     Protime 13.7 seconds      INR 1.05    APTT [[id number]]  (Normal) Collected: 08/01/23 0348    Lab Status: Final result Specimen: Blood from Arm, Left Updated: 08/01/23 0408     PTT 28 seconds     CBC and differential [[id number]]  (Abnormal) Collected: 08/01/23 0348    Lab Status: Final result Specimen: Blood from Arm, Left Updated: 08/01/23 0354     WBC 5.24 Thousand/uL      RBC 4.61 Million/uL      Hemoglobin 14.1 g/dL      Hematocrit 41.6 %      MCV 90 fL      MCH 30.6 pg      MCHC 33.9 g/dL      RDW 12.5 %      MPV 11.0 fL      Platelets 643 Thousands/uL      nRBC 0 /100 WBCs      Neutrophils Relative 41 %      Immat GRANS % 0 %      Lymphocytes Relative 44 %      Monocytes Relative 11 %      Eosinophils Relative 4 %      Basophils Relative 0 %      Neutrophils Absolute 2.17 Thousands/µL      Immature Grans Absolute 0.02 Thousand/uL      Lymphocytes Absolute 2.21 Thousands/µL Monocytes Absolute 0.59 Thousand/µL      Eosinophils Absolute 0.23 Thousand/µL      Basophils Absolute 0.02 Thousands/µL                  PE Study with CT abdomen & pelvis with contrast   Final Result by López Marie DO (08/01 0246)      No pulmonary embolus or other acute abnormality. Leiomyomatous uterus. Workstation performed: [id number]                    Procedures  Procedures         ED Course  ED Course as of 08/01/23 0631   Tue Aug 01, 2023   0358 WBC: 5.24   0358 Hemoglobin: 14.1   0418 AST(!): 48   0418 ALT(!): 73   0418 Lipase(!): 219   0507 LACTIC ACID: 0.8   0512 PE Study with CT abdomen & pelvis with contrast  IMPRESSION:     No pulmonary embolus or other acute abnormality.     Leiomyomatous uterus.         0615 TT sent to Bariatric surgery    0630 Per Bariatrics, bariatric AP will see patient at bedside. Care turned over to [facility] pending bariatrics input. Findings (including incidental) and plan discussed with patient who verbalized understanding and agreement. EKG normal sinus rhythm at 80 bpm, left axis deviation, ST inversion in 3, poor R wave progression, , QTc 426, no ST elevation or depression as interpreted by me     EKG normal sinus rhythm at 75 bpm, limited by artifact, , QTc 444, no significant change as interpreted by me     SBIRT 20yo+    Flowsheet Row Most Recent Value   Initial Alcohol Screen: US AUDIT-C     1. How often do you have a drink containing alcohol? 0 Filed at: 08/01/2023 0331   2. How many drinks containing alcohol do you have on a typical day you are drinking? 0 Filed at: 08/01/2023 0331   3a. Male UNDER 65: How often do you have five or more drinks on one occasion? 0 Filed at: 08/01/2023 0331   3b. FEMALE Any Age, or MALE 65+: How often do you have 4 or more drinks on one occassion? 0 Filed at: 08/01/2023 0331   Audit-C Score 0 Filed at: 08/01/2023 5387   KELLY: How many times in the past year have you. ..     Used an illegal drug or used a prescription medication for non-medical reasons? Never Filed at: 08/01/2023 9498          Medical Decision Making  DDx including but not limited to: Surgical complication such as abscess/seroma, gastroenteritis, gastritis, PUD, GERD, gastroparesis, hepatitis, pancreatitis, colitis, enteritis, food poisoning, ileus, bowel obstruction, intussusception, volvulus, cholecystitis, biliary colic, choledocholithiasis, perforated viscus, splenic etiology, constipation    Will obtain EKG, troponin to evaluate for ACS. Will obtain CBC to evaluate for leukocytosis, anemia. Will obtain CMP to evaluate kidney function, for electrolyte disturbance. Will obtain CTA PE, as well as abdomen and pelvis    Mild AST, ALT, lipase elevation. Suspect mild pancreatitis. CT abdomen and pelvis unremarkable. Labs otherwise unremarkable. Delta troponin pending. Abdominal pain: acute illness or injury  Pancreatitis: acute illness or injury  Amount and/or Complexity of Data Reviewed  External Data Reviewed: notes. Labs: ordered. Decision-making details documented in ED Course. Radiology: ordered. Decision-making details documented in ED Course. ECG/medicine tests: ordered and independent interpretation performed. Decision-making details documented in ED Course. Risk  Prescription drug management. Disposition  Final diagnoses:   Abdominal pain   Pancreatitis     Time reflects when diagnosis was documented in both MDM as applicable and the Disposition within this note     Time User Action Codes Description Comment    8/1/2023  6:30 AM [place] Amas Add [R10.9] Abdominal pain     8/1/2023  6:30 AM Lizbet Amas Add [K85.90] Pancreatitis       ED Disposition     None      Follow-up Information    None         Patient's Medications   Discharge Prescriptions    No medications on file       No discharge procedures on file.     PDMP Review       Value Time User    PDMP Reviewed  Yes 7/10/2023  1:11 PM Mann Moore JUNI Gomes          ED Provider  Electronically Signed by           Jodene Lombard, PA-C  08/01/23 2238

## 2023-08-01 NOTE — ED CARE HANDOFF
Emergency Department Sign Out Note        Sign out and transfer of care from [facility]. See Separate Emergency Department note. The patient, Filomena Kimbrough, was evaluated by the previous provider for epigastric and chest pain that radiates to her back that woke her up from her sleep. She recently had a gastric sleeve performed by Dr. Mirna Pastor on 7/24. Workup Completed:  Troponin  Lactic  Urine pregnancy  CMP  Lipase  PT/INR  PTT  CBC  PE study   EKG  Bariatrics consulted    ED Course / Workup Pending (followup):  0630 - Per sign out, waiting for bariatrics to see patient and decide disposition. 0800 Delta 2hr hsTnI: 2    0801 Patient seen by Bariatric fellow. Per Barrera Patiño from [facility], they recommend getting a RUQ ultrasound. If US is normal and tolerating liquids with improved pain, patient can be discharged. If abnormal, consult general surgery. 0946 No changes on delta EKG. No STEMI.    5975 Patient reporting improvement in symptoms. Waiting for US. [address] right upper quadrant  Mild hepatic steatosis.     Top normal common bile duct measuring up to 6 mm, tapering to normal distally without choledocholithiasis, likely within normal limits. 1048 Will PO challenge patient. 1222 Patient tolerating PO. Patient states pain has been alleviated. Will discharge. Patient agreeable. The management plan was discussed in detail with the patient at bedside and all questions were answered. Prior to discharge, we provided both verbal and written instructions. We discussed with the patient the signs and symptoms for which to return to the emergency department. All questions were answered and patient was comfortable with the plan of care and discharged to home. Instructed the patient to follow up with the primary care provider and/or specialist provided and their written instructions.   The patient verbalized understanding of our discussion and plan of care, and agrees to return to the Emergency Department for concerns and progression of illness. At discharge, I instructed the patient to:  -follow up with pcp  -take Zofran and Percocet as prescribed  -rest and drink plenty of fluids  -follow a clear liquid diet for a few days and then slowly advance your diet  -return to the ER if symptoms worsened or new symptoms arose  Patient agreed to this plan and was stable at time of discharge. ED Course as of 08/01/23 1319   Tue Aug 01, 2023   0800 Delta 2hr hsTnI: 2   0801 Per Sheryl Hull from [facility], they recommend getting a RUQ ultrasound. If US is normal and tolerating liquids with improved pain, patient can be discharged. If abnormal, consult general surgery. 0946 No changes on delta EKG. No STEMI.   4934 Patient reporting improvement in symptoms. Waiting for US. [address] right upper quadrant  Mild hepatic steatosis.     Top normal common bile duct measuring up to 6 mm, tapering to normal distally without choledocholithiasis, likely within normal limits. 1048 Will PO challenge patient. 1222 Patient tolerating PO. Will discharge. Procedures  Medical Decision Making  Abdominal pain: acute illness or injury  Hepatic steatosis: chronic illness or injury  Leiomyoma of uterus: chronic illness or injury  Pancreatitis: acute illness or injury  Renal cyst: chronic illness or injury  Transaminitis: acute illness or injury  Amount and/or Complexity of Data Reviewed  Independent Historian:      Details: Patient is historian  Labs: ordered. Decision-making details documented in ED Course. Radiology: ordered. Decision-making details documented in ED Course. ECG/medicine tests: ordered and independent interpretation performed. Risk  Prescription drug management.               Disposition  Final diagnoses:   Abdominal pain   Pancreatitis   Transaminitis   Hepatic steatosis   Renal cyst   Leiomyoma of uterus     Time reflects when diagnosis was documented in both MDM as applicable and the Disposition within this note     Time User Action Codes Description Comment    8/1/2023  6:30 AM Alonna Graver Add [R10.9] Abdominal pain     8/1/2023  6:30 AM Alonna Graver Add [K85.90] Pancreatitis     8/1/2023 11:17 AM Juan Pablo Cordia A Add [R74.01] Transaminitis     8/1/2023 11:17 AM Juan Pablo Cordia A Add [K76.0] Hepatic steatosis     8/1/2023 11:17 AM Juan Pablo Cordia A Add [N28.1] Renal cyst     8/1/2023 11:17 AM Juan Pablo Cordia A Add [D25.9] Leiomyoma of uterus       ED Disposition     ED Disposition   Discharge    Condition   Stable    Date/Time   Tue Aug 1, 2023 12:19 PM    Comment   Jesus Madrigal discharge to home/self care.                Follow-up Information     Follow up With Specialties Details Why Contact Info Additional Information    [facility], [address] [place] [address] Nurse Practitioner, Family Medicine Schedule an appointment as soon as possible for a visit   [address] [address]  [phone]       [facility] Schedule an appointment as soon as possible for a visit   [address] [place] [address]  [address] [place] [address] [facility], [address] [place] [address], [address] [place] [address], [phone]        Discharge Medication List as of 8/1/2023 12:22 PM      START taking these medications    Details   ondansetron (ZOFRAN-ODT) 4 mg disintegrating tablet Take 1 tablet (4 mg total) by mouth every 8 (eight) hours as needed for nausea or vomiting, Starting Tue 8/1/2023, Normal      oxyCODONE-acetaminophen (PERCOCET) 5-325 mg per tablet Take 1 tablet by mouth every 4 (four) hours as needed for moderate pain Max Daily Amount: 6 tablets, Starting Tue 8/1/2023, Normal         CONTINUE these medications which have NOT CHANGED    Details   acetaminophen (TYLENOL) 160 mg/5 mL suspension Take 30.4 mL (975 mg total) by mouth every 8 (eight) hours for 7 days, Starting Tue 7/25/2023, Until Tue 8/1/2023, Normal      Blood Glucose Monitoring Suppl (OneTouch Verio Flex System) w/Device KIT TEST EVERY MORNING, Normal      Blood Pressure Monitoring (Adult Blood Pressure Cuff Lg) KIT Use in the morning, Starting Mon 11/21/2022, Normal      cetirizine (ZyrTEC) 10 mg tablet Take 1 tablet (10 mg total) by mouth in the morning., Starting Thu 5/19/2022, Normal      cholecalciferol (VITAMIN D3) 400 units tablet Take 2,000 Units by mouth daily, Historical Med      enoxaparin (LOVENOX) 40 mg/0.4 mL Inject 0.4 mL (40 mg total) under the skin daily for 13 days Do not start before July 25, 2023., Starting Tue 7/25/2023, Until Mon 8/7/2023, Normal      Insulin Pen Needle 30G X 8 MM MISC Use once a week, Starting Sat 2/4/2023, Normal      loratadine (CLARITIN) 10 mg tablet Take 10 mg by mouth daily As needed, Historical Med      Multiple Vitamins-Minerals (multivitamin with minerals) tablet Take 1 tablet by mouth daily, Historical Med      omeprazole (PriLOSEC) 20 mg delayed release capsule Take 1 capsule (20 mg total) by mouth daily Do not start before July 25, 2023., Starting Tue 7/25/2023, Normal      OneTouch Delica Lancets 32R MISC Check blood sugars twice daily. Please substitute with appropriate alternative as covered by patient's insurance. Dx: E11.65, Normal      OneTouch Verio test strip Use 1 each in the morning Use as instructed, Starting Mon 1/9/2023, Normal      oxyCODONE (Roxicodone) 5 immediate release tablet Take 1 tablet (5 mg total) by mouth every 4 (four) hours as needed for moderate pain Max Daily Amount: 30 mg Do not start before July 25, 2023., Starting Tue 7/25/2023, Normal      Trulicity 1.5 XY/7.3SP injection ADMINISTER 1.5 MG UNDER THE SKIN EVERY 7 DAYS, Normal           No discharge procedures on file.        ED Provider  Electronically Signed by     Grey Kehr, PA-C  08/01/23 5657

## 2023-08-01 NOTE — DISCHARGE INSTRUCTIONS
DISCHARGE INSTRUCTIONS:    FOLLOW UP WITH YOUR PRIMARY CARE PROVIDER OR THE [address]. MAKE AN APPOINTMENT TO BE SEEN. TAKE MEDICATION AS PRESCRIBED. IF RASH, SHORTNESS OF BREATH OR TROUBLE SWALLOWING, STOP TAKING THE MEDICATION AND BE SEEN. START WITH A CLEAR LIQUID DIET FOR A FEW DAYS AND SLOWLY ADVANCE YOUR DIET THEN TO SMALL LOW FAT MEALS. REST AND DRINK PLENTY OF FLUIDS. IF SYMPTOMS WORSEN OR NEW SYMPTOMS ARISE, RETURN TO THE ER TO BE SEEN.

## 2023-08-03 ENCOUNTER — OFFICE VISIT (OUTPATIENT)
Dept: BARIATRICS | Facility: CLINIC | Age: 38
End: 2023-08-03

## 2023-08-03 VITALS
HEIGHT: 61 IN | WEIGHT: 241.5 LBS | TEMPERATURE: 98.5 F | SYSTOLIC BLOOD PRESSURE: 124 MMHG | DIASTOLIC BLOOD PRESSURE: 88 MMHG | BODY MASS INDEX: 45.59 KG/M2 | HEART RATE: 75 BPM

## 2023-08-03 DIAGNOSIS — E66.01 CLASS 3 SEVERE OBESITY DUE TO EXCESS CALORIES WITH SERIOUS COMORBIDITY AND BODY MASS INDEX (BMI) OF 45.0 TO 49.9 IN ADULT (HCC): Primary | ICD-10-CM

## 2023-08-03 DIAGNOSIS — Z98.84 BARIATRIC SURGERY STATUS: ICD-10-CM

## 2023-08-03 DIAGNOSIS — E11.9 TYPE 2 DIABETES MELLITUS WITHOUT COMPLICATION, WITHOUT LONG-TERM CURRENT USE OF INSULIN (HCC): ICD-10-CM

## 2023-08-03 DIAGNOSIS — E66.01 OBESITY, CLASS III, BMI 40-49.9 (MORBID OBESITY) (HCC): Primary | ICD-10-CM

## 2023-08-03 DIAGNOSIS — K21.9 GASTROESOPHAGEAL REFLUX DISEASE WITHOUT ESOPHAGITIS: ICD-10-CM

## 2023-08-03 PROCEDURE — RECHECK: Performed by: DIETITIAN, REGISTERED

## 2023-08-03 PROCEDURE — 99024 POSTOP FOLLOW-UP VISIT: CPT | Performed by: SURGERY

## 2023-08-03 RX ORDER — ACETAMINOPHEN 160 MG/5ML
SUSPENSION ORAL
Status: ON HOLD | COMMUNITY
Start: 2023-07-25

## 2023-08-03 NOTE — PROGRESS NOTES
Weight Management Nutrition Class     Diagnosis: Morbid Obesity    Bariatric Surgeon: Dr. Cynthia Serrano    Surgery: Vertical Sleeve Gastrectomy    Class: first post op note    Topics discussed today include:     fluid goals post op, protein goals post op, constipation, chew food well, exercise, avoidance of alcohol, PPI use, diet progression, hypoglycemia, dumping syndrome, protein supplems, vitamin/mineral supplements and calcium supplements    Patient was able to verbalize basic diet (protein, fluid, vitamin and mineral) recommendations and possible nutrition-related complications.  Yes

## 2023-08-03 NOTE — PROGRESS NOTES
POST OP UP VISIT - BARIATRIC SURGERY  Dick Steve 43 y.o. female MRN: [id number]  Unit/Bed#:  Encounter: [id number]      HPI:  Dick Steve is a 43 y.o. female status post Robotic sleeve gastrectomy performed by Dr. Jose Mccoy on 7/24/23 returning to office for first post op visit since surgery. Seen in ED on 8/1/23 with complaints of epigastric and chest pain. RUQ ultrasound obtained and normal in findings other than mild hepatic steatosis. Patient discharged on zofran. Tolerating diet. Denies nausea and vomiting. Not taking multivitamins daily yet but is taking PPI daily. Administering lovenox injections. Feeling much better. Review of Systems   Constitutional: Negative for chills and fever. HENT: Negative for ear pain and sore throat. Eyes: Negative for pain and visual disturbance. Respiratory: Negative for cough and shortness of breath. Cardiovascular: Negative for chest pain and palpitations. Gastrointestinal: Negative for abdominal pain and vomiting. Genitourinary: Negative for dysuria and hematuria. Musculoskeletal: Negative for arthralgias and back pain. Skin: Negative for color change and rash. Neurological: Negative for seizures and syncope. All other systems reviewed and are negative.       Historical Information   Past Medical History:   Diagnosis Date   • Diabetes mellitus ([address])    • Hypertension    • Obesity    • Periodontal disease    • Risk factors for obstructive sleep apnea 08/08/2022   • Seasonal allergies      Past Surgical History:   Procedure Laterality Date   • CATARACT EXTRACTION Left 06/08/2022   • CATARACT EXTRACTION Right 11/10/2021   • COLONOSCOPY  2013   • PA LAPS GSTRC RSTRICTIV PX LONGITUDINAL GASTRECTOMY N/A 7/24/2023    Procedure: GASTRECTOMY SLEEVE W/ ROBOT & INTRAOPERATIVE EGD;  Surgeon: Robbie Jenkins MD;  Location: [facility] OR;  Service: Bariatrics     Social History   Social History     Substance and Sexual Activity   Alcohol Use Never Social History     Substance and Sexual Activity   Drug Use Never     Social History     Tobacco Use   Smoking Status Never   Smokeless Tobacco Never     Family History: non-contributory    Meds/Allergies   all medications and allergies reviewed  No Known Allergies    Objective       Current Vitals:   Blood Pressure: 124/88 (08/03/23 1456)  Pulse: 75 (08/03/23 1456)  Temperature: 98.5 °F (36.9 °C) (08/03/23 1456)  Temp Source: Tympanic (08/03/23 1456)  Height: 5' 1" (154.9 cm) (08/03/23 1456)  Weight - Scale: 110 kg (241 lb 8 oz) (08/03/23 1456)      Invasive Devices     None                 Physical Exam  Constitutional:       Appearance: Normal appearance. She is obese. HENT:      Head: Normocephalic and atraumatic. Nose: Nose normal.      Mouth/Throat:      Mouth: Mucous membranes are moist.   Eyes:      Extraocular Movements: Extraocular movements intact. Pupils: Pupils are equal, round, and reactive to light. Cardiovascular:      Rate and Rhythm: Normal rate and regular rhythm. Pulses: Normal pulses. Heart sounds: Normal heart sounds. Pulmonary:      Effort: Pulmonary effort is normal.      Breath sounds: Normal breath sounds. Abdominal:      Palpations: Abdomen is soft. Comments: Incisions healing well with no signs of infection or drainage   Musculoskeletal:      Cervical back: Normal range of motion. Skin:     General: Skin is warm. Neurological:      General: No focal deficit present. Mental Status: She is alert and oriented to person, place, and time.    Psychiatric:         Mood and Affect: Mood normal.         Behavior: Behavior normal.         RIGHT UPPER QUADRANT ULTRASOUND     INDICATION:     per bariatrics request.     COMPARISON: CTA chest/abdomen/pelvis 8/1/2023     TECHNIQUE:   Real-time ultrasound of the right upper quadrant was performed with a curvilinear transducer with both volumetric sweeps and still imaging techniques.     FINDINGS:     PANCREAS: Portions of the pancreas are obscured by bowel gas. Visualized portions of the pancreas are unremarkable.     AORTA AND IVC:  Visualized portions are normal for patient age.     LIVER:  Size:  Within normal range. The liver measures 12.4 cm in the midclavicular line. Contour:  Surface contour is smooth. Parenchyma: There is mild diffuse increased echogenicity with smooth echotexture, without significant beam attenuation or loss of periportal echogenicity. Most consistent with mild hepatic steatosis. No liver mass identified. Limited imaging of the main portal vein shows it to be patent and hepatopetal.     BILIARY:  No gallbladder findings. No intrahepatic biliary dilatation. CBD measures 6.0 mm. No choledocholithiasis.     KIDNEY:  Right kidney measures 10.0 x 4.6 x 5.2 cm. Volume 125.3 mL  Kidney within normal limits.     ASCITES:   None.     IMPRESSION:     Mild hepatic steatosis.     Top normal common bile duct measuring up to 6 mm, tapering to normal distally without choledocholithiasis, likely within normal limits.     Workstation performed: [id number]      Assessment/PLAN:    43 y.o. female status post Robotic sleeve gastrectomy done on 7/24/23 by Dr. Annamarie Prasad, doing well post op. No major issues and healing well. The pathology report was reviewed with the patient and the results were within normal limits. Pathology, and Other Studies: I have personally reviewed pertinent reports. Lab Results   Component Value Date    FINALDX  07/24/2023     A. Stomach, sleeve gastrectomy:  -Portion of benign stomach without significant pathologic alteration.  -Negative for significant acute or chronic inflammation, intestinal metaplasia, dysplasia or carcinoma. Increase physical activity slowly as tolerated and instructed. Advance diet as instructed by our dietitians today and as indicated in the binder. Continue Lovenox prophylaxis until completed.    Continue PPI    Follow up in one month as scheduled.         Ryan Doe PA-C  Bariatric Surgery   8/3/2023  3:11 PM      .

## 2023-08-13 ENCOUNTER — HOSPITAL ENCOUNTER (INPATIENT)
Facility: HOSPITAL | Age: 38
LOS: 1 days | Discharge: HOME | End: 2023-08-15
Attending: EMERGENCY MEDICINE | Admitting: INTERNAL MEDICINE
Payer: MEDICARE

## 2023-08-13 DIAGNOSIS — K59.00 CONSTIPATION: ICD-10-CM

## 2023-08-13 DIAGNOSIS — K85.90 PANCREATITIS: Primary | ICD-10-CM

## 2023-08-13 DIAGNOSIS — K85.90 ACUTE PANCREATITIS: ICD-10-CM

## 2023-08-13 PROCEDURE — 85025 COMPLETE CBC W/AUTO DIFF WBC: CPT | Performed by: EMERGENCY MEDICINE

## 2023-08-13 PROCEDURE — 96374 THER/PROPH/DIAG INJ IV PUSH: CPT

## 2023-08-13 PROCEDURE — 84484 ASSAY OF TROPONIN QUANT: CPT | Performed by: EMERGENCY MEDICINE

## 2023-08-13 PROCEDURE — NC001 PR NO CHARGE: Performed by: EMERGENCY MEDICINE

## 2023-08-13 PROCEDURE — 80053 COMPREHEN METABOLIC PANEL: CPT | Performed by: EMERGENCY MEDICINE

## 2023-08-13 PROCEDURE — 36415 COLL VENOUS BLD VENIPUNCTURE: CPT | Performed by: EMERGENCY MEDICINE

## 2023-08-13 PROCEDURE — 99285 EMERGENCY DEPT VISIT HI MDM: CPT | Performed by: EMERGENCY MEDICINE

## 2023-08-13 PROCEDURE — 99285 EMERGENCY DEPT VISIT HI MDM: CPT

## 2023-08-13 PROCEDURE — 93005 ELECTROCARDIOGRAM TRACING: CPT

## 2023-08-13 PROCEDURE — 83690 ASSAY OF LIPASE: CPT | Performed by: EMERGENCY MEDICINE

## 2023-08-13 PROCEDURE — 96375 TX/PRO/DX INJ NEW DRUG ADDON: CPT

## 2023-08-13 RX ORDER — FAMOTIDINE 10 MG/ML
20 INJECTION, SOLUTION INTRAVENOUS ONCE
Status: COMPLETED | OUTPATIENT
Start: 2023-08-13 | End: 2023-08-13

## 2023-08-13 RX ORDER — MORPHINE SULFATE 4 MG/ML
4 INJECTION, SOLUTION INTRAMUSCULAR; INTRAVENOUS ONCE
Status: COMPLETED | OUTPATIENT
Start: 2023-08-13 | End: 2023-08-13

## 2023-08-13 RX ADMIN — FAMOTIDINE 20 MG: 10 INJECTION INTRAVENOUS at 23:43

## 2023-08-13 RX ADMIN — MORPHINE SULFATE 4 MG: 4 INJECTION INTRAVENOUS at 23:43

## 2023-08-14 ENCOUNTER — APPOINTMENT (EMERGENCY)
Dept: CT IMAGING | Facility: HOSPITAL | Age: 38
End: 2023-08-14
Payer: MEDICARE

## 2023-08-14 ENCOUNTER — APPOINTMENT (INPATIENT)
Dept: ULTRASOUND IMAGING | Facility: HOSPITAL | Age: 38
End: 2023-08-14
Payer: MEDICARE

## 2023-08-14 PROBLEM — R74.01 TRANSAMINITIS: Status: ACTIVE | Noted: 2023-08-14

## 2023-08-14 PROBLEM — K85.90 ACUTE PANCREATITIS: Status: ACTIVE | Noted: 2023-08-14

## 2023-08-14 LAB
ALBUMIN SERPL BCP-MCNC: 3.4 G/DL (ref 3.5–5)
ALBUMIN SERPL BCP-MCNC: 3.8 G/DL (ref 3.5–5)
ALP SERPL-CCNC: 43 U/L (ref 34–104)
ALP SERPL-CCNC: 47 U/L (ref 34–104)
ALT SERPL W P-5'-P-CCNC: 101 U/L (ref 7–52)
ALT SERPL W P-5'-P-CCNC: 99 U/L (ref 7–52)
ANION GAP SERPL CALCULATED.3IONS-SCNC: 6 MMOL/L
ANION GAP SERPL CALCULATED.3IONS-SCNC: 8 MMOL/L
AST SERPL W P-5'-P-CCNC: 109 U/L (ref 13–39)
AST SERPL W P-5'-P-CCNC: 149 U/L (ref 13–39)
ATRIAL RATE: 75 BPM
ATRIAL RATE: 78 BPM
BASOPHILS # BLD AUTO: 0.01 THOUSANDS/ÂΜL (ref 0–0.1)
BASOPHILS # BLD AUTO: 0.01 THOUSANDS/ÂΜL (ref 0–0.1)
BASOPHILS NFR BLD AUTO: 0 % (ref 0–1)
BASOPHILS NFR BLD AUTO: 0 % (ref 0–1)
BILIRUB SERPL-MCNC: 0.57 MG/DL (ref 0.2–1)
BILIRUB SERPL-MCNC: 0.68 MG/DL (ref 0.2–1)
BUN SERPL-MCNC: 10 MG/DL (ref 5–25)
BUN SERPL-MCNC: 7 MG/DL (ref 5–25)
CALCIUM ALBUM COR SERPL-MCNC: 9 MG/DL (ref 8.3–10.1)
CALCIUM SERPL-MCNC: 8.5 MG/DL (ref 8.4–10.2)
CALCIUM SERPL-MCNC: 9 MG/DL (ref 8.4–10.2)
CARDIAC TROPONIN I PNL SERPL HS: 5 NG/L
CHLORIDE SERPL-SCNC: 107 MMOL/L (ref 96–108)
CHLORIDE SERPL-SCNC: 108 MMOL/L (ref 96–108)
CHOLEST SERPL-MCNC: 99 MG/DL
CO2 SERPL-SCNC: 22 MMOL/L (ref 21–32)
CO2 SERPL-SCNC: 23 MMOL/L (ref 21–32)
CREAT SERPL-MCNC: 0.75 MG/DL (ref 0.6–1.3)
CREAT SERPL-MCNC: 0.83 MG/DL (ref 0.6–1.3)
EOSINOPHIL # BLD AUTO: 0.1 THOUSAND/ÂΜL (ref 0–0.61)
EOSINOPHIL # BLD AUTO: 0.11 THOUSAND/ÂΜL (ref 0–0.61)
EOSINOPHIL NFR BLD AUTO: 2 % (ref 0–6)
EOSINOPHIL NFR BLD AUTO: 2 % (ref 0–6)
ERYTHROCYTE [DISTWIDTH] IN BLOOD BY AUTOMATED COUNT: 12.7 % (ref 11.6–15.1)
ERYTHROCYTE [DISTWIDTH] IN BLOOD BY AUTOMATED COUNT: 12.8 % (ref 11.6–15.1)
GFR SERPL CREATININE-BSD FRML MDRD: 100 ML/MIN/1.73SQ M
GFR SERPL CREATININE-BSD FRML MDRD: 89 ML/MIN/1.73SQ M
GLUCOSE SERPL-MCNC: 102 MG/DL (ref 65–140)
GLUCOSE SERPL-MCNC: 111 MG/DL (ref 65–140)
HCT VFR BLD AUTO: 37.1 % (ref 34.8–46.1)
HCT VFR BLD AUTO: 40.6 % (ref 34.8–46.1)
HDLC SERPL-MCNC: 29 MG/DL
HGB BLD-MCNC: 12.9 G/DL (ref 11.5–15.4)
HGB BLD-MCNC: 13.7 G/DL (ref 11.5–15.4)
IMM GRANULOCYTES # BLD AUTO: 0.01 THOUSAND/UL (ref 0–0.2)
IMM GRANULOCYTES # BLD AUTO: 0.02 THOUSAND/UL (ref 0–0.2)
IMM GRANULOCYTES NFR BLD AUTO: 0 % (ref 0–2)
IMM GRANULOCYTES NFR BLD AUTO: 0 % (ref 0–2)
LDLC SERPL CALC-MCNC: 56 MG/DL (ref 0–100)
LIPASE SERPL-CCNC: ABNORMAL U/L (ref 11–82)
LYMPHOCYTES # BLD AUTO: 0.97 THOUSANDS/ÂΜL (ref 0.6–4.47)
LYMPHOCYTES # BLD AUTO: 1.22 THOUSANDS/ÂΜL (ref 0.6–4.47)
LYMPHOCYTES NFR BLD AUTO: 21 % (ref 14–44)
LYMPHOCYTES NFR BLD AUTO: 22 % (ref 14–44)
MCH RBC QN AUTO: 30.4 PG (ref 26.8–34.3)
MCH RBC QN AUTO: 31.1 PG (ref 26.8–34.3)
MCHC RBC AUTO-ENTMCNC: 33.7 G/DL (ref 31.4–37.4)
MCHC RBC AUTO-ENTMCNC: 34.8 G/DL (ref 31.4–37.4)
MCV RBC AUTO: 89 FL (ref 82–98)
MCV RBC AUTO: 90 FL (ref 82–98)
MONOCYTES # BLD AUTO: 0.39 THOUSAND/ÂΜL (ref 0.17–1.22)
MONOCYTES # BLD AUTO: 0.52 THOUSAND/ÂΜL (ref 0.17–1.22)
MONOCYTES NFR BLD AUTO: 9 % (ref 4–12)
MONOCYTES NFR BLD AUTO: 9 % (ref 4–12)
NEUTROPHILS # BLD AUTO: 2.95 THOUSANDS/ÂΜL (ref 1.85–7.62)
NEUTROPHILS # BLD AUTO: 3.82 THOUSANDS/ÂΜL (ref 1.85–7.62)
NEUTS SEG NFR BLD AUTO: 67 % (ref 43–75)
NEUTS SEG NFR BLD AUTO: 68 % (ref 43–75)
NRBC BLD AUTO-RTO: 0 /100 WBCS
NRBC BLD AUTO-RTO: 0 /100 WBCS
P AXIS: 32 DEGREES
P AXIS: 34 DEGREES
PLATELET # BLD AUTO: 261 THOUSANDS/UL (ref 149–390)
PLATELET # BLD AUTO: 272 THOUSANDS/UL (ref 149–390)
PMV BLD AUTO: 10.8 FL (ref 8.9–12.7)
PMV BLD AUTO: 11.7 FL (ref 8.9–12.7)
POTASSIUM SERPL-SCNC: 3.2 MMOL/L (ref 3.5–5.3)
POTASSIUM SERPL-SCNC: 3.5 MMOL/L (ref 3.5–5.3)
PR INTERVAL: 156 MS
PR INTERVAL: 160 MS
PROT SERPL-MCNC: 6 G/DL (ref 6.4–8.4)
PROT SERPL-MCNC: 6.9 G/DL (ref 6.4–8.4)
QRS AXIS: -39 DEGREES
QRS AXIS: -47 DEGREES
QRSD INTERVAL: 80 MS
QRSD INTERVAL: 84 MS
QT INTERVAL: 364 MS
QT INTERVAL: 382 MS
QTC INTERVAL: 414 MS
QTC INTERVAL: 426 MS
RBC # BLD AUTO: 4.15 MILLION/UL (ref 3.81–5.12)
RBC # BLD AUTO: 4.51 MILLION/UL (ref 3.81–5.12)
SODIUM SERPL-SCNC: 137 MMOL/L (ref 135–147)
SODIUM SERPL-SCNC: 137 MMOL/L (ref 135–147)
T WAVE AXIS: -4 DEGREES
T WAVE AXIS: -8 DEGREES
TRIGL SERPL-MCNC: 70 MG/DL
VENTRICULAR RATE: 75 BPM
VENTRICULAR RATE: 78 BPM
WBC # BLD AUTO: 4.43 THOUSAND/UL (ref 4.31–10.16)
WBC # BLD AUTO: 5.7 THOUSAND/UL (ref 4.31–10.16)

## 2023-08-14 PROCEDURE — 93010 ELECTROCARDIOGRAM REPORT: CPT | Performed by: STUDENT IN AN ORGANIZED HEALTH CARE EDUCATION/TRAINING PROGRAM

## 2023-08-14 PROCEDURE — 74177 CT ABD & PELVIS W/CONTRAST: CPT

## 2023-08-14 PROCEDURE — 99223 1ST HOSP IP/OBS HIGH 75: CPT | Performed by: INTERNAL MEDICINE

## 2023-08-14 PROCEDURE — 82784 ASSAY IGA/IGD/IGG/IGM EACH: CPT

## 2023-08-14 PROCEDURE — 93005 ELECTROCARDIOGRAM TRACING: CPT

## 2023-08-14 PROCEDURE — NC001 PR NO CHARGE: Performed by: STUDENT IN AN ORGANIZED HEALTH CARE EDUCATION/TRAINING PROGRAM

## 2023-08-14 PROCEDURE — 80053 COMPREHEN METABOLIC PANEL: CPT | Performed by: PHYSICIAN ASSISTANT

## 2023-08-14 PROCEDURE — 82787 IGG 1 2 3 OR 4 EACH: CPT

## 2023-08-14 PROCEDURE — 85025 COMPLETE CBC W/AUTO DIFF WBC: CPT | Performed by: PHYSICIAN ASSISTANT

## 2023-08-14 PROCEDURE — G1004 CDSM NDSC: HCPCS

## 2023-08-14 PROCEDURE — 80061 LIPID PANEL: CPT | Performed by: PHYSICIAN ASSISTANT

## 2023-08-14 PROCEDURE — 76705 ECHO EXAM OF ABDOMEN: CPT

## 2023-08-14 RX ORDER — ONDANSETRON 2 MG/ML
4 INJECTION INTRAMUSCULAR; INTRAVENOUS EVERY 6 HOURS PRN
Status: DISCONTINUED | OUTPATIENT
Start: 2023-08-14 | End: 2023-08-15 | Stop reason: HOSPADM

## 2023-08-14 RX ORDER — MORPHINE SULFATE 4 MG/ML
4 INJECTION, SOLUTION INTRAMUSCULAR; INTRAVENOUS EVERY 4 HOURS PRN
Status: DISCONTINUED | OUTPATIENT
Start: 2023-08-14 | End: 2023-08-15 | Stop reason: HOSPADM

## 2023-08-14 RX ORDER — PANTOPRAZOLE SODIUM 20 MG/1
20 TABLET, DELAYED RELEASE ORAL
Status: DISCONTINUED | OUTPATIENT
Start: 2023-08-14 | End: 2023-08-15 | Stop reason: HOSPADM

## 2023-08-14 RX ORDER — ENOXAPARIN SODIUM 100 MG/ML
40 INJECTION SUBCUTANEOUS DAILY
Status: DISCONTINUED | OUTPATIENT
Start: 2023-08-14 | End: 2023-08-15 | Stop reason: HOSPADM

## 2023-08-14 RX ORDER — OXYCODONE HYDROCHLORIDE 5 MG/1
5 TABLET ORAL EVERY 4 HOURS PRN
Status: DISCONTINUED | OUTPATIENT
Start: 2023-08-14 | End: 2023-08-15 | Stop reason: HOSPADM

## 2023-08-14 RX ORDER — SODIUM CHLORIDE, SODIUM GLUCONATE, SODIUM ACETATE, POTASSIUM CHLORIDE, MAGNESIUM CHLORIDE, SODIUM PHOSPHATE, DIBASIC, AND POTASSIUM PHOSPHATE .53; .5; .37; .037; .03; .012; .00082 G/100ML; G/100ML; G/100ML; G/100ML; G/100ML; G/100ML; G/100ML
75 INJECTION, SOLUTION INTRAVENOUS CONTINUOUS
Status: DISCONTINUED | OUTPATIENT
Start: 2023-08-14 | End: 2023-08-15 | Stop reason: HOSPADM

## 2023-08-14 RX ADMIN — SODIUM CHLORIDE, SODIUM GLUCONATE, SODIUM ACETATE, POTASSIUM CHLORIDE AND MAGNESIUM CHLORIDE 125 ML/HR: 526; 502; 368; 37; 30 INJECTION, SOLUTION INTRAVENOUS at 07:15

## 2023-08-14 RX ADMIN — IOHEXOL 100 ML: 350 INJECTION, SOLUTION INTRAVENOUS at 02:09

## 2023-08-14 RX ADMIN — ENOXAPARIN SODIUM 40 MG: 100 INJECTION SUBCUTANEOUS at 08:00

## 2023-08-14 RX ADMIN — PANTOPRAZOLE SODIUM 20 MG: 20 TABLET, DELAYED RELEASE ORAL at 07:15

## 2023-08-14 RX ADMIN — OXYCODONE HYDROCHLORIDE 5 MG: 5 TABLET ORAL at 20:51

## 2023-08-14 NOTE — ASSESSMENT & PLAN NOTE
Unclear etiology. Possibly due to gb stone. R/o trigs in nondrinker. Lipase >9000.  Ct a/p w/peripancreatic inflammation w/o necrosis or pseudocyst  Us ruq, npo ivf consult gi, lipid panel

## 2023-08-14 NOTE — PLAN OF CARE
Problem: Potential for Falls  Goal: Patient will remain free of falls  Description: INTERVENTIONS:  - Educate patient/family on patient safety including physical limitations  - Instruct patient to call for assistance with activity   - Consult OT/PT to assist with strengthening/mobility   - Keep Call bell within reach  - Keep bed low and locked with side rails adjusted as appropriate  - Keep care items and personal belongings within reach  - Initiate and maintain comfort rounds  - Make Fall Risk Sign visible to staff  - Offer Toileting every  Hours, in advance of need  - Initiate/Maintain alarm  - Obtain necessary fall risk management equipment:   - Apply yellow socks and bracelet for high fall risk patients  - Consider moving patient to room near nurses station  Outcome: Progressing     Problem: PAIN - ADULT  Goal: Verbalizes/displays adequate comfort level or baseline comfort level  Description: Interventions:  - Encourage patient to monitor pain and request assistance  - Assess pain using appropriate pain scale  - Administer analgesics based on type and severity of pain and evaluate response  - Implement non-pharmacological measures as appropriate and evaluate response  - Consider cultural and social influences on pain and pain management  - Notify physician/advanced practitioner if interventions unsuccessful or patient reports new pain  Outcome: Progressing     Problem: SAFETY ADULT  Goal: Patient will remain free of falls  Description: INTERVENTIONS:  - Educate patient/family on patient safety including physical limitations  - Instruct patient to call for assistance with activity   - Consult OT/PT to assist with strengthening/mobility   - Keep Call bell within reach  - Keep bed low and locked with side rails adjusted as appropriate  - Keep care items and personal belongings within reach  - Initiate and maintain comfort rounds  - Make Fall Risk Sign visible to staff  - Offer Toileting every Hours, in advance of need  - Initiate/Maintain alarm  - Obtain necessary fall risk management equipment:   - Apply yellow socks and bracelet for high fall risk patients  - Consider moving patient to room near nurses station  Outcome: Progressing  Goal: Maintain or return to baseline ADL function  Description: INTERVENTIONS:  -  Assess patient's ability to carry out ADLs; assess patient's baseline for ADL function and identify physical deficits which impact ability to perform ADLs (bathing, care of mouth/teeth, toileting, grooming, dressing, etc.)  - Assess/evaluate cause of self-care deficits   - Assess range of motion  - Assess patient's mobility; develop plan if impaired  - Assess patient's need for assistive devices and provide as appropriate  - Encourage maximum independence but intervene and supervise when necessary  - Involve family in performance of ADLs  - Assess for home care needs following discharge   - Consider OT consult to assist with ADL evaluation and planning for discharge  - Provide patient education as appropriate  Outcome: Progressing  Goal: Maintains/Returns to pre admission functional level  Description: INTERVENTIONS:  - Perform BMAT or MOVE assessment daily.   - Set and communicate daily mobility goal to care team and patient/family/caregiver. - Collaborate with rehabilitation services on mobility goals if consulted  - Perform Range of Motion  times a day. - Reposition patient every  hours.   - Dangle patient  times a day  - Stand patient  times a day  - Ambulate patient  times a day  - Out of bed to chair  times a day   - Out of bed for cici times a day  - Out of bed for toileting  - Record patient progress and toleration of activity level   Outcome: Progressing     Problem: DISCHARGE PLANNING  Goal: Discharge to home or other facility with appropriate resources  Description: INTERVENTIONS:  - Identify barriers to discharge w/patient and caregiver  - Arrange for needed discharge resources and transportation as appropriate  - Identify discharge learning needs (meds, wound care, etc.)  - Arrange for interpretive services to assist at discharge as needed  - Refer to Case Management Department for coordinating discharge planning if the patient needs post-hospital services based on physician/advanced practitioner order or complex needs related to functional status, cognitive ability, or social support system  Outcome: Progressing     Problem: Knowledge Deficit  Goal: Patient/family/caregiver demonstrates understanding of disease process, treatment plan, medications, and discharge instructions  Description: Complete learning assessment and assess knowledge base.   Interventions:  - Provide teaching at level of understanding  - Provide teaching via preferred learning methods  Outcome: Progressing

## 2023-08-14 NOTE — CASE MANAGEMENT
Case Management Assessment & Discharge Planning Note    Patient name Daya Anders  Location [address] Sierra Jensen* MRN [id number]  : 1985 Date 2023       Current Admission Date: 2023  Current Admission Diagnosis:Acute pancreatitis   Patient Active Problem List    Diagnosis Date Noted   • Acute pancreatitis 2023   • Transaminitis 2023   • MARIA LUISA (obstructive sleep apnea)    • Uveitis 2022   • Periodontitis 2022   • Type 2 diabetes mellitus without complication, without long-term current use of insulin ([address]) 2022   • Allergic rhinitis 2022   • Gastroesophageal reflux disease 2022   • Class 3 severe obesity due to excess calories with serious comorbidity and body mass index (BMI) of 45.0 to 49.9 in adult ([address]) 2022   • Hypertension 2022   • Vaginal discharge 2022   • Abnormal ECG 10/12/2021      LOS (days): 0  Geometric Mean LOS (GMLOS) (days):   Days to GMLOS:     OBJECTIVE:  PATIENT READMITTED TO HOSPITAL  Risk of Unplanned Readmission Score: 9.65         Current admission status: Inpatient       Preferred Pharmacy:   [address], [address] [place] [address]  Phone: [phone] Fax: [phone]    Primary Care Provider: DEVONTE Becerra    Primary Insurance: Samantha FUENTES  Secondary Insurance:     ASSESSMENT:  [facility], [address] [facility] Representative - Friend   Primary Phone: [phone] (Mobile)               Advance Directives  Does patient have a [address]?: No  Was patient offered paperwork?: Yes (declined)  Does patient currently have a Health Care decision maker?: Yes, please see Health Care Proxy section  Does patient have Advance Directives?: No  Was patient offered paperwork?: Yes (declined)  Primary Contact: Shiva Melchor         Readmission Root Cause  30 Day Readmission: Yes  Who directed you to return to the hospital?: Self  Did you understand whom to contact if you had questions or problems?: Yes  Did you get your prescriptions before you left the hospital?: No  Reason[de-identified] Preference for own pharmacy  Were you able to get your prescriptions filled when you left the hospital?: Yes  Did you take your medications as prescribed?: Yes  Were you able to get to your follow-up appointments?: Yes  During previous admission, was a post-acute recommendation made?: No  Patient was readmitted due to: previously admitted undergoing a lap sleeve gastrectomy now admitted with pancreatitis  Action Plan: at this point, no discharge needs identified other than outpatient follow up with physicians    Patient Information  Admitted from[de-identified] Home  Mental Status: Alert  During Assessment patient was accompanied by: Not accompanied during assessment  Assessment information provided by[de-identified] Patient  Primary Caregiver: Self  Support Systems: [place] of Residence: [address] [place] [address] do you live in?: [address] [facility] entry access options. Select all that apply.: Stairs  Number of steps to enter home. : 1  Do the steps have railings?: No  Type of Current Residence: [address] [place] [address], [address] [place] [address] (rents a 1st floor bedroom in an [facility])  Floor Level: 1  Upon entering residence, is there a bedroom on the main floor (no further steps)?: Yes  Upon entering residence, is there a bathroom on the main floor (no further steps)?: Yes  In the last 12 months, was there a time when you were not able to pay the mortgage or rent on time?: No  In the last 12 months, how many places have you lived?: 1  In the last 12 months, was there a time when you did not have a steady place to sleep or slept in a shelter (including now)?: No  Homeless/housing insecurity resource given?: N/A  Living Arrangements: Lives Alone  Is patient a ?: No    Activities of Daily Living Prior to Admission  Functional Status: Independent  Completes ADLs independently?: Yes  Ambulates independently?: Yes  Does patient use assisted devices?: No  Does patient currently own DME?: No  Does patient have a history of Outpatient Therapy (PT/OT)?: No  Does the patient have a history of Short-Term Rehab?: No  Does patient have a history of HHC?: No  Does patient currently have [address]?: No         Patient Information Continued  Income Source: SSI/SSD (receives food stamps as well)  Does patient have prescription coverage?: Yes ([facility] on [address])  Within the past 12 months, you worried that your food would run out before you got the money to buy more.: Never true  Within the past 12 months, the food you bought just didn't last and you didn't have money to get more.: Never true  Food insecurity resource given?: N/A (previously reported as having difficulties at times- denies this admission)  Does patient have a history of substance abuse?: No  Does patient have a history of Mental Health Diagnosis?: No         Means of Transportation  Means of Transport to [facility][de-identified] [address] [facility]  In the past 12 months, has lack of transportation kept you from medical appointments or from getting medications?: No (previously stated having difficulty however denies this admission)  In the past 12 months, has lack of transportation kept you from meetings, work, or from getting things needed for daily living?: No (previously stated having difficulty however denies this admission)  Was application for public transport provided?: N/A        DISCHARGE DETAILS:    Discharge planning discussed with[de-identified] pt        CM contacted family/caregiver?: No- see comments (pt is A&Ox4 and declines need for CM to call as she keeps her updated and has no d/c needs at this time)  Were Treatment Team discharge recommendations reviewed with patient/caregiver?: Yes  Did patient/caregiver verbalize understanding of patient care needs?: Yes       Contacts  Patient Contacts: Shiva Melchor  Relationship to Patient[de-identified] Friend  Contact Method: Phone  Phone Number: [phone] [address]         Is the patient interested in [address] at discharge?: No    DME Referral Provided  Referral made for DME?: No         Would you like to participate in our [address] service program?  : No - Declined    Treatment Team Recommendation: Home  Discharge Destination Plan[de-identified] Home  Transport at Discharge : Auto with designated         Transported by [employer] and Unit #):  Newt Salvia

## 2023-08-14 NOTE — ED CARE HANDOFF
Emergency Department Sign Out Note        Sign out and transfer of care from Dr. Robert Zaman. See Separate Emergency Department note. The patient, Grecia Guidry, was evaluated by the previous provider for Abdominal Pain. Workup Completed:  Labs  Labs Reviewed   COMPREHENSIVE METABOLIC PANEL - Abnormal       Result Value Ref Range Status    Sodium 137  135 - 147 mmol/L Final    Potassium 3.5  3.5 - 5.3 mmol/L Final    Chloride 107  96 - 108 mmol/L Final    CO2 22  21 - 32 mmol/L Final    ANION GAP 8  mmol/L Final    BUN 10  5 - 25 mg/dL Final    Creatinine 0.83  0.60 - 1.30 mg/dL Final    Comment: Standardized to IDMS reference method    Glucose 111  65 - 140 mg/dL Final    Comment: If the patient is fasting, the ADA then defines impaired fasting glucose as > 100 mg/dL and diabetes as > or equal to 123 mg/dL. Calcium 9.0  8.4 - 10.2 mg/dL Final     (*) 13 - 39 U/L Final     (*) 7 - 52 U/L Final    Comment: Specimen collection should occur prior to Sulfasalazine administration due to the potential for falsely depressed results. Alkaline Phosphatase 47  34 - 104 U/L Final    Total Protein 6.9  6.4 - 8.4 g/dL Final    Albumin 3.8  3.5 - 5.0 g/dL Final    Total Bilirubin 0.68  0.20 - 1.00 mg/dL Final    Comment: Use of this assay is not recommended for patients undergoing treatment with eltrombopag due to the potential for falsely elevated results. N-acetyl-p-benzoquinone imine (metabolite of Acetaminophen) will generate erroneously low results in samples for patients that have taken an overdose of Acetaminophen.     eGFR 89  ml/min/1.73sq m Final    Narrative:     [place] guidelines for Chronic Kidney Disease (CKD):   •  Stage 1 with normal or high GFR (GFR > 90 mL/min/1.73 square meters)  •  Stage 2 Mild CKD (GFR = 60-89 mL/min/1.73 square meters)  •  Stage 3A Moderate CKD (GFR = 45-59 mL/min/1.73 square meters)  •  Stage 3B Moderate CKD (GFR = 30-44 mL/min/1.73 square meters)  •  Stage 4 Severe CKD (GFR = 15-29 mL/min/1.73 square meters)  •  Stage 5 End Stage CKD (GFR <15 mL/min/1.73 square meters)  Note: GFR calculation is accurate only with a steady state creatinine   LIPASE - Abnormal    Lipase 10,019 (*) 11 - 82 u/L Final   HS TROPONIN I 0HR - Normal    hs TnI 0hr 5  "Refer to ACS Flowchart"- see link ng/L Final    Comment:                                              Initial (time 0) result  If >=50 ng/L, Myocardial injury suggested ;  Type of myocardial injury and treatment strategy  to be determined. If 5-49 ng/L, a delta result at 2 hours and or 4 hours will be needed to further evaluate. If <4 ng/L, and chest pain has been >3 hours since onset, patient may qualify for discharge based on the HEART score in the ED. If <5 ng/L and <3hours since onset of chest pain, a delta result at 2 hours will be needed to further evaluate. HS Troponin 99th Percentile URL of a Health Population=12 ng/L with a 95% Confidence Interval of 8-18 ng/L. Second Troponin (time 2 hours)  If calculated delta >= 20 ng/L,  Myocardial injury suggested ; Type of myocardial injury and treatment strategy to be determined. If 5-49 ng/L and the calculated delta is 5-19 ng/L, consult medical service for evaluation. Continue evaluation for ischemia on ecg and other possible etiology and repeat hs troponin at 4 hours. If delta is <5 ng/L at 2 hours, consider discharge based on risk stratification via the HEART score (if in ED), or SCARLETT risk score in IP/Observation.     HS Troponin 99th Percentile URL of a Health Population=12 ng/L with a 95% Confidence Interval of 8-18 ng/L.   CBC AND DIFFERENTIAL    WBC 5.70  4.31 - 10.16 Thousand/uL Final    RBC 4.51  3.81 - 5.12 Million/uL Final    Hemoglobin 13.7  11.5 - 15.4 g/dL Final    Hematocrit 40.6  34.8 - 46.1 % Final    MCV 90  82 - 98 fL Final    MCH 30.4  26.8 - 34.3 pg Final    MCHC 33.7  31.4 - 37.4 g/dL Final    RDW 12.8  11.6 - 15.1 % Final MPV 11.7  8.9 - 12.7 fL Final    Platelets 400  957 - 390 Thousands/uL Final    nRBC 0  /100 WBCs Final    Neutrophils Relative 68  43 - 75 % Final    Immat GRANS % 0  0 - 2 % Final    Lymphocytes Relative 21  14 - 44 % Final    Monocytes Relative 9  4 - 12 % Final    Eosinophils Relative 2  0 - 6 % Final    Basophils Relative 0  0 - 1 % Final    Neutrophils Absolute 3.82  1.85 - 7.62 Thousands/µL Final    Immature Grans Absolute 0.02  0.00 - 0.20 Thousand/uL Final    Lymphocytes Absolute 1.22  0.60 - 4.47 Thousands/µL Final    Monocytes Absolute 0.52  0.17 - 1.22 Thousand/µL Final    Eosinophils Absolute 0.11  0.00 - 0.61 Thousand/µL Final    Basophils Absolute 0.01  0.00 - 0.10 Thousands/µL Final         ED Course / Workup Pending (followup):  CT A/P    1:37 AM  Signout on the patient. We will follow-up on CT scan, possible discussion again with bariatric surgery if there is a surgical complication or admit to internal medicine for pancreatitis. CT abdomen pelvis with contrast   Final Result      Prominence of the pancreas is noted with some peripancreatic inflammatory changes seen in the region of the pancreatic head and neck. A small amount of fluid tracks along the peritoneal reflection; findings taken together suspicious for acute    pancreatitis with associated acute peripancreatic collection. Correlation with the patient's symptoms and laboratory values recommended. No discrete evidence of organizing fluid collection or pancreatic necrosis is seen. Mildly distended gallbladder, mild gallbladder wall thickening, nonspecific, possibly reactive. Renal cysts, status post gastric sleeve, enlarged fibroid uterus, and other findings as above. Workstation performed: [id number]           2:50 AM  CT noted. Patient has evidence of pancreatitis but also has some mild gallbladder distention with wall thickening. Tiger text sent to bariatrics for further advice on admission.       3:00 AM  Spoke with bariatrics. They state that there is nothing that they would do from their standpoint. Advised general surgery evaluation for possible gallbladder pathology. Tiger text sent to the resident on-call to discuss further. 3:20 AM  Patient discussed with general surgery. Reviewed CT and recent ultrasound. At this point there is no surgical reason to admit the patient but they do agree with internal medicine admitting. 3:32 AM  Case discussed with internal medicine. They will admit to their service. Procedures  MDM        Disposition  Final diagnoses:   Pancreatitis     Time reflects when diagnosis was documented in both MDM as applicable and the Disposition within this note     Time User Action Codes Description Comment    8/14/2023  1:06 AM Anna Pulido Add [K85.90] Pancreatitis       ED Disposition     ED Disposition   Admit    Condition   Stable    Date/Time   Mon Aug 14, 2023  3:23 AM    Comment   Case was discussed with [facility] and the patient's admission status was agreed to be Admission Status: inpatient status to the service of Dr. Gudelia Cortez . Follow-up Information    None       Patient's Medications   Discharge Prescriptions    No medications on file     No discharge procedures on file.        ED Provider  Electronically Signed by     Daquan Lennon., DO  08/14/23 [id number]

## 2023-08-14 NOTE — ASSESSMENT & PLAN NOTE
Lab Results   Component Value Date    HGBA1C 6.6 (H) 05/12/2023       No results for input(s): "POCGLU" in the last 72 hours.     Blood Sugar Average: Last 72 hrs:  a1c well controlled at 6.6% off pharmacotherapy

## 2023-08-14 NOTE — CONSULTS
[facility] - Inpatient Consultation  Orjos ealberto Kimbrough 43 y.o. female MRN: [id number]  Unit/Bed#: 1575 [address] [place] 228-01 Encounter: [id number]    Reason for Consult / Principal Problem:     Acute Pancreatitis     ASSESSMENT & PLAN:      Acute Pancreatitis  -Patient presenting with a 1 day history of chest pain. Triggering factor unclear - per patient starting after eating cooked sweet potatos and eggs.  -Lipase 10,019  -Triglycerides 70  -Troponins WNL. -Lactic WNL. -No history of pancreatitis, no alcohol or drug use, does note history of similar symptoms after her sleeve gastrectomy that self resolved. -Does notably have a history of recent sleeve gastrectomy July 2023.  -RUQ US: Biliary sludge in the gallbladder without evidence of cholelithiasis. Nonspecific gallbladder wall thickening, 7 mm in maximal thickness. -CT AP w/contrast: "Prominence of the pancreas is noted with some peripancreatic inflammatory changes seen in the region of the pancreatic head and neck. A small amount of fluid tracks along the peritoneal reflection; findings taken together suspicious for acute pancreatitis with associated acute peripancreatic collection. Correlation with the patient's symptoms and laboratory values recommended. No discrete evidence of organizing fluid collection or pancreatic necrosis is seen. Mildly distended gallbladder, mild gallbladder wall thickening, nonspecific, possibly reactive."  -On Protonix 20 mg daily. Plan:  -Unclear etiology of acute pancreatitis, no history of alcohol, triglycerides WNL, unclear gallstone pending RUQ US, no likely inciting causes of drug induced pancreatitis.  -Will check Ig G 4  -Supportive management at this time. -NPO currently, okay to advance diet as tolerated. -Continue IVF. -RUQ US showing biliary sludge, no evidence of cholelithiasis. -Outpatient MRCP.     Transaminitis   - -> 109,  -> 99, Alk Phos 47 -> 43, TBili 0.68 -> 0.57.    Plan:  -Trending down slightly, monitor closely.  -Pending RUQ US results. -Trend labs. T2DM  -History noted. -Per bariatrics and primary team.    Hypokalemia  -Per primary team.  ______________________________________________________________________    HISTORY OF PRESENT ILLNESS:  Patient is a 44 y/o female with PMH including T2DM, GERD, MARIA LUISA, S/P recent sleeve gastrectomy in July 2023. Per patient, she notes that yesterday she was feeling well until the evening after eating sweet potato mixed with eggs. Both were cooked. She does not typically have symptoms like this, but does note history of similar symptoms after her sleeve gastrectomy that was self-resolving. She denies history of pancreatitis, food tolerance or allergies. She noted sharp sternal chest pain radiating to her back. The pain was unrelenting and patient was not able to stand, so she presented to the ED. We were consulted for acute pancreatitis. This morning on exam, patient feels significantly better, noting that her pain has entirely resolved. Denies other symptoms at this time including N/V/C/D. Does confirm history of GERD with burning chest pain. She notes that after the last month she has progressed from puree to soft foods after her surgery. Denies any history of smoking, alcohol use, recreational drug use, or history of pancreatitis.      REVIEW OF SYSTEMS:    CONSTITUTIONAL: Denies any fever, chills, rigors, and weight loss  HEENT: No earache or tinnitus, denies hearing loss or visual disturbances  CARDIOVASCULAR: No chest pain or palpitations   RESPIRATORY: Denies any cough, hemoptysis, shortness of breath or dyspnea on exertion  GASTROINTESTINAL: As noted in the History of Present Illness   GENITOURINARY: No problems with urination, denies any hematuria or dysuria  NEUROLOGIC: No dizziness or vertigo, denies headaches   MUSCULOSKELETAL: Denies any muscle or joint pain   SKIN: Denies skin rashes or itching   ENDOCRINE: Denies excessive thirst, denies intolerance to heat or cold  PSYCHOSOCIAL: Denies depression or anxiety, denies any recent memory loss     Historical Information   Past Medical History:   Diagnosis Date   • Diabetes mellitus ([address])    • Hypertension    • Obesity    • Periodontal disease    • Risk factors for obstructive sleep apnea 08/08/2022   • Seasonal allergies      Past Surgical History:   Procedure Laterality Date   • CATARACT EXTRACTION Left 06/08/2022   • CATARACT EXTRACTION Right 11/10/2021   • COLONOSCOPY  2013   • NJ LAPS GSTRC RSTRICTIV PX LONGITUDINAL GASTRECTOMY N/A 7/24/2023    Procedure: GASTRECTOMY SLEEVE W/ ROBOT & INTRAOPERATIVE EGD;  Surgeon: Tammy Jimenez MD;  Location: [facility];  Service: Bariatrics     Social History   Social History     Substance and Sexual Activity   Alcohol Use Never     Social History     Substance and Sexual Activity   Drug Use Never     Social History     Tobacco Use   Smoking Status Never   Smokeless Tobacco Never     Family History   Problem Relation Age of Onset   • Obesity Mother    • Sleep apnea Neg Hx        Meds/Allergies   Medications Prior to Admission   Medication   • acetaminophen (TYLENOL) 160 mg/5 mL liquid   • Blood Glucose Monitoring Suppl (OneTouch Verio Flex System) w/Device KIT   • Insulin Pen Needle 30G X 8 MM MISC   • Multiple Vitamins-Minerals (multivitamin with minerals) tablet   • omeprazole (PriLOSEC) 20 mg delayed release capsule   • oxyCODONE (Roxicodone) 5 immediate release tablet   • oxyCODONE-acetaminophen (PERCOCET) 5-325 mg per tablet   • Blood Pressure Monitoring (Adult Blood Pressure Cuff Lg) KIT   • cetirizine (ZyrTEC) 10 mg tablet   • cholecalciferol (VITAMIN D3) 400 units tablet   • enoxaparin (LOVENOX) 40 mg/0.4 mL   • loratadine (CLARITIN) 10 mg tablet   • ondansetron (ZOFRAN-ODT) 4 mg disintegrating tablet   • OneTouch Delica Lancets 19X MISC   • OneTouch Verio test strip   • Trulicity 1.5 RW/6.0OD injection     Current Facility-Administered Medications   Medication Dose Route Frequency   • enoxaparin (LOVENOX) subcutaneous injection 40 mg  40 mg Subcutaneous Daily   • morphine injection 4 mg  4 mg Intravenous Q4H PRN   • multi-electrolyte (PLASMALYTE-A/ISOLYTE-S PH 7.4) IV solution  125 mL/hr Intravenous Continuous   • ondansetron (ZOFRAN) injection 4 mg  4 mg Intravenous Q6H PRN   • oxyCODONE (ROXICODONE) IR tablet 5 mg  5 mg Oral Q4H PRN   • pantoprazole (PROTONIX) EC tablet 20 mg  20 mg Oral Early Morning     No Known Allergies    PHYSICAL EXAM:      Objective   Blood pressure 135/88, pulse 69, temperature (!) 97.4 °F (36.3 °C), resp. rate 15, height 5' (1.524 m), weight 110 kg (241 lb 10 oz), last menstrual period 07/23/2023, SpO2 98 %, not currently breastfeeding. Body mass index is 47.19 kg/m². No intake or output data in the 24 hours ending 08/14/23 1354    General Appearance:   Alert, cooperative, no distress, obese   HEENT:   Normocephalic, atraumatic, anicteric     Neck:   Supple, symmetrical, trachea midline   Lungs:   Equal chest rise, respirations unlabored    Heart:   Regular rate and rhythm   Abdomen:   Soft, non-tender, non-distended; normal bowel sounds; no masses, no organomegaly    Rectal:   Deferred    Extremities:   No cyanosis, clubbing or edema    Neuro:    Moves all 4 extremities    Skin:   No jaundice, rashes, or lesions      LAB RESULTS:     Admission on 08/13/2023   Component Date Value   • WBC 08/13/2023 5.70    • RBC 08/13/2023 4.51    • Hemoglobin 08/13/2023 13.7    • Hematocrit 08/13/2023 40.6    • MCV 08/13/2023 90    • MCH 08/13/2023 30.4    • MCHC 08/13/2023 33.7    • RDW 08/13/2023 12.8    • MPV 08/13/2023 11.7    • Platelets 11/43/2404 272    • nRBC 08/13/2023 0    • Neutrophils Relative 08/13/2023 68    • Immat GRANS % 08/13/2023 0    • Lymphocytes Relative 08/13/2023 21    • Monocytes Relative 08/13/2023 9    • Eosinophils Relative 08/13/2023 2    • Basophils Relative 08/13/2023 0    • Neutrophils Absolute 08/13/2023 3.82    • Immature Grans Absolute 08/13/2023 0.02    • Lymphocytes Absolute 08/13/2023 1.22    • Monocytes Absolute 08/13/2023 0.52    • Eosinophils Absolute 08/13/2023 0.11    • Basophils Absolute 08/13/2023 0.01    • Sodium 08/13/2023 137    • Potassium 08/13/2023 3.5    • Chloride 08/13/2023 107    • CO2 08/13/2023 22    • ANION GAP 08/13/2023 8    • BUN 08/13/2023 10    • Creatinine 08/13/2023 0.83    • Glucose 08/13/2023 111    • Calcium 08/13/2023 9.0    • AST 08/13/2023 149 (H)    • ALT 08/13/2023 101 (H)    • Alkaline Phosphatase 08/13/2023 47    • Total Protein 08/13/2023 6.9    • Albumin 08/13/2023 3.8    • Total Bilirubin 08/13/2023 0.68    • eGFR 08/13/2023 89    • Lipase 08/13/2023 10,019 (H)    • hs TnI 0hr 08/13/2023 5    • Ventricular Rate 08/13/2023 78    • Atrial Rate 08/13/2023 78    • TN Interval 08/13/2023 160    • QRSD Interval 08/13/2023 80    • QT Interval 08/13/2023 364    • QTC Interval 08/13/2023 414    • P Axis 08/13/2023 32    • QRS Axis 08/13/2023 -47    • T Wave Axis 08/13/2023 -4    • Ventricular Rate 08/14/2023 75    • Atrial Rate 08/14/2023 75    • TN Interval 08/14/2023 156    • QRSD Interval 08/14/2023 84    • QT Interval 08/14/2023 382    • QTC Interval 08/14/2023 426    • P Axis 08/14/2023 34    • QRS Axis 08/14/2023 -39    • T Wave Axis 08/14/2023 -8    • WBC 08/14/2023 4.43    • RBC 08/14/2023 4.15    • Hemoglobin 08/14/2023 12.9    • Hematocrit 08/14/2023 37.1    • MCV 08/14/2023 89    • MCH 08/14/2023 31.1    • MCHC 08/14/2023 34.8    • RDW 08/14/2023 12.7    • MPV 08/14/2023 10.8    • Platelets 99/25/5570 261    • nRBC 08/14/2023 0    • Neutrophils Relative 08/14/2023 67    • Immat GRANS % 08/14/2023 0    • Lymphocytes Relative 08/14/2023 22    • Monocytes Relative 08/14/2023 9    • Eosinophils Relative 08/14/2023 2    • Basophils Relative 08/14/2023 0    • Neutrophils Absolute 08/14/2023 2.95    • Immature Grans Absolute 08/14/2023 0.01    • Lymphocytes Absolute 08/14/2023 0.97    • Monocytes Absolute 08/14/2023 0.39    • Eosinophils Absolute 08/14/2023 0.10    • Basophils Absolute 08/14/2023 0.01    • Sodium 08/14/2023 137    • Potassium 08/14/2023 3.2 (L)    • Chloride 08/14/2023 108    • CO2 08/14/2023 23    • ANION GAP 08/14/2023 6    • BUN 08/14/2023 7    • Creatinine 08/14/2023 0.75    • Glucose 08/14/2023 102    • Calcium 08/14/2023 8.5    • Corrected Calcium 08/14/2023 9.0    • AST 08/14/2023 109 (H)    • ALT 08/14/2023 99 (H)    • Alkaline Phosphatase 08/14/2023 43    • Total Protein 08/14/2023 6.0 (L)    • Albumin 08/14/2023 3.4 (L)    • Total Bilirubin 08/14/2023 0.57    • eGFR 08/14/2023 100    • Cholesterol 08/14/2023 99    • Triglycerides 08/14/2023 70    • HDL, Direct 08/14/2023 29 (L)    • LDL Calculated 08/14/2023 56        RADIOLOGY RESULTS: I have personally reviewed pertinent imaging studies.     Guerda Daniels MD  [facility]  Internal Medicine Residency PGY-2

## 2023-08-14 NOTE — PLAN OF CARE
Problem: Potential for Falls  Goal: Patient will remain free of falls  Description: INTERVENTIONS:  - Educate patient/family on patient safety including physical limitations  - Instruct patient to call for assistance with activity   - Consult OT/PT to assist with strengthening/mobility   - Keep Call bell within reach  - Keep bed low and locked with side rails adjusted as appropriate  - Keep care items and personal belongings within reach  - Initiate and maintain comfort rounds  - Make Fall Risk Sign visible to staff  - Apply yellow socks and bracelet for high fall risk patients  - Consider moving patient to room near nurses station  Outcome: Progressing     Problem: PAIN - ADULT  Goal: Verbalizes/displays adequate comfort level or baseline comfort level  Description: Interventions:  - Encourage patient to monitor pain and request assistance  - Assess pain using appropriate pain scale  - Administer analgesics based on type and severity of pain and evaluate response  - Implement non-pharmacological measures as appropriate and evaluate response  - Consider cultural and social influences on pain and pain management  - Notify physician/advanced practitioner if interventions unsuccessful or patient reports new pain  Outcome: Progressing     Problem: SAFETY ADULT  Goal: Patient will remain free of falls  Description: INTERVENTIONS:  - Educate patient/family on patient safety including physical limitations  - Instruct patient to call for assistance with activity   - Consult OT/PT to assist with strengthening/mobility   - Keep Call bell within reach  - Keep bed low and locked with side rails adjusted as appropriate  - Keep care items and personal belongings within reach  - Initiate and maintain comfort rounds  - Make Fall Risk Sign visible to staff  - Apply yellow socks and bracelet for high fall risk patients  - Consider moving patient to room near nurses station  Outcome: Progressing  Goal: Maintain or return to baseline ADL function  Description: INTERVENTIONS:  -  Assess patient's ability to carry out ADLs; assess patient's baseline for ADL function and identify physical deficits which impact ability to perform ADLs (bathing, care of mouth/teeth, toileting, grooming, dressing, etc.)  - Assess/evaluate cause of self-care deficits   - Assess range of motion  - Assess patient's mobility; develop plan if impaired  - Assess patient's need for assistive devices and provide as appropriate  - Encourage maximum independence but intervene and supervise when necessary  - Involve family in performance of ADLs  - Assess for home care needs following discharge   - Consider OT consult to assist with ADL evaluation and planning for discharge  - Provide patient education as appropriate  Outcome: Progressing  Goal: Maintains/Returns to pre admission functional level  Description: INTERVENTIONS:  - Perform BMAT or MOVE assessment daily.   - Set and communicate daily mobility goal to care team and patient/family/caregiver. - Collaborate with rehabilitation services on mobility goals if consulted  - Perform Range of Motion 3times a day. - Reposition patient every 2 hours.   - Dangle patient 3 times a day  - Stand patient 3 times a day  - Ambulate patient 3 times a day  - Out of bed to chair 3 times a day   - Out of bed for meals 3 times a day  - Out of bed for toileting  - Record patient progress and toleration of activity level   Outcome: Progressing     Problem: DISCHARGE PLANNING  Goal: Discharge to home or other facility with appropriate resources  Description: INTERVENTIONS:  - Identify barriers to discharge w/patient and caregiver  - Arrange for needed discharge resources and transportation as appropriate  - Identify discharge learning needs (meds, wound care, etc.)  - Arrange for interpretive services to assist at discharge as needed  - Refer to Case Management Department for coordinating discharge planning if the patient needs post-hospital services based on physician/advanced practitioner order or complex needs related to functional status, cognitive ability, or social support system  Outcome: Progressing     Problem: Knowledge Deficit  Goal: Patient/family/caregiver demonstrates understanding of disease process, treatment plan, medications, and discharge instructions  Description: Complete learning assessment and assess knowledge base.   Interventions:  - Provide teaching at level of understanding  - Provide teaching via preferred learning methods  Outcome: Progressing

## 2023-08-14 NOTE — ASSESSMENT & PLAN NOTE
Mild may be reactive to #1  Does have pericholecystic fluid noted as well possibly reactive.  No fevers/chills but does have + murphys  Us ruq  Low threshold for abx initiation

## 2023-08-14 NOTE — CONSULTS
Consultation - Bariatric Surgery   Avelina Alvarado 43 y.o. female MRN: [id number]  Unit/Bed#: [facility] -01 Encounter: [id number]    Assessment/Plan     Assessment:  42 y/o F with a history of morbid obesity s/p robotic sleeve gastrectomy on 7/24/2023 now admitted for epigastric abdominal pain radiating to the back following PO intake. Found to have elevated [place] at [id number] and concern for pancreatitis. There was also evidence of stranding and dilated gallbladder on CT. Plan:  No indication for surgical intervention  Recommend RUQ to assess for gallbladder issues  Conservative management of pancreatitis    History of Present Illness     HPI:  Avelina Alvarado is a 43 y.o. female Body mass index is 47.19 kg/m². with PMH of morbid obesity with BMI 47, with recent sleeve gastrectomy hypertension diabetes mellitus who presents with epigastric pain radiating to back after eating dinner that started yesterday evening. Currently patient's pain has improved since admission, denies fevers, denies any nausea or vomiting, denies any changes in stool habits. Consults    Review of Systems   Constitutional: Negative for fever. Respiratory: Negative for cough and shortness of breath. Cardiovascular: Negative for chest pain. Gastrointestinal: Positive for abdominal pain. Negative for abdominal distention, nausea and vomiting. Skin: Negative for color change.        Historical Information   Past Medical History:   Diagnosis Date   • Diabetes mellitus ([address])    • Hypertension    • Obesity    • Periodontal disease    • Risk factors for obstructive sleep apnea 08/08/2022   • Seasonal allergies      Past Surgical History:   Procedure Laterality Date   • CATARACT EXTRACTION Left 06/08/2022   • CATARACT EXTRACTION Right 11/10/2021   • COLONOSCOPY  2013   • [place] LAPS [facility] RSTRICTIV PX LONGITUDINAL GASTRECTOMY N/A 7/24/2023    Procedure: GASTRECTOMY SLEEVE W/ ROBOT & INTRAOPERATIVE EGD;  Surgeon: Rico Pratt MD; Location: [facility] OR;  Service: Bariatrics     Social History   Social History     Substance and Sexual Activity   Alcohol Use Never     Social History     Substance and Sexual Activity   Drug Use Never     Social History     Tobacco Use   Smoking Status Never   Smokeless Tobacco Never     Family History: non-contributory    Meds/Allergies   N/A  No Known Allergies    Objective   First Vitals:   Blood Pressure: (!) 190/105 (08/13/23 2243)  Pulse: 88 (08/13/23 2240)  Temperature: 98.1 °F (36.7 °C) (08/13/23 2240)  Temp Source: Oral (08/13/23 2240)  Respirations: 18 (08/13/23 2240)  Height: 5' (152.4 cm) (08/14/23 0426)  Weight - Scale: 110 kg (242 lb 8.1 oz) (08/13/23 2238)  SpO2: 97 % (08/13/23 2240)    Current Vitals:   Blood Pressure: 136/89 (08/14/23 0514)  Pulse: 69 (08/14/23 0514)  Temperature: 98.3 °F (36.8 °C) (08/14/23 0514)  Temp Source: Oral (08/13/23 2240)  Respirations: 18 (08/14/23 0514)  Height: 5' (152.4 cm) (08/14/23 0500)  Weight - Scale: 110 kg (241 lb 10 oz) (08/14/23 0500)  SpO2: 99 % (08/14/23 0514)    No intake or output data in the 24 hours ending 08/14/23 0720    Invasive Devices     Peripheral Intravenous Line  Duration           Peripheral IV 08/13/23 Distal;Right;Ventral (anterior) Forearm <1 day                Physical Exam  Constitutional:       Appearance: She is well-developed. Cardiovascular:      Rate and Rhythm: Normal rate and regular rhythm. Pulmonary:      Effort: Pulmonary effort is normal.   Abdominal:      Palpations: Abdomen is soft. Comments: Mild tenderness in LUQ and epigastrium   Skin:     General: Skin is warm. Neurological:      General: No focal deficit present. Mental Status: She is alert. Lab Results: I have personally reviewed pertinent lab results. Imaging: I have personally reviewed pertinent reports. EKG, Pathology, and Other Studies: N/A    Counseling / Coordination of Care  Total floor / unit time spent today 15 minutes.   Greater than 50% of total time was spent with the patient and / or family counseling and / or coordination of care.

## 2023-08-14 NOTE — ED PROVIDER NOTES
History  Chief Complaint   Patient presents with   • Chest Pain     Substernal chest pain that radiates around to upper back that started at 6pm. Took tums without relief. States pain is "sharp." Denies SOB. Had this pain 1 month ago and was told it was gas from having recent sleeve surgery. 44 yo F c/o onset of epigastric pain, radiating to mid chest, and burping since eating sweet potatoes and chicken about 6pm.  She is S/P sleeve gastrectomy 7/24/23, and has experienced periodic discomfort similar to this on one other occasion 8/1/23. She is following the diet which is still advised to be soft, which is why she was having sweet potatoes, which were not spicy . She had some loose stool yesterday, but it resolved today, and she is passing gas . Prior to Admission Medications   Prescriptions Last Dose Informant Patient Reported? Taking? Blood Glucose Monitoring Suppl (OneTouch Verio Flex System) w/Device KIT   No No   Sig: TEST EVERY MORNING   Blood Pressure Monitoring (Adult Blood Pressure Cuff Lg) KIT   No No   Sig: Use in the morning   Patient not taking: Reported on 4/6/2023   Insulin Pen Needle 30G X 8 MM MISC   No No   Sig: Use once a week   Multiple Vitamins-Minerals (multivitamin with minerals) tablet   Yes No   Sig: Take 1 tablet by mouth daily   OneTouch Delica Lancets 80N MISC   No No   Sig: Check blood sugars twice daily. Please substitute with appropriate alternative as covered by patient's insurance. Dx: E11.65   Patient not taking: Reported on 8/3/2023   OneTouch Verio test strip   No No   Sig: Use 1 each in the morning Use as instructed   Patient not taking: Reported on 6/9/5530   Trulicity 1.5 OI/4.6GR injection   No No   Sig: ADMINISTER 1.5 MG UNDER THE SKIN EVERY 7 DAYS   Patient not taking: Reported on 8/3/2023   acetaminophen (TYLENOL) 160 mg/5 mL liquid   Yes No   cetirizine (ZyrTEC) 10 mg tablet   No No   Sig: Take 1 tablet (10 mg total) by mouth in the morning. cholecalciferol (VITAMIN D3) 400 units tablet   Yes No   Sig: Take 2,000 Units by mouth daily   Patient not taking: Reported on 8/3/2023   enoxaparin (LOVENOX) 40 mg/0.4 mL   No No   Sig: Inject 0.4 mL (40 mg total) under the skin daily for 13 days Do not start before July 25, 2023. loratadine (CLARITIN) 10 mg tablet   Yes No   Sig: Take 10 mg by mouth daily As needed   Patient not taking: Reported on 8/3/2023   omeprazole (PriLOSEC) 20 mg delayed release capsule   No No   Sig: Take 1 capsule (20 mg total) by mouth daily Do not start before July 25, 2023. ondansetron (ZOFRAN-ODT) 4 mg disintegrating tablet   No No   Sig: Take 1 tablet (4 mg total) by mouth every 8 (eight) hours as needed for nausea or vomiting   oxyCODONE (Roxicodone) 5 immediate release tablet   No No   Sig: Take 1 tablet (5 mg total) by mouth every 4 (four) hours as needed for moderate pain Max Daily Amount: 30 mg Do not start before July 25, 2023.    Patient not taking: Reported on 8/3/2023   oxyCODONE-acetaminophen (PERCOCET) 5-325 mg per tablet   No No   Sig: Take 1 tablet by mouth every 4 (four) hours as needed for moderate pain Max Daily Amount: 6 tablets      Facility-Administered Medications: None       Past Medical History:   Diagnosis Date   • Diabetes mellitus ([address])    • Hypertension    • Obesity    • Periodontal disease    • Risk factors for obstructive sleep apnea 08/08/2022   • Seasonal allergies        Past Surgical History:   Procedure Laterality Date   • CATARACT EXTRACTION Left 06/08/2022   • CATARACT EXTRACTION Right 11/10/2021   • COLONOSCOPY  2013   • [place] LAPS [address] RSTRICTIV PX LONGITUDINAL GASTRECTOMY N/A 7/24/2023    Procedure: GASTRECTOMY SLEEVE W/ ROBOT & INTRAOPERATIVE EGD;  Surgeon: Lisa Shoemaker MD;  Location: [facility];  Service: Bariatrics       Family History   Problem Relation Age of Onset   • Obesity Mother    • Sleep apnea Neg Hx      I have reviewed and agree with the history as documented. E-Cigarette/Vaping   • E-Cigarette Use Never User      E-Cigarette/Vaping Substances     Social History     Tobacco Use   • Smoking status: Never   • Smokeless tobacco: Never   Vaping Use   • Vaping Use: Never used   Substance Use Topics   • Alcohol use: Never   • Drug use: Never       Review of Systems    Physical Exam  Physical Exam  Vitals and nursing note reviewed. Constitutional:       General: She is not in acute distress. Appearance: She is well-developed. She is not diaphoretic. HENT:      Head: Normocephalic and atraumatic. Right Ear: External ear normal.      Left Ear: External ear normal.      Nose: Nose normal.   Eyes:      Conjunctiva/sclera: Conjunctivae normal.      Pupils: Pupils are equal, round, and reactive to light. Cardiovascular:      Rate and Rhythm: Normal rate and regular rhythm. Pulmonary:      Effort: Pulmonary effort is normal.   Abdominal:      Palpations: Abdomen is soft. Tenderness: There is abdominal tenderness in the epigastric area. Musculoskeletal:         General: Normal range of motion. Cervical back: Normal range of motion and neck supple. Skin:     General: Skin is warm and dry. Findings: No rash. Neurological:      Mental Status: She is alert and oriented to person, place, and time. Gait: Gait normal.   Psychiatric:         Behavior: Behavior normal.         Thought Content:  Thought content normal.         Judgment: Judgment normal.         Vital Signs  ED Triage Vitals   Temperature Pulse Respirations Blood Pressure SpO2   08/13/23 2240 08/13/23 2240 08/13/23 2240 08/13/23 2243 08/13/23 2240   98.1 °F (36.7 °C) 88 18 (!) 190/105 97 %      Temp Source Heart Rate Source Patient Position - Orthostatic VS BP Location FiO2 (%)   08/13/23 2240 08/13/23 2240 08/13/23 2240 08/13/23 2240 --   Oral Monitor Sitting Right arm       Pain Score       08/13/23 2240       7           Vitals:    08/13/23 2240 08/13/23 2243 08/13/23 2346   BP:  (!) 190/105 (!) 150/103   Pulse: 88  84   Patient Position - Orthostatic VS: Sitting  Lying         Visual Acuity      ED Medications  Medications   Famotidine (PF) (PEPCID) injection 20 mg (20 mg Intravenous Given 8/13/23 2343)   morphine injection 4 mg (4 mg Intravenous Given 8/13/23 2343)       Diagnostic Studies  Results Reviewed     Procedure Component Value Units Date/Time    Comprehensive metabolic panel [[id number]]  (Abnormal) Collected: 08/13/23 2343    Lab Status: Final result Specimen: Blood from Arm, Right Updated: 08/14/23 0028     Sodium 137 mmol/L      Potassium 3.5 mmol/L      Chloride 107 mmol/L      CO2 22 mmol/L      ANION GAP 8 mmol/L      BUN 10 mg/dL      Creatinine 0.83 mg/dL      Glucose 111 mg/dL      Calcium 9.0 mg/dL       U/L       U/L      Alkaline Phosphatase 47 U/L      Total Protein 6.9 g/dL      Albumin 3.8 g/dL      Total Bilirubin 0.68 mg/dL      eGFR 89 ml/min/1.73sq m     Narrative:      [place] guidelines for Chronic Kidney Disease (CKD):   •  Stage 1 with normal or high GFR (GFR > 90 mL/min/1.73 square meters)  •  Stage 2 Mild CKD (GFR = 60-89 mL/min/1.73 square meters)  •  Stage 3A Moderate CKD (GFR = 45-59 mL/min/1.73 square meters)  •  Stage 3B Moderate CKD (GFR = 30-44 mL/min/1.73 square meters)  •  Stage 4 Severe CKD (GFR = 15-29 mL/min/1.73 square meters)  •  Stage 5 End Stage CKD (GFR <15 mL/min/1.73 square meters)  Note: GFR calculation is accurate only with a steady state creatinine    Lipase [[id number]]  (Abnormal) Collected: 08/13/23 2343    Lab Status: Final result Specimen: Blood from Arm, Right Updated: 08/14/23 0028     Lipase 10,019 u/L     HS Troponin 0hr (reflex protocol) [[id number]]  (Normal) Collected: 08/13/23 2343    Lab Status: Final result Specimen: Blood from Arm, Right Updated: 08/14/23 0021     hs TnI 0hr 5 ng/L     CBC and differential [[id number]] Collected: 08/13/23 2343    Lab Status: Final result Specimen: Blood from Arm, Right Updated: 08/14/23 0015     WBC 5.70 Thousand/uL      RBC 4.51 Million/uL      Hemoglobin 13.7 g/dL      Hematocrit 40.6 %      MCV 90 fL      MCH 30.4 pg      MCHC 33.7 g/dL      RDW 12.8 %      MPV 11.7 fL      Platelets 407 Thousands/uL      nRBC 0 /100 WBCs      Neutrophils Relative 68 %      Immat GRANS % 0 %      Lymphocytes Relative 21 %      Monocytes Relative 9 %      Eosinophils Relative 2 %      Basophils Relative 0 %      Neutrophils Absolute 3.82 Thousands/µL      Immature Grans Absolute 0.02 Thousand/uL      Lymphocytes Absolute 1.22 Thousands/µL      Monocytes Absolute 0.52 Thousand/µL      Eosinophils Absolute 0.11 Thousand/µL      Basophils Absolute 0.01 Thousands/µL                  CT abdomen pelvis with contrast    (Results Pending)              Procedures  ECG 12 Lead Documentation Only    Date/Time: 8/13/2023 11:03 PM    Performed by: Stephon Mcknight MD  Authorized by: Stephon Mcknight MD    Rate:     ECG rate:  78  Rhythm:     Rhythm: sinus rhythm    Ectopy:     Ectopy: none    QRS:     QRS axis:  Normal    QRS intervals:  Normal  Conduction:     Conduction: normal    ST segments:     ST segments:  Normal  T waves:     T waves: normal               ED Course  ED Course as of 08/14/23 0137   Mon Aug 14, 2023   0046 Lipase(!): 10,019  Significantly elevated,    0109 Reviewed results with patient at bedside and updated on the plan. She is much more comfortable, reports near resolution of the pain at this time, resting with comfort. I discussed the indication for admission to the hospital for pancreatitis, and additional imaging. I d/w prelminarily with Dr. Terra Jackman, bariatric fellow, who agreed with CT imaging, and said to admit SLIM if no immediate surgical findings. SBIRT 22yo+    Flowsheet Row Most Recent Value   Initial Alcohol Screen: US AUDIT-C     1.  How often do you have a drink containing alcohol? 0 Filed at: 08/14/2023 0119   2. How many drinks containing alcohol do you have on a typical day you are drinking? 0 Filed at: 08/14/2023 0119   3a. Male UNDER 65: How often do you have five or more drinks on one occasion? 0 Filed at: 08/14/2023 0119   3b. FEMALE Any Age, or MALE 65+: How often do you have 4 or more drinks on one occassion? 0 Filed at: 08/14/2023 0119   Audit-C Score 0 Filed at: 08/14/2023 0119   KELLY: How many times in the past year have you. .. Used an illegal drug or used a prescription medication for non-medical reasons? Never Filed at: 08/14/2023 0119                    MDM    Disposition  Final diagnoses:   Pancreatitis     Time reflects when diagnosis was documented in both MDM as applicable and the Disposition within this note     Time User Action Codes Description Comment    8/14/2023  1:06 AM Kelsi Hardy [K85.90] Pancreatitis       ED Disposition     None      Follow-up Information    None         Patient's Medications   Discharge Prescriptions    No medications on file       No discharge procedures on file.     PDMP Review       Value Time User    PDMP Reviewed  Yes 8/1/2023 12:20 PM Sharla Garcia PA-C          ED Provider  Electronically Signed by           Stefan Pelaez MD  08/14/23 7212

## 2023-08-14 NOTE — H&P
[facility]  H&P  Name: Keily Sandhu 43 y.o. female I MRN: [id number]  Unit/Bed#: [place] [id number] [facility] 228-01 I Date of Admission: 8/13/2023   Date of Service: 8/14/2023 I Hospital Day: 0      Assessment/Plan   * Acute pancreatitis  Assessment & Plan  Unclear etiology. Possibly due to gb stone. R/o trigs in nondrinker. Lipase >9000. Ct a/p w/peripancreatic inflammation w/o necrosis or pseudocyst  Us ruq, npo ivf consult gi, lipid panel    Transaminitis  Assessment & Plan  Mild may be reactive to #1  Does have pericholecystic fluid noted as well possibly reactive. No fevers/chills but does have + murphys  Us ruq  Low threshold for abx initiation    Class 3 severe obesity due to excess calories with serious comorbidity and body mass index (BMI) of 45.0 to 49.9 in adult [facility])  Assessment & Plan  bmi 47 noted w/hx of sleeve gastrectomy in 07/23. Avoid nsaids    Type 2 diabetes mellitus without complication, without long-term current use of insulin ([facility])  Assessment & Plan  Lab Results   Component Value Date    HGBA1C 6.6 (H) 05/12/2023       No results for input(s): "POCGLU" in the last 72 hours. Blood Sugar Average: Last 72 hrs:  a1c well controlled at 6.6% off pharmacotherapy         VTE Pharmacologic Prophylaxis:   Moderate Risk (Score 3-4) - Pharmacological DVT Prophylaxis Ordered: enoxaparin (Lovenox). Code Status: fc  Discussion with family:     Anticipated Length of Stay: Patient will be admitted on an inpatient basis with an anticipated length of stay of greater than 2 midnights secondary to pancreatitis.     Total Time Spent on Date of Encounter in care of patient: 40 minutes This time was spent on one or more of the following: performing physical exam; counseling and coordination of care; obtaining or reviewing history; documenting in the medical record; reviewing/ordering tests, medications or procedures; communicating with other healthcare professionals and discussing with patient's family/caregivers. Chief Complaint: abd pain x1d    History of Present Illness:  Dominick Novak is a 43 y.o. female with a PMH of PMH of morbid obesity with BMI 52, with recent sleeve gastrectomy hypertension non-insulin-dependent diabetes mellitus who presents with epigastric pain radiating to back after eating dinner. Patient just had sleeve gastrectomy in July 2023. She was doing well and on the day prior to admission his sweets potato mixed with an egg without any particular butter cream milk. Not long after she started develop epigastric pain radiating to her back which is severe in nature and unrelenting with Tums so she came to the emergency room. She does note that 2 days prior to admission she has multiple episodes of loose to watery bowel movements which is since resolved. In the emergency room she was evaluated by blood work and CTAP concerning for pancreatitis with lipase of 10,000 and mild peripancreatic fat stranding with gallbladder with mildly distended with some wall thickening possibly possible reactive. Case was d/w surgery/bariatrics who  Recommended admission to medicine. Review of Systems:  Review of Systems   Gastrointestinal: Positive for abdominal pain. All other systems reviewed and are negative.       Past Medical and Surgical History:   Past Medical History:   Diagnosis Date   • Diabetes mellitus ([address])    • Hypertension    • Obesity    • Periodontal disease    • Risk factors for obstructive sleep apnea 08/08/2022   • Seasonal allergies        Past Surgical History:   Procedure Laterality Date   • CATARACT EXTRACTION Left 06/08/2022   • CATARACT EXTRACTION Right 11/10/2021   • COLONOSCOPY  2013   • CO LAPS GSTRC RSTRICTIV PX LONGITUDINAL GASTRECTOMY N/A 7/24/2023    Procedure: GASTRECTOMY SLEEVE W/ ROBOT & INTRAOPERATIVE EGD;  Surgeon: Alisa Wang MD;  Location: [facility];  Service: Bariatrics       Meds/Allergies:  Prior to Admission medications    Medication Sig Start Date End Date Taking? Authorizing Provider   acetaminophen (TYLENOL) 160 mg/5 mL liquid  7/25/23  Yes Historical Provider, MD   Blood Glucose Monitoring Suppl (OneTouch Verio Flex System) w/Device KIT TEST EVERY MORNING 1/12/23  Yes DEVONTE Ryder   Insulin Pen Needle 30G X 8 MM MISC Use once a week 2/4/23  Yes DEVONTE Ryder   Multiple Vitamins-Minerals (multivitamin with minerals) tablet Take 1 tablet by mouth daily   Yes Historical Provider, MD   omeprazole (PriLOSEC) 20 mg delayed release capsule Take 1 capsule (20 mg total) by mouth daily Do not start before July 25, 2023. 7/25/23  Yes Severa Ambrosia, PA-C   oxyCODONE (Roxicodone) 5 immediate release tablet Take 1 tablet (5 mg total) by mouth every 4 (four) hours as needed for moderate pain Max Daily Amount: 30 mg Do not start before July 25, 2023. 7/25/23  Yes Severa Ambrosia, PA-C   oxyCODONE-acetaminophen (PERCOCET) 5-325 mg per tablet Take 1 tablet by mouth every 4 (four) hours as needed for moderate pain Max Daily Amount: 6 tablets 8/1/23  Yes Mirella Vo PA-C   Blood Pressure Monitoring (Adult Blood Pressure Cuff Lg) KIT Use in the morning  Patient not taking: Reported on 4/6/2023 11/21/22   DEVONTE Ryder   cetirizine (ZyrTEC) 10 mg tablet Take 1 tablet (10 mg total) by mouth in the morning.  5/19/22   Kenna Bennett MD   cholecalciferol (VITAMIN D3) 400 units tablet Take 2,000 Units by mouth daily  Patient not taking: Reported on 8/3/2023    Historical Provider, MD   enoxaparin (LOVENOX) 40 mg/0.4 mL Inject 0.4 mL (40 mg total) under the skin daily for 13 days Do not start before July 25, 2023. 7/25/23 8/7/23  Mi Gomes PA-C   loratadine (CLARITIN) 10 mg tablet Take 10 mg by mouth daily As needed  Patient not taking: Reported on 8/3/2023    Historical Provider, MD   ondansetron (ZOFRAN-ODT) 4 mg disintegrating tablet Take 1 tablet (4 mg total) by mouth every 8 (eight) hours as needed for nausea or vomiting  Patient not taking: Reported on 8/14/2023 8/1/23   Sharla Mcwilliams PA-C   OneTouch Delica Lancets 88P MISC Check blood sugars twice daily. Please substitute with appropriate alternative as covered by patient's insurance. Dx: E11.65  Patient not taking: Reported on 8/3/2023 1/9/23   DEVONTE Park   OneTouch Verio test strip Use 1 each in the morning Use as instructed  Patient not taking: Reported on 8/3/2023 1/9/23   DEVONTE Park   Trulicity 1.5 BV/8.3EN injection ADMINISTER 1.5 MG UNDER THE SKIN EVERY 7 DAYS  Patient not taking: Reported on 8/3/2023 5/4/23   DEVONTE Park     I have reviewed home medications with patient personally. Allergies: No Known Allergies    Social History:  Marital Status: Single   Occupation:   Patient Pre-hospital Living Situation:   Patient Pre-hospital Level of Mobility:   Patient Pre-hospital Diet Restrictions:   Substance Use History:   Social History     Substance and Sexual Activity   Alcohol Use Never     Social History     Tobacco Use   Smoking Status Never   Smokeless Tobacco Never     Social History     Substance and Sexual Activity   Drug Use Never       Family History:  Family History   Problem Relation Age of Onset   • Obesity Mother    • Sleep apnea Neg Hx        Physical Exam:     Vitals:   Blood Pressure: 136/89 (08/14/23 0514)  Pulse: 69 (08/14/23 0514)  Temperature: 98.3 °F (36.8 °C) (08/14/23 0514)  Temp Source: Oral (08/13/23 2240)  Respirations: 18 (08/14/23 0514)  Height: 5' (152.4 cm) (08/14/23 0500)  Weight - Scale: 110 kg (241 lb 10 oz) (08/14/23 0500)  SpO2: 99 % (08/14/23 0514)    Physical Exam  Vitals reviewed. Constitutional:       Appearance: She is obese. HENT:      Head: Normocephalic and atraumatic. Right Ear: External ear normal.      Left Ear: External ear normal.   Eyes:      Extraocular Movements: Extraocular movements intact. Cardiovascular:      Rate and Rhythm: Normal rate and regular rhythm. Heart sounds: No murmur heard. No friction rub. No gallop. Pulmonary:      Effort: No respiratory distress. Breath sounds: No stridor. No wheezing, rhonchi or rales. Abdominal:      General: There is no distension. Palpations: Abdomen is soft. There is no mass. Tenderness: There is abdominal tenderness (TTP in epigastrum/ruq worse in epigatrum w/+murphys). There is no guarding or rebound. Hernia: No hernia is present. Musculoskeletal:      Right lower leg: No edema. Left lower leg: No edema. Skin:     General: Skin is warm and dry. Neurological:      Mental Status: She is alert. Mental status is at baseline. Psychiatric:         Mood and Affect: Mood normal.          Additional Data:     Lab Results:  Results from last 7 days   Lab Units 08/13/23  2343   WBC Thousand/uL 5.70   HEMOGLOBIN g/dL 13.7   HEMATOCRIT % 40.6   PLATELETS Thousands/uL 272   NEUTROS PCT % 68   LYMPHS PCT % 21   MONOS PCT % 9   EOS PCT % 2     Results from last 7 days   Lab Units 08/13/23  2343   SODIUM mmol/L 137   POTASSIUM mmol/L 3.5   CHLORIDE mmol/L 107   CO2 mmol/L 22   BUN mg/dL 10   CREATININE mg/dL 0.83   ANION GAP mmol/L 8   CALCIUM mg/dL 9.0   ALBUMIN g/dL 3.8   TOTAL BILIRUBIN mg/dL 0.68   ALK PHOS U/L 47   ALT U/L 101*   AST U/L 149*   GLUCOSE RANDOM mg/dL 111                       Lines/Drains:  Invasive Devices     Peripheral Intravenous Line  Duration           Peripheral IV 08/13/23 Distal;Right;Ventral (anterior) Forearm <1 day                    Imaging: Reviewed radiology reports from this admission including: abdominal/pelvic CT  CT abdomen pelvis with contrast   Final Result by Imtiaz Finney DO (08/14 8484)      Prominence of the pancreas is noted with some peripancreatic inflammatory changes seen in the region of the pancreatic head and neck.  A small amount of fluid tracks along the peritoneal reflection; findings taken together suspicious for acute    pancreatitis with associated acute peripancreatic collection. Correlation with the patient's symptoms and laboratory values recommended. No discrete evidence of organizing fluid collection or pancreatic necrosis is seen. Mildly distended gallbladder, mild gallbladder wall thickening, nonspecific, possibly reactive. Renal cysts, status post gastric sleeve, enlarged fibroid uterus, and other findings as above. Workstation performed: [id number]             EKG and Other Studies Reviewed on Admission:   · EKG:     ** Please Note: This note has been constructed using a voice recognition system.  **

## 2023-08-15 ENCOUNTER — TELEPHONE (OUTPATIENT)
Dept: FAMILY MEDICINE CLINIC | Facility: CLINIC | Age: 38
End: 2023-08-15

## 2023-08-15 VITALS
WEIGHT: 241.62 LBS | BODY MASS INDEX: 47.44 KG/M2 | TEMPERATURE: 98.7 F | RESPIRATION RATE: 16 BRPM | HEIGHT: 60 IN | OXYGEN SATURATION: 94 % | SYSTOLIC BLOOD PRESSURE: 134 MMHG | DIASTOLIC BLOOD PRESSURE: 90 MMHG | HEART RATE: 71 BPM

## 2023-08-15 PROCEDURE — 99239 HOSP IP/OBS DSCHRG MGMT >30: CPT | Performed by: INTERNAL MEDICINE

## 2023-08-15 RX ORDER — POLYETHYLENE GLYCOL 3350 17 G/17G
17 POWDER, FOR SOLUTION ORAL DAILY
Qty: 30 EACH | Refills: 0 | Status: SHIPPED | OUTPATIENT
Start: 2023-08-15

## 2023-08-15 RX ADMIN — SODIUM CHLORIDE, SODIUM GLUCONATE, SODIUM ACETATE, POTASSIUM CHLORIDE AND MAGNESIUM CHLORIDE 75 ML/HR: 526; 502; 368; 37; 30 INJECTION, SOLUTION INTRAVENOUS at 05:03

## 2023-08-15 RX ADMIN — PANTOPRAZOLE SODIUM 20 MG: 20 TABLET, DELAYED RELEASE ORAL at 05:06

## 2023-08-15 RX ADMIN — ENOXAPARIN SODIUM 40 MG: 100 INJECTION SUBCUTANEOUS at 08:01

## 2023-08-15 NOTE — TELEPHONE ENCOUNTER
Please schedule TCM appointment. Verify how patient is feeling and if they are in need of anything before their visit. Please send appt date and patient questions/concerns to [facility] to complete TCM.

## 2023-08-15 NOTE — DISCHARGE SUMMARY
Discharge Summary - Alton Reed, 1985, [id number]        Admission Date: 8/13/2023  Discharge Date: 8/15/2023      Discharge Diagnosis:   1. Acute pancreatitis  2. Mildly elevated liver enzymes  3. Obesity, status post sleeve gastrectomy  4. Type 2 diabetes    Consulting Physicians:  1. Dr. Shun Torres, gastroenterology  2. Dr. Hoa Lane, bariatric surgery    Procedures Performed:   None    HPI: The patient is a 79-year-old woman with a history of obesity and recent sleeve gastrectomy who developed epigastric pain radiating to her back after eating dinner on the evening of admission. Because of the severity of this pain, she came to the emergency room and was found to have a lipase of 10,000. CT scan showed evidence of pancreatitis. She was admitted for further care. Hospital Course: The patient was admitted to the hospital and hydrated vigorously with intravenous fluid. Analgesics and antiemetics were provided. The patient improved rather rapidly with resolution of her abdominal pain. Her diet was increased without incident to a bariatric maintenance diet. The patient underwent ultrasonography of her right upper quadrant which revealed gallbladder sludge and a mildly thickened gallbladder wall. No common bile duct stone was noted. The patient was evaluated by gastroenterology. The cause of her pancreatitis was not definitively established. The patient's calcium was normal.  Her triglycerides were normal.  An IgG4 level is currently pending. Outpatient MRCP was recommended. The patient was evaluated by bariatric surgery. No intervention was deemed necessary. The patient has lost 40 pounds since her surgery. The patient has a history of type 2 diabetes which has essentially gone into remission following her bariatric surgery. At the time of discharge the patient was feeling well. Vital signs were stable. Lungs were clear. Cardiac exam revealed a regular rhythm.   The abdomen was soft and nontender. There was no edema. Disposition: The patient was discharged home on August 15. She will continue her bariatric maintenance diet. Her activity is as tolerated. Outpatient MRCP will be needed. She was asked to make arrangements for primary care follow-up within 1 week. She has an appointment with bariatric surgery in the near future. Discharge instructions/Information to patient and family:   See after visit summary for information provided to patient and family. Provisions for Follow-Up Care:  See after visit summary for information related to follow-up care and any pertinent home health orders. Planned Readmission: No    Discharge Statement   I spent 40 minutes discharging the patient. This time was spent on the day of discharge. I had direct contact with the patient on the day of discharge. Discharge Medications:  See after visit summary for reconciled discharge medications provided to patient and family.

## 2023-08-15 NOTE — PLAN OF CARE
Problem: Potential for Falls  Goal: Patient will remain free of falls  Description: INTERVENTIONS:  - Educate patient/family on patient safety including physical limitations  - Instruct patient to call for assistance with activity   - Consult OT/PT to assist with strengthening/mobility   - Keep Call bell within reach  - Keep bed low and locked with side rails adjusted as appropriate  - Keep care items and personal belongings within reach  - Initiate and maintain comfort rounds  - Make Fall Risk Sign visible to staff  - Apply yellow socks and bracelet for high fall risk patients  - Consider moving patient to room near nurses station  Outcome: Progressing     Problem: PAIN - ADULT  Goal: Verbalizes/displays adequate comfort level or baseline comfort level  Description: Interventions:  - Encourage patient to monitor pain and request assistance  - Assess pain using appropriate pain scale  - Administer analgesics based on type and severity of pain and evaluate response  - Implement non-pharmacological measures as appropriate and evaluate response  - Consider cultural and social influences on pain and pain management  - Notify physician/advanced practitioner if interventions unsuccessful or patient reports new pain  Outcome: Progressing     Problem: SAFETY ADULT  Goal: Patient will remain free of falls  Description: INTERVENTIONS:  - Educate patient/family on patient safety including physical limitations  - Instruct patient to call for assistance with activity   - Consult OT/PT to assist with strengthening/mobility   - Keep Call bell within reach  - Keep bed low and locked with side rails adjusted as appropriate  - Keep care items and personal belongings within reach  - Initiate and maintain comfort rounds  - Make Fall Risk Sign visible to staff  - Apply yellow socks and bracelet for high fall risk patients  - Consider moving patient to room near nurses station  Outcome: Progressing  Goal: Maintain or return to baseline ADL function  Description: INTERVENTIONS:  - Educate patient/family on patient safety including physical limitations  - Instruct patient to call for assistance with activity   - Consult OT/PT to assist with strengthening/mobility   - Keep Call bell within reach  - Keep bed low and locked with side rails adjusted as appropriate  - Keep care items and personal belongings within reach  - Initiate and maintain comfort rounds  - Make Fall Risk Sign visible to staff  - Apply yellow socks and bracelet for high fall risk patients  - Consider moving patient to room near nurses station  Outcome: Progressing  Goal: Maintains/Returns to pre admission functional level  Description: INTERVENTIONS:  - Perform BMAT or MOVE assessment daily.   - Set and communicate daily mobility goal to care team and patient/family/caregiver. - Collaborate with rehabilitation services on mobility goals if consulted  - Perform Range of Motion  times a day. - Reposition patient every  hours.   - Dangle patient  times a day  - Stand patient  times a day  - Ambulate patient  times a day  - Out of bed to chair  times a day   - Out of bed for me times a day  - Out of bed for toileting  - Record patient progress and toleration of activity level   Outcome: Progressing     Problem: DISCHARGE PLANNING  Goal: Discharge to home or other facility with appropriate resources  Description: INTERVENTIONS:  - Identify barriers to discharge w/patient and caregiver  - Arrange for needed discharge resources and transportation as appropriate  - Identify discharge learning needs (meds, wound care, etc.)  - Arrange for interpretive services to assist at discharge as needed  - Refer to Case Management Department for coordinating discharge planning if the patient needs post-hospital services based on physician/advanced practitioner order or complex needs related to functional status, cognitive ability, or social support system  Outcome: Progressing     Problem: Knowledge Deficit  Goal: Patient/family/caregiver demonstrates understanding of disease process, treatment plan, medications, and discharge instructions  Description: Complete learning assessment and assess knowledge base.   Interventions:  - Provide teaching at level of understanding  - Provide teaching via preferred learning methods  Outcome: Progressing

## 2023-08-15 NOTE — PROGRESS NOTES
[facility] - Progress Note  Glenn Collier 43 y.o. female MRN: [id number]  Unit/Bed#: Jesse Ramirez -01 Encounter: [id number]      ASSESSMENT & PLAN:    42 y/o female with morbid obesity s/p sleeve gastrectomy 7/2023 presenting with acute interstitial pancreatitis. Pancreatitis  • Resolving  • Tolerating diet  • Etiology unclear, IgG4 in process and no evidence of gallstones or alcohol use  • Outpt f/u w PCP    Constipation  · Pt endorsing chronic constipation in outpt setting and today  · Recommend dc on miralax daily   · Outpt f/u w PCP    Pt is stable for dc from our perspective. We will sign off, please call us back if additional questions or concerns. ______________________________________________________________________    SUBJECTIVEMarlowe Crape. Tolerating diet. Scheduled Meds:  Current Facility-Administered Medications   Medication Dose Route Frequency Provider Last Rate   • enoxaparin  40 mg Subcutaneous Daily Adalgisa Gonzalez PA-C     • morphine injection  4 mg Intravenous Q4H PRN Merna Vernon PA-C     • multi-electrolyte  75 mL/hr Intravenous Continuous Allan Carson MD 75 mL/hr (08/15/23 0503)   • ondansetron  4 mg Intravenous Q6H PRN Adalgisa Gonzalez PA-C     • oxyCODONE  5 mg Oral Q4H PRN Adalgisa Gonzalez PA-C     • pantoprazole  20 mg Oral Early Morning Adalgisa Gonzalez PA-C       Continuous Infusions:multi-electrolyte, 75 mL/hr, Last Rate: 75 mL/hr (08/15/23 0503)      PRN Meds:.•  morphine injection  •  ondansetron  •  oxyCODONE    OBJECTIVE:     Objective   Blood pressure 134/90, pulse 71, temperature 98.7 °F (37.1 °C), resp. rate 16, height 5' (1.524 m), weight 110 kg (241 lb 10 oz), last menstrual period 07/23/2023, SpO2 94 %, not currently breastfeeding. Body mass index is 47.19 kg/m².     Intake/Output Summary (Last 24 hours) at 8/15/2023 1106  Last data filed at 8/15/2023 0503  Gross per 24 hour   Intake 2104 ml   Output --   Net 2104 ml PHYSICAL EXAM:   General Appearance: Awake and alert, in no acute distress  Abdomen: Soft, non-tender, non-distended; no masses or no organomegaly    Invasive Devices     Peripheral Intravenous Line  Duration           Peripheral IV 08/13/23 Distal;Right;Ventral (anterior) Forearm 1 day                LAB RESULTS:      Lab Units 08/14/23  0548 08/13/23  2343 08/01/23  0348 07/25/23  0507 05/12/23  1315 05/19/22  1242 05/19/22  1208   SODIUM mmol/L 137 137 135 138 138   < >  --    POTASSIUM mmol/L 3.2* 3.5 3.5 3.7 4.0   < >  --    CHLORIDE mmol/L 108 107 103 108 105   < >  --    CO2 mmol/L 23 22 18* 24 24   < >  --    BUN mg/dL 7 10 9 6 10   < >  --    CREATININE mg/dL 0.75 0.83 1.06 1.02 1.04   < >  --    GLUCOSE RANDOM mg/dL 102 111 106 106  --   --  194   CALCIUM mg/dL 8.5 9.0 9.2 8.7 9.0   < >  --     < > = values in this interval not displayed. Lab Units 08/14/23  0548 08/13/23  2343 08/01/23  0348 05/12/23  1315 12/19/22  0913   TOTAL PROTEIN g/dL 6.0* 6.9 7.5 7.0 7.9   ALBUMIN g/dL 3.4* 3.8 3.9 3.8 4.0   TOTAL BILIRUBIN mg/dL 0.57 0.68 0.67 0.46 0.45   AST U/L 109* 149* 48* 17 22   ALT U/L 99* 101* 73* 17 17   ALK PHOS U/L 43 47 55 33* 52           Lab Units 08/14/23  0548 08/13/23  2343 08/01/23  0348 07/25/23  0507 05/12/23  1315   WBC Thousand/uL 4.43 5.70 5.24 10.20* 4.26*   HEMOGLOBIN g/dL 12.9 13.7 14.1 13.4 13.7   HEMATOCRIT % 37.1 40.6 41.6 39.5 40.7   PLATELETS Thousands/uL 261 272 283 278 255   MCV fL 89 90 90 91 92       No results found for: "IRON", "TIBC", "FERRITIN"    Lab Results   Component Value Date    INR 1.05 08/01/2023    PROTIME 13.7 08/01/2023       RADIOLOGY RESULTS:   Procedure: US right upper quadrant    Result Date: 8/14/2023  Narrative: RIGHT UPPER QUADRANT ULTRASOUND INDICATION:     ruq pain epigastric pain w/gb wall thickening + rodriguez's sign eval for stones 2* pancreatitis. COMPARISON: Right upper quadrant abdominal sonogram from August 1, 2023.  CT of the abdomen and pelvis from today, August 14, 2023. TECHNIQUE:   Real-time ultrasound of the right upper quadrant was performed with a curvilinear transducer with both volumetric sweeps and still imaging techniques. FINDINGS: PANCREAS: Largely obscured by shadowing from overlying gastrointestinal gas. Imaged portions unremarkable. AORTA AND IVC:  Visualized portions are normal for patient age. LIVER: Size:  Within normal range. The liver measures 12.6 cm in the midclavicular line. Contour:  Surface contour is smooth. Parenchyma:  Echogenicity and echotexture are within normal limits. No evidence of mass. Main portal vein patent with hepatopetal flow. GALLBLADDER: No gallstones identified. Small amount of biliary sludge layering dependently. Gallbladder wall thickened, measuring 7 mm. No pericholecystic fluid. Sonographer reports no sonographic Marcial's sign. BILE DUCTS: No intrahepatic or extrahepatic bile duct dilatation. CBD measures 6 mm. No evidence of choledocholithiasis. KIDNEY: Right kidney measures 10.1 SAG x 5.1 AP x 5.8 TRV cm. No calculus, hydronephrosis or suspicious mass. ASCITES:   None. Impression: Biliary sludge in the gallbladder without evidence of cholelithiasis. Nonspecific gallbladder wall thickening, 7 mm in maximal thickness. Workstation performed: [id number]     Procedure: CT abdomen pelvis with contrast    Result Date: 8/14/2023  Narrative: CT ABDOMEN AND PELVIS WITH IV CONTRAST INDICATION:   Pancreatitis, acute, severe pancreatitis. COMPARISON: CT abdomen and pelvis dated 8/1/2023 TECHNIQUE:  CT examination of the abdomen and pelvis was performed. Multiplanar 2D reformatted images were created from the source data. This examination, like all CT scans performed in the [facility], was performed utilizing techniques to minimize radiation dose exposure, including the use of iterative reconstruction and automated exposure control.  Radiation dose length product (DLP) for this visit: 1137.98 mGy-cm IV Contrast:  100 mL of iohexol (OMNIPAQUE) Enteric Contrast:  Enteric contrast was not administered. FINDINGS: ABDOMEN LOWER CHEST:  No clinically significant abnormality identified in the visualized lower chest. LIVER/BILIARY TREE:  Unremarkable. GALLBLADDER: Mildly distended, mild gallbladder wall thickening, nonspecific, possibly reactive SPLEEN:  Unremarkable. PANCREAS: Prominence of the pancreas is noted with some peripancreatic inflammatory changes seen in the region of the pancreatic head and neck. A small amount of fluid tracks along the peritoneal reflection, no evidence of organizing fluid collection or pancreatic necrosis is seen. ADRENAL GLANDS:  Unremarkable. KIDNEYS/URETERS:  One or more simple renal cyst(s) is noted. Otherwise unremarkable kidneys. No hydronephrosis. STOMACH AND BOWEL: Status post gastric sleeve. No evidence of bowel obstruction. Otherwise grossly unremarkable. APPENDIX:  No findings to suggest appendicitis. ABDOMINOPELVIC CAVITY: No intraperitoneal free air. Shotty retroperitoneal lymph nodes, no bulky adenopathy. VESSELS:  Unremarkable for patient's age. PELVIS REPRODUCTIVE ORGANS: Enlarged fibroid uterus. URINARY BLADDER: Grossly unremarkable ABDOMINAL WALL/INGUINAL REGIONS: Postsurgical changes in the anterior abdominal wall OSSEOUS STRUCTURES:  No acute fracture or destructive osseous lesion. Impression: Prominence of the pancreas is noted with some peripancreatic inflammatory changes seen in the region of the pancreatic head and neck. A small amount of fluid tracks along the peritoneal reflection; findings taken together suspicious for acute pancreatitis with associated acute peripancreatic collection. Correlation with the patient's symptoms and laboratory values recommended. No discrete evidence of organizing fluid collection or pancreatic necrosis is seen. Mildly distended gallbladder, mild gallbladder wall thickening, nonspecific, possibly reactive. Renal cysts, status post gastric sleeve, enlarged fibroid uterus, and other findings as above. Workstation performed: [id number]     Narrative/Impressions - 3 day look back     Gretta Javier M.D.  PGY-5 Gastroenterology Fellow  [facility]  Available on Pat [email or web address]

## 2023-08-15 NOTE — UTILIZATION REVIEW
Initial Clinical Review    Admission: Date/Time/Statement:   Admission Orders (From admission, onward)     Ordered        08/14/23 0332  INPATIENT ADMISSION  Once                      Orders Placed This Encounter   Procedures   • INPATIENT ADMISSION     Standing Status:   Standing     Number of Occurrences:   1     Order Specific Question:   Level of Care     Answer:   Med Surg [16]     Order Specific Question:   Estimated length of stay     Answer:   More than 2 Midnights     Order Specific Question:   Certification     Answer:   I certify that inpatient services are medically necessary for this patient for a duration of greater than two midnights. See H&P and MD Progress Notes for additional information about the patient's course of treatment. ED Arrival Information     Expected   -    Arrival   8/13/2023 22:34    Acuity   Urgent            Means of arrival   Walk-In    Escorted by   Self    Service   Hospitalist    Admission type   Emergency            Arrival complaint   Chest/Back Pain            Chief Complaint   Patient presents with   • Chest Pain     Substernal chest pain that radiates around to upper back that started at 6pm. Took tums without relief. States pain is "sharp." Denies SOB. Had this pain 1 month ago and was told it was gas from having recent sleeve surgery. Initial Presentation: 43 y.o. female presented to ED as inpatient admission for acute pancreatitis. PMH of PMH of morbid obesity with BMI 52, with recent sleeve gastrectomy hypertension non-insulin-dependent diabetes mellitus who presents with epigastric pain radiating to back after eating dinner, started develop epigastric pain radiating to her back which is severe in nature and unrelenting with Tums She does note that 2 days prior to admission she has multiple episodes of loose to watery bowel movements which is since resolved. On exam abdominal tenderness (TTP in epigastrum/ruq worse in epigatrum w/+murphys).  PLan IVF NPO pain medication as needed IV PPI and transition to PO 08-15-23    Bariatrics consulted  42 y/o F with a history of morbid obesity s/p robotic sleeve gastrectomy on 7/24/2023 now admitted for epigastric abdominal pain radiating to the back following PO intake. Found to [place] at [address] and concern for pancreatitis. There was also evidence of stranding and dilated gallbladder on CT. Plan:  No indication for surgical intervention  Recommend RUQ to assess for gallbladder issues  Conservative management of pancreatitis       GI consulted   No history of pancreatitis, no alcohol or drug use, does note history of similar symptoms after her sleeve gastrectomy that self resolved. -Does notably have a history of recent sleeve gastrectomy July 2023.  -RUQ US: Biliary sludge in the gallbladder without evidence of cholelithiasis. Nonspecific gallbladder wall thickening, 7 mm in maximal thickness. -CT AP w/contrast: "Prominence of the pancreas is noted with some peripancreatic inflammatory changes seen in the region of the pancreatic head and neck. A small amount of fluid tracks along the peritoneal reflection; findings taken together suspicious for acute pancreatitis with associated acute peripancreatic collection. Correlation with the patient's symptoms and laboratory values recommended. No discrete evidence of organizing fluid collection or pancreatic necrosis is seen. Mildly distended gallbladder, mild gallbladder wall thickening, nonspecific, possibly reactive."  -On Protonix 20 mg daily. Plan:  -Unclear etiology of acute pancreatitis, no history of alcohol, triglycerides WNL, unclear gallstone pending RUQ US, no likely inciting causes of drug induced pancreatitis.  -Will check Ig G 4  -Supportive management at this time. -NPO currently, okay to advance diet as tolerated.   -Continue IVF.    08-15-23 starting low fat diet, transitioned to MA Protonix     ED Triage Vitals   Temperature Pulse Respirations Blood Pressure SpO2   08/13/23 2240 08/13/23 2240 08/13/23 2240 08/13/23 2243 08/13/23 2240   98.1 °F (36.7 °C) 88 18 (!) 190/105 97 %      Temp Source Heart Rate Source Patient Position - Orthostatic VS BP Location FiO2 (%)   08/13/23 2240 08/13/23 2240 08/13/23 2240 08/13/23 2240 --   Oral Monitor Sitting Right arm       Pain Score       08/13/23 2240       7          Wt Readings from Last 1 Encounters:   08/14/23 110 kg (241 lb 10 oz)     Additional Vital Signs  Date/Time Temp Pulse Resp BP MAP (mmHg) SpO2 O2 Device Patient Position - Orthostatic VS   08/15/23 07:10:04 98.7 °F (37.1 °C) 71 16 134/90 105 94 % -- --   08/14/23 21:19:37 98.4 °F (36.9 °C) 70 18 135/90 105 99 % None (Room air) Lying   08/14/23 20:54:26 98.2 °F (36.8 °C) 74 -- 135/90 105 97 % -- --   08/14/23 15:25:07 98.5 °F (36.9 °C) 64 16 137/89 105 98 % -- --   08/14/23 07:20:17 97.4 °F (36.3 °C) Abnormal  69 15 135/88 104 98 % -- --   08/14/23 0556 -- -- -- -- -- -- None (Room air) --   08/14/23 05:14:53 98.3 °F (36.8 °C) 69 18 136/89 105 99 % -- --   08/14/23 0426 -- 73 18 148/85 -- 99 % -- Lying   08/14/23 0147 -- 73 18 136/78 -- 96 % None (Room air) Lying   08/13/23 2346 -- 84 20 150/103 Abnormal  -- 100 % None (Room air) Lying                   Pertinent Labs/Diagnostic Test Results:   US right upper quadrant   (08/14 1035)      Biliary sludge in the gallbladder without evidence of cholelithiasis. Nonspecific gallbladder wall thickening, 7 mm in maximal thickness. CT abdomen pelvis with contrast    (08/14 0239)      Prominence of the pancreas is noted with some peripancreatic inflammatory changes seen in the region of the pancreatic head and neck. A small amount of fluid tracks along the peritoneal reflection; findings taken together suspicious for acute    pancreatitis with associated acute peripancreatic collection. Correlation with the patient's symptoms and laboratory values recommended.  No discrete evidence of organizing fluid collection or pancreatic necrosis is seen. Mildly distended gallbladder, mild gallbladder wall thickening, nonspecific, possibly reactive. Renal cysts, status post gastric sleeve, enlarged fibroid uterus, and other findings as above.          Results from last 7 days   Lab Units 08/14/23  0548 08/13/23  2343   WBC Thousand/uL 4.43 5.70   HEMOGLOBIN g/dL 12.9 13.7   HEMATOCRIT % 37.1 40.6   PLATELETS Thousands/uL 261 272   NEUTROS ABS Thousands/µL 2.95 3.82         Results from last 7 days   Lab Units 08/14/23  0548 08/13/23  2343   SODIUM mmol/L 137 137   POTASSIUM mmol/L 3.2* 3.5   CHLORIDE mmol/L 108 107   CO2 mmol/L 23 22   ANION GAP mmol/L 6 8   BUN mg/dL 7 10   CREATININE mg/dL 0.75 0.83   EGFR ml/min/1.73sq m 100 89   CALCIUM mg/dL 8.5 9.0     Results from last 7 days   Lab Units 08/14/23  0548 08/13/23  2343   AST U/L 109* 149*   ALT U/L 99* 101*   ALK PHOS U/L 43 47   TOTAL PROTEIN g/dL 6.0* 6.9   ALBUMIN g/dL 3.4* 3.8   TOTAL BILIRUBIN mg/dL 0.57 0.68         Results from last 7 days   Lab Units 08/14/23  0548 08/13/23  2343   GLUCOSE RANDOM mg/dL 102 111     Results from last 7 days   Lab Units 08/13/23  2343   HS TNI 0HR ng/L 5       Results from last 7 days   Lab Units 08/13/23  2343   LIPASE u/L 10,019*     ED Treatment:   Medication Administration from 08/13/2023 2234 to 08/14/2023 0507       Date/Time Order Dose Route Action     08/13/2023 2343 EDT Famotidine (PF) (PEPCID) injection 20 mg 20 mg Intravenous Given     08/13/2023 2343 EDT morphine injection 4 mg 4 mg Intravenous Given     08/14/2023 0209 EDT iohexol (OMNIPAQUE) 350 MG/ML injection (SINGLE-DOSE) 100 mL 100 mL Intravenous Given        Past Medical History:   Diagnosis Date   • Diabetes mellitus ([address])    • Hypertension    • Obesity    • Periodontal disease    • Risk factors for obstructive sleep apnea 08/08/2022   • Seasonal allergies      Present on Admission:  • Acute pancreatitis  • Transaminitis  • Type 2 diabetes mellitus without complication, without long-term current use of insulin (HCC)      Admitting Diagnosis: Pancreatitis [K85.90]  Chest pain [R07.9]  Age/Sex: 43 y.o. female     Admission Orders:  NPO  SCD      Scheduled Medications:  enoxaparin, 40 mg, Subcutaneous, Dailya  pantoprazole, 20 mg, Oral, Early Morning  IV Famotidine (PF) (PEPCID) injection 20 mg x 1     Continuous IV Infusions:  multi-electrolyte, 75 mL/hr, Intravenous, Continuous      PRN Meds:  morphine injection, 4 mg, Intravenous, Q4H PRN  ondansetron, 4 mg, Intravenous, Q6H PRN  oxyCODONE, 5 mg, Oral, Q4H PRN  X 1         IP CONSULT TO GASTROENTEROLOGY    Network Utilization Review Department  ATTENTION: Please call with any questions or concerns to [phone] and carefully listen to the prompts so that you are directed to the right person. All voicemails are confidential.  [place] all requests for admission clinical reviews, approved or denied determinations and any other requests to dedicated fax number below belonging to the campus where the patient is receiving treatment.  List of dedicated fax numbers for the Facilities:  [place] DENIALS (Administrative/Medical Necessity) [phone] [facility] [facility] [phone]   [address] [place] [address] [phone]   [facility] [address] [facility] [phone] [id number] [place] [address] [phone]   [address] [address] [phone]

## 2023-08-15 NOTE — PLAN OF CARE
Problem: Potential for Falls  Goal: Patient will remain free of falls  Description: INTERVENTIONS:  - Educate patient/family on patient safety including physical limitations  - Instruct patient to call for assistance with activity   - Consult OT/PT to assist with strengthening/mobility   - Keep Call bell within reach  - Keep bed low and locked with side rails adjusted as appropriate  - Keep care items and personal belongings within reach  - Initiate and maintain comfort rounds  - Make Fall Risk Sign visible to staff  - Apply yellow socks and bracelet for high fall risk patients  - Consider moving patient to room near nurses station  Outcome: Progressing     Problem: PAIN - ADULT  Goal: Verbalizes/displays adequate comfort level or baseline comfort level  Description: Interventions:  - Encourage patient to monitor pain and request assistance  - Assess pain using appropriate pain scale  - Administer analgesics based on type and severity of pain and evaluate response  - Implement non-pharmacological measures as appropriate and evaluate response  - Consider cultural and social influences on pain and pain management  - Notify physician/advanced practitioner if interventions unsuccessful or patient reports new pain  Outcome: Progressing     Problem: SAFETY ADULT  Goal: Patient will remain free of falls  Description: INTERVENTIONS:  - Educate patient/family on patient safety including physical limitations  - Instruct patient to call for assistance with activity   - Consult OT/PT to assist with strengthening/mobility   - Keep Call bell within reach  - Keep bed low and locked with side rails adjusted as appropriate  - Keep care items and personal belongings within reach  - Initiate and maintain comfort rounds  - Make Fall Risk Sign visible to staff  - Apply yellow socks and bracelet for high fall risk patients  - Consider moving patient to room near nurses station  Outcome: Progressing  Goal: Maintain or return to baseline ADL function  Description: INTERVENTIONS:  - Educate patient/family on patient safety including physical limitations  - Instruct patient to call for assistance with activity   - Consult OT/PT to assist with strengthening/mobility   - Keep Call bell within reach  - Keep bed low and locked with side rails adjusted as appropriate  - Keep care items and personal belongings within reach  - Initiate and maintain comfort rounds  - Make Fall Risk Sign visible to staff  - Apply yellow socks and bracelet for high fall risk patients  - Consider moving patient to room near nurses station  Outcome: Progressing  Goal: Maintains/Returns to pre admission functional level  Description: INTERVENTIONS:  - Perform BMAT or MOVE assessment daily.   - Set and communicate daily mobility goal to care team and patient/family/caregiver. - Collaborate with rehabilitation services on mobility goals if consulted  - Perform Range of Motion 3 times a day. - Reposition patient every 2 hours.   - Dangle patient 3 times a day  - Stand patient 3 times a day  - Ambulate patient 2 times a day  - Out of bed to chair 2 times a day   - Out of bed for meals 2 times a day  - Out of bed for toileting  - Record patient progress and toleration of activity level   Outcome: Progressing     Problem: DISCHARGE PLANNING  Goal: Discharge to home or other facility with appropriate resources  Description: INTERVENTIONS:  - Identify barriers to discharge w/patient and caregiver  - Arrange for needed discharge resources and transportation as appropriate  - Identify discharge learning needs (meds, wound care, etc.)  - Arrange for interpretive services to assist at discharge as needed  - Refer to Case Management Department for coordinating discharge planning if the patient needs post-hospital services based on physician/advanced practitioner order or complex needs related to functional status, cognitive ability, or social support system  Outcome: Progressing     Problem: Knowledge Deficit  Goal: Patient/family/caregiver demonstrates understanding of disease process, treatment plan, medications, and discharge instructions  Description: Complete learning assessment and assess knowledge base.   Interventions:  - Provide teaching at level of understanding  - Provide teaching via preferred learning methods  Outcome: Progressing

## 2023-08-16 LAB
IGG SERPL-MCNC: 1035 MG/DL (ref 586–1602)
IGG1 SER-MCNC: 641 MG/DL (ref 248–810)
IGG2 SER-MCNC: 317 MG/DL (ref 130–555)
IGG3 SER-MCNC: 53 MG/DL (ref 15–102)
IGG4 SER-MCNC: 8 MG/DL (ref 2–96)

## 2023-08-18 ENCOUNTER — OFFICE VISIT (OUTPATIENT)
Dept: FAMILY MEDICINE CLINIC | Facility: CLINIC | Age: 38
End: 2023-08-18

## 2023-08-18 ENCOUNTER — PATIENT OUTREACH (OUTPATIENT)
Dept: FAMILY MEDICINE CLINIC | Facility: CLINIC | Age: 38
End: 2023-08-18

## 2023-08-18 ENCOUNTER — APPOINTMENT (OUTPATIENT)
Dept: LAB | Facility: CLINIC | Age: 38
End: 2023-08-18
Payer: MEDICARE

## 2023-08-18 VITALS
BODY MASS INDEX: 46.92 KG/M2 | OXYGEN SATURATION: 98 % | HEART RATE: 77 BPM | RESPIRATION RATE: 18 BRPM | HEIGHT: 60 IN | DIASTOLIC BLOOD PRESSURE: 76 MMHG | SYSTOLIC BLOOD PRESSURE: 132 MMHG | TEMPERATURE: 97.9 F | WEIGHT: 239 LBS

## 2023-08-18 DIAGNOSIS — Z78.9 NEED FOR FOLLOW-UP BY SOCIAL WORKER: Primary | ICD-10-CM

## 2023-08-18 DIAGNOSIS — K85.90 ACUTE PANCREATITIS, UNSPECIFIED COMPLICATION STATUS, UNSPECIFIED PANCREATITIS TYPE: Primary | ICD-10-CM

## 2023-08-18 DIAGNOSIS — R10.13 DYSPEPSIA: ICD-10-CM

## 2023-08-18 DIAGNOSIS — K85.90 ACUTE PANCREATITIS, UNSPECIFIED COMPLICATION STATUS, UNSPECIFIED PANCREATITIS TYPE: ICD-10-CM

## 2023-08-18 LAB
ALBUMIN SERPL BCP-MCNC: 3.3 G/DL (ref 3.5–5)
ALP SERPL-CCNC: 54 U/L (ref 46–116)
ALT SERPL W P-5'-P-CCNC: 72 U/L (ref 12–78)
ANION GAP SERPL CALCULATED.3IONS-SCNC: 1 MMOL/L
AST SERPL W P-5'-P-CCNC: 23 U/L (ref 5–45)
BILIRUB SERPL-MCNC: 0.4 MG/DL (ref 0.2–1)
BUN SERPL-MCNC: 5 MG/DL (ref 5–25)
CALCIUM ALBUM COR SERPL-MCNC: 10.5 MG/DL (ref 8.3–10.1)
CALCIUM SERPL-MCNC: 9.9 MG/DL (ref 8.3–10.1)
CHLORIDE SERPL-SCNC: 112 MMOL/L (ref 96–108)
CO2 SERPL-SCNC: 26 MMOL/L (ref 21–32)
CREAT SERPL-MCNC: 0.98 MG/DL (ref 0.6–1.3)
GFR SERPL CREATININE-BSD FRML MDRD: 72 ML/MIN/1.73SQ M
GLUCOSE P FAST SERPL-MCNC: 90 MG/DL (ref 65–99)
LIPASE SERPL-CCNC: 794 U/L (ref 73–393)
POTASSIUM SERPL-SCNC: 4 MMOL/L (ref 3.5–5.3)
PROT SERPL-MCNC: 7.6 G/DL (ref 6.4–8.4)
SODIUM SERPL-SCNC: 139 MMOL/L (ref 135–147)

## 2023-08-18 PROCEDURE — 99495 TRANSJ CARE MGMT MOD F2F 14D: CPT | Performed by: FAMILY MEDICINE

## 2023-08-18 PROCEDURE — 83690 ASSAY OF LIPASE: CPT

## 2023-08-18 PROCEDURE — 80053 COMPREHEN METABOLIC PANEL: CPT

## 2023-08-18 PROCEDURE — 36415 COLL VENOUS BLD VENIPUNCTURE: CPT

## 2023-08-18 RX ORDER — SIMETHICONE 180 MG
180 CAPSULE ORAL EVERY 6 HOURS PRN
Qty: 30 CAPSULE | Refills: 0 | Status: SHIPPED | OUTPATIENT
Start: 2023-08-18

## 2023-08-18 NOTE — PROGRESS NOTES
Assessment & Plan     1. Acute pancreatitis, unspecified complication status, unspecified pancreatitis type  Assessment & Plan:  Gi follow up pending  Needs MRCP  Continue with current conservative management  Bariatric soft diet and precautions  Trial of Simethicone  Repeat CMP and Lipase    Orders:  -     Comprehensive metabolic panel; Future  -     Lipase; Future  -     Ambulatory Referral to Gastroenterology; Future    2. Dyspepsia  -     simethicone (MYLICON,GAS-X) 406 MG capsule; Take 1 capsule (180 mg total) by mouth every 6 (six) hours as needed for flatulence         Subjective     Transitional Care Management Review:   Anju Boo is a 43 y.o. female here for TCM follow up. During the TCM phone call patient stated:  TCM Call     None      TCM Call     None        HPI   Anju Boo is a 43 y.o. female  who presented to the office today for a hospitalization follow up. She was admitted from 8/13/2023-8/15/2023 for pancreatitis. She had Gastric Sleeve Surgery on 8/75/02 which was complicated by mild pancreatitis intitally on 8/1/2023. The pateint continued to feel unwell and visited the ER again on 8/13/2023 with abdominal and chest pain and found to have more severe pancreatitis. Pt today states is feeling better, she does have mild pain in her abdomen when sneezing or certain movements. She hasbeen using ginger tea and peppermint teas to help with the bloating. Pt informed of fibroid uterus. The following portions of the patient's history were reviewed and updated as appropriate: allergies, current medications, past family history, past medical history, past social history, past surgical history and problem list.      Review of Systems   Constitutional: Negative for chills and fever. HENT: Negative for congestion, rhinorrhea and sore throat. Respiratory: Negative for cough and shortness of breath. Cardiovascular: Negative for chest pain.    Gastrointestinal: Positive for abdominal pain. Negative for diarrhea, nausea and vomiting. Skin: Negative for rash. Neurological: Negative for dizziness and headaches. Objective     /76 (BP Location: Left arm, Patient Position: Sitting, Cuff Size: Large)   Pulse 77   Temp 97.9 °F (36.6 °C) (Temporal)   Resp 18   Ht 5' (1.524 m)   Wt 108 kg (239 lb)   LMP 07/23/2023   SpO2 98%   BMI 46.68 kg/m²      Physical Exam  Vitals and nursing note reviewed. Constitutional:       General: She is not in acute distress. Appearance: She is well-developed. HENT:      Head: Normocephalic and atraumatic. Eyes:      Conjunctiva/sclera: Conjunctivae normal.   Cardiovascular:      Rate and Rhythm: Normal rate and regular rhythm. Heart sounds: No murmur heard. Pulmonary:      Effort: Pulmonary effort is normal. No respiratory distress. Breath sounds: Normal breath sounds. Abdominal:      General: Abdomen is protuberant. Bowel sounds are normal. There is no distension. Palpations: Abdomen is soft. Tenderness: There is no abdominal tenderness. Comments: Well healing surgical scars  nontender abdomen   Musculoskeletal:         General: No swelling. Cervical back: Neck supple. Skin:     General: Skin is warm and dry. Capillary Refill: Capillary refill takes less than 2 seconds. Neurological:      Mental Status: She is alert.    Psychiatric:         Mood and Affect: Mood normal.         Behavior: Behavior normal.       Medications have been reviewed by provider in current encounter    John Pat MD

## 2023-08-18 NOTE — ASSESSMENT & PLAN NOTE
Gi follow up pending  Needs MRCP  Continue with current conservative management  Bariatric soft diet and precautions  Trial of Simethicone  Repeat CMP and Lipase

## 2023-08-18 NOTE — PROGRESS NOTES
EMELYN MILLER received an in-basket message requesting assistance with getting pt transport to her Gastro appointment on 8/24. EMELYN MILLER called pt to follow up. Pt confirmed that she has [employer] as her insurer. EMELYN MILLER inquired if she had reached out to them and she stated that she did not. EMELYN MILLER provided pt with the number for [facility] to contact about setting up transportation. Pt and EMELYN MILLER discussed that if pt was unable to secure transportation she will contact EMELYN MILLER. EMELYN MILLER provided pt with direct number to contact. EMELYN MILLER inquired if pt had any other questions or concerns at this time and she declined. EMELYN MILLER will be available for any additional needs as requested.

## 2023-08-21 ENCOUNTER — TELEPHONE (OUTPATIENT)
Age: 38
End: 2023-08-21

## 2023-08-21 ENCOUNTER — PATIENT OUTREACH (OUTPATIENT)
Dept: FAMILY MEDICINE CLINIC | Facility: CLINIC | Age: 38
End: 2023-08-21

## 2023-08-21 NOTE — PROGRESS NOTES
EMELYN MILLER called  [facility] to see if they would be able to assist her with getting transportation to her appointment. EMELYN MILLER was made aware they where able to assist and would call her to set up transport. EMELYN MILLER will continue to be available for any additional needs as requested.

## 2023-08-24 ENCOUNTER — OFFICE VISIT (OUTPATIENT)
Dept: GASTROENTEROLOGY | Facility: CLINIC | Age: 38
End: 2023-08-24
Payer: MEDICARE

## 2023-08-24 VITALS
TEMPERATURE: 98.5 F | BODY MASS INDEX: 46.53 KG/M2 | HEIGHT: 60 IN | SYSTOLIC BLOOD PRESSURE: 120 MMHG | WEIGHT: 237 LBS | DIASTOLIC BLOOD PRESSURE: 70 MMHG

## 2023-08-24 DIAGNOSIS — K85.90 ACUTE PANCREATITIS, UNSPECIFIED COMPLICATION STATUS, UNSPECIFIED PANCREATITIS TYPE: ICD-10-CM

## 2023-08-24 PROCEDURE — 99214 OFFICE O/P EST MOD 30 MIN: CPT | Performed by: PHYSICIAN ASSISTANT

## 2023-08-24 NOTE — PROGRESS NOTES
[facility] - Outpatient Consultation  Philly Torrez 43 y.o. female MRN: [id number]  Encounter: [id number]          ASSESSMENT AND PLAN:      Nahum Cruz is a 42 y/o female who is s/p sleeve gastrectomy 7/24/2023 with GERD, DM2, MARIA LUISA who presents for hospital follow-up. 1. Acute pancreatitis   Pt presented to the ED last week for epigastric pain and found to have acute pancreatitis; CT noted "Prominence of the pancreas is noted with some peripancreatic inflammatory changes seen in the region of the pancreatic head and neck. A small amount of fluid tracks along the peritoneal reflection; findings taken together suspicious for acute pancreatitis with associated acute peripancreatic collection." lipase was >10 K and LFTs were elevated however LFTs started to trend down. IGG panel WNL. U/s noted GB sludge and nonspecific GB wall thickening. Pt referred to [facility] to get MRCP done. -repeat LFTs  -MRI/MRCP ordered  -explained to pt that if MRI/MRCP also noted GB sludge and thickening, even without stones, I would still want her to have a discussion with our surgery colleagues. If MRI/MRCP does not show any GB abnormalities, then she may warrant EUS if she has any recurrent episodes of this    2. Chronic constipation  Pt says she has not yet started miralax but has it at home; she moves her bowels once every few days and says that she is "gassy" until she has a BM. -increase daily water intake  -start using the miralax daily PRN constipation     ______________________________________________________________________    HPI:  Nahum Cruz is a 42 y/o female with GERD, DM2, MARIA LUISA who presents for hospital follow-up. Pt says since leaving the hospital, she has not had any further pain. She says she is tolerating a regular diet for the most part, though she is eating what her weight management team suggested.  Pt says she does get gassy on the days that she is not moving her bowels, but gas-x helps with this. She has not yet tried miralax, but has it at home. She says she moves her bowels once every few days. Pt denies vomiting, dysphagia, diarrhea, unintentional weight loss, fevers, chills, night sweats, bloody or black BMs. REVIEW OF SYSTEMS:    CONSTITUTIONAL: Denies any fever, chills, rigors, and weight loss. HEENT: No earache or tinnitus. Denies hearing loss or visual disturbances. CARDIOVASCULAR: No chest pain or palpitations. RESPIRATORY: Denies any cough, hemoptysis, shortness of breath or dyspnea on exertion. GASTROINTESTINAL: As noted in the History of Present Illness. GENITOURINARY: No problems with urination. Denies any hematuria or dysuria. NEUROLOGIC: No dizziness or vertigo, denies headaches. MUSCULOSKELETAL: Denies any muscle or joint pain. SKIN: Denies skin rashes or itching. ENDOCRINE: Denies excessive thirst. Denies intolerance to heat or cold. PSYCHOSOCIAL: Denies depression or anxiety. Denies any recent memory loss.        Historical Information   Past Medical History:   Diagnosis Date   • Diabetes mellitus ([address])    • Hypertension    • Obesity    • Periodontal disease    • Risk factors for obstructive sleep apnea 08/08/2022   • Seasonal allergies      Past Surgical History:   Procedure Laterality Date   • CATARACT EXTRACTION Left 06/08/2022   • CATARACT EXTRACTION Right 11/10/2021   • COLONOSCOPY  2013   • NE LAPS [facility] RSTRICTIV PX LONGITUDINAL GASTRECTOMY N/A 7/24/2023    Procedure: GASTRECTOMY SLEEVE W/ ROBOT & INTRAOPERATIVE EGD;  Surgeon: Chetna Roberts MD;  Location: [facility];  Service: Bariatrics     Social History   Social History     Substance and Sexual Activity   Alcohol Use Never     Social History     Substance and Sexual Activity   Drug Use Never     Social History     Tobacco Use   Smoking Status Never   • Passive exposure: Never   Smokeless Tobacco Never     Family History   Problem Relation Age of Onset   • Obesity Mother    • Sleep apnea Neg Hx Meds/Allergies       Current Outpatient Medications:   •  Blood Glucose Monitoring Suppl (OneTouch Verio Flex System) w/Device KIT  •  Blood Pressure Monitoring (Adult Blood Pressure Cuff Lg) KIT  •  Insulin Pen Needle 30G X 8 MM MISC  •  Multiple Vitamins-Minerals (multivitamin with minerals) tablet  •  omeprazole (PriLOSEC) 20 mg delayed release capsule  •  OneTouch Delica Lancets 37H MISC  •  oxyCODONE (Roxicodone) 5 immediate release tablet  •  polyethylene glycol (MIRALAX) 17 g packet  •  simethicone (MYLICON,GAS-X) 687 MG capsule    No Known Allergies        Objective     Last menstrual period 07/23/2023, not currently breastfeeding. There is no height or weight on file to calculate BMI. PHYSICAL EXAM:      General Appearance:   Alert, cooperative, no distress   HEENT:   Normocephalic, atraumatic, anicteric.     Neck:  Supple, symmetrical, trachea midline   Lungs:   Clear to auscultation bilaterally; no rales, rhonchi or wheezing; respirations unlabored    Heart[de-identified]   Regular rate and rhythm; no murmur, rub, or gallop. Abdomen:   Soft, non-tender, non-distended; normal bowel sounds; no masses, no organomegaly    Genitalia:   Deferred    Rectal:   Deferred    Extremities:  No cyanosis, clubbing or edema    Pulses:  2+ and symmetric    Skin:  No jaundice, rashes, or lesions    Lymph nodes:  No palpable cervical lymphadenopathy        Lab Results:   No visits with results within 1 Day(s) from this visit.    Latest known visit with results is:   Appointment on 08/18/2023   Component Date Value   • Sodium 08/18/2023 139    • Potassium 08/18/2023 4.0    • Chloride 08/18/2023 112 (H)    • CO2 08/18/2023 26    • ANION GAP 08/18/2023 1    • BUN 08/18/2023 5    • Creatinine 08/18/2023 0.98    • Glucose, Fasting 08/18/2023 90    • Calcium 08/18/2023 9.9    • Corrected Calcium 08/18/2023 10.5 (H)    • AST 08/18/2023 23    • ALT 08/18/2023 72    • Alkaline Phosphatase 08/18/2023 54    • Total Protein 08/18/2023 7. 6    • Albumin 08/18/2023 3.3 (L)    • Total Bilirubin 08/18/2023 0.40    • eGFR 08/18/2023 72    • Lipase 08/18/2023 794 (H)          Radiology Results:   US right upper quadrant    Result Date: 8/14/2023  Narrative: RIGHT UPPER QUADRANT ULTRASOUND INDICATION:     ruq pain epigastric pain w/gb wall thickening + marcial's sign eval for stones 2* pancreatitis. COMPARISON: Right upper quadrant abdominal sonogram from August 1, 2023. CT of the abdomen and pelvis from today, August 14, 2023. TECHNIQUE:   Real-time ultrasound of the right upper quadrant was performed with a curvilinear transducer with both volumetric sweeps and still imaging techniques. FINDINGS: PANCREAS: Largely obscured by shadowing from overlying gastrointestinal gas. Imaged portions unremarkable. AORTA AND IVC:  Visualized portions are normal for patient age. LIVER: Size:  Within normal range. The liver measures 12.6 cm in the midclavicular line. Contour:  Surface contour is smooth. Parenchyma:  Echogenicity and echotexture are within normal limits. No evidence of mass. Main portal vein patent with hepatopetal flow. GALLBLADDER: No gallstones identified. Small amount of biliary sludge layering dependently. Gallbladder wall thickened, measuring 7 mm. No pericholecystic fluid. Sonographer reports no sonographic Marcial's sign. BILE DUCTS: No intrahepatic or extrahepatic bile duct dilatation. CBD measures 6 mm. No evidence of choledocholithiasis. KIDNEY: Right kidney measures 10.1 SAG x 5.1 AP x 5.8 TRV cm. No calculus, hydronephrosis or suspicious mass. ASCITES:   None. Impression: Biliary sludge in the gallbladder without evidence of cholelithiasis. Nonspecific gallbladder wall thickening, 7 mm in maximal thickness. Workstation performed: [id number]     CT abdomen pelvis with contrast    Result Date: 8/14/2023  Narrative: CT ABDOMEN AND PELVIS WITH IV CONTRAST INDICATION:   Pancreatitis, acute, severe pancreatitis.  COMPARISON: CT abdomen and pelvis dated 8/1/2023 TECHNIQUE:  CT examination of the abdomen and pelvis was performed. Multiplanar 2D reformatted images were created from the source data. This examination, like all CT scans performed in the [facility], was performed utilizing techniques to minimize radiation dose exposure, including the use of iterative reconstruction and automated exposure control. Radiation dose length product (DLP) for this visit:  1137.98 mGy-cm IV Contrast:  100 mL of iohexol (OMNIPAQUE) Enteric Contrast:  Enteric contrast was not administered. FINDINGS: ABDOMEN LOWER CHEST:  No clinically significant abnormality identified in the visualized lower chest. LIVER/BILIARY TREE:  Unremarkable. GALLBLADDER: Mildly distended, mild gallbladder wall thickening, nonspecific, possibly reactive SPLEEN:  Unremarkable. PANCREAS: Prominence of the pancreas is noted with some peripancreatic inflammatory changes seen in the region of the pancreatic head and neck. A small amount of fluid tracks along the peritoneal reflection, no evidence of organizing fluid collection or pancreatic necrosis is seen. ADRENAL GLANDS:  Unremarkable. KIDNEYS/URETERS:  One or more simple renal cyst(s) is noted. Otherwise unremarkable kidneys. No hydronephrosis. STOMACH AND BOWEL: Status post gastric sleeve. No evidence of bowel obstruction. Otherwise grossly unremarkable. APPENDIX:  No findings to suggest appendicitis. ABDOMINOPELVIC CAVITY: No intraperitoneal free air. Shotty retroperitoneal lymph nodes, no bulky adenopathy. VESSELS:  Unremarkable for patient's age. PELVIS REPRODUCTIVE ORGANS: Enlarged fibroid uterus. URINARY BLADDER: Grossly unremarkable ABDOMINAL WALL/INGUINAL REGIONS: Postsurgical changes in the anterior abdominal wall OSSEOUS STRUCTURES:  No acute fracture or destructive osseous lesion.      Impression: Prominence of the pancreas is noted with some peripancreatic inflammatory changes seen in the region of the pancreatic head and neck. A small amount of fluid tracks along the peritoneal reflection; findings taken together suspicious for acute pancreatitis with associated acute peripancreatic collection. Correlation with the patient's symptoms and laboratory values recommended. No discrete evidence of organizing fluid collection or pancreatic necrosis is seen. Mildly distended gallbladder, mild gallbladder wall thickening, nonspecific, possibly reactive. Renal cysts, status post gastric sleeve, enlarged fibroid uterus, and other findings as above. Workstation performed: [id number]     US right upper quadrant    Result Date: 8/1/2023  Narrative: RIGHT UPPER QUADRANT ULTRASOUND INDICATION:     per bariatrics request. COMPARISON: CTA chest/abdomen/pelvis 8/1/2023 TECHNIQUE:   Real-time ultrasound of the right upper quadrant was performed with a curvilinear transducer with both volumetric sweeps and still imaging techniques. FINDINGS: PANCREAS: Portions of the pancreas are obscured by bowel gas. Visualized portions of the pancreas are unremarkable. AORTA AND IVC:  Visualized portions are normal for patient age. LIVER: Size:  Within normal range. The liver measures 12.4 cm in the midclavicular line. Contour:  Surface contour is smooth. Parenchyma: There is mild diffuse increased echogenicity with smooth echotexture, without significant beam attenuation or loss of periportal echogenicity. Most consistent with mild hepatic steatosis. No liver mass identified. Limited imaging of the main portal vein shows it to be patent and hepatopetal. BILIARY: No gallbladder findings. No intrahepatic biliary dilatation. CBD measures 6.0 mm. No choledocholithiasis. KIDNEY: Right kidney measures 10.0 x 4.6 x 5.2 cm. Volume 125.3 mL Kidney within normal limits. ASCITES:   None. Impression: Mild hepatic steatosis. Top normal common bile duct measuring up to 6 mm, tapering to normal distally without choledocholithiasis, likely within normal limits. Workstation performed: [id number]     PE Study with CT abdomen & pelvis with contrast    Result Date: 8/1/2023  Narrative: CT PULMONARY ANGIOGRAM OF THE CHEST AND CT ABDOMEN AND PELVIS WITH INTRAVENOUS CONTRAST INDICATION:   gastric sleeve on 7/24, epigastric pain, CP, sob. COMPARISON:  None. TECHNIQUE:  CT examination of the chest, abdomen and pelvis was performed. Thin section CT angiographic technique was used in the chest in order to evaluate for pulmonary embolus and coronal 3D MIP postprocessing was performed on the acquisition scanner. Multiplanar 2D reformatted images were created from the source data. This examination, like all CT scans performed in the [facility], was performed utilizing techniques to minimize radiation dose exposure, including the use of iterative reconstruction and automated exposure control. Radiation dose length product (DLP) for this visit:  2099 mGy-cm IV Contrast:  5 mL of iohexol (OMNIPAQUE) 100 mL of iohexol (OMNIPAQUE) Enteric Contrast:  Enteric contrast was administered. FINDINGS: CHEST PULMONARY ARTERIAL TREE:  No pulmonary embolus identified to the level of the segmental pulmonary arteries. LUNGS:  Lungs are clear. There is no tracheal or endobronchial lesion. PLEURA:  Unremarkable. HEART/AORTA:  Heart is unremarkable for patient's age. No thoracic aortic aneurysm. MEDIASTINUM AND RUPERTO:  Unremarkable. CHEST WALL AND LOWER NECK:  Unremarkable. ABDOMEN LIVER/BILIARY TREE:  Unremarkable. GALLBLADDER:  No calcified gallstones. No pericholecystic inflammatory change. SPLEEN:  Unremarkable. PANCREAS:  Unremarkable. ADRENAL GLANDS:  Unremarkable. KIDNEYS/URETERS:  One or more simple renal cyst(s) is noted. Otherwise unremarkable kidneys. No hydronephrosis. STOMACH AND BOWEL: Sleeve gastrectomy. No evidence of complication or bowel obstruction. APPENDIX:  A normal appendix was visualized. ABDOMINOPELVIC CAVITY:  No ascites. No pneumoperitoneum.   No lymphadenopathy. VESSELS:  Unremarkable for patient's age. PELVIS REPRODUCTIVE ORGANS: Leiomyomatous uterus, the largest measuring up to 6.6 cm URINARY BLADDER:  Unremarkable. ABDOMINAL WALL/INGUINAL REGIONS:  Unremarkable. OSSEOUS STRUCTURES:  No acute fracture or destructive osseous lesion. Spinal degenerative changes are noted. Impression: No pulmonary embolus or other acute abnormality. Leiomyomatous uterus.  Workstation performed: [id number]

## 2023-08-24 NOTE — PATIENT INSTRUCTIONS
Please get MRI and blood work done  Please start to increase your daily water intake to 3-4 bottles/day  Please start using miralax daily as needed for constipation

## 2023-08-29 ENCOUNTER — OFFICE VISIT (OUTPATIENT)
Dept: BARIATRICS | Facility: CLINIC | Age: 38
End: 2023-08-29

## 2023-08-29 VITALS — HEIGHT: 60 IN | WEIGHT: 235.5 LBS | BODY MASS INDEX: 46.23 KG/M2

## 2023-08-29 DIAGNOSIS — E66.01 OBESITY, CLASS III, BMI 40-49.9 (MORBID OBESITY) (HCC): Primary | ICD-10-CM

## 2023-08-29 PROCEDURE — RECHECK

## 2023-08-29 NOTE — PROGRESS NOTES
Weight Management Nutrition Class     Diagnosis: Morbid Obesity    Bariatric Surgeon: Dr. Brandee Ferrara    Surgery: Vertical Sleeve Gastrectomy    Class: 5 week post op     Topics discussed today include:     fluid goals post op, protein goals post op, constipation, chew food well, exercise, avoidance of alcohol, PPI use, diet progression, hypoglycemia, dumping syndrome, protein supplems, vitamin/mineral supplements, calcium supplements and iron supplements    Patient was able to verbalize basic diet (protein, fluid, vitamin and mineral) recommendations and possible nutrition-related complications.  Yes

## 2023-09-05 ENCOUNTER — APPOINTMENT (OUTPATIENT)
Dept: LAB | Facility: CLINIC | Age: 38
End: 2023-09-05
Payer: MEDICARE

## 2023-09-05 DIAGNOSIS — K85.90 ACUTE PANCREATITIS, UNSPECIFIED COMPLICATION STATUS, UNSPECIFIED PANCREATITIS TYPE: ICD-10-CM

## 2023-09-05 LAB
ALBUMIN SERPL BCP-MCNC: 3.7 G/DL (ref 3.5–5)
ALP SERPL-CCNC: 44 U/L (ref 34–104)
ALT SERPL W P-5'-P-CCNC: 31 U/L (ref 7–52)
AST SERPL W P-5'-P-CCNC: 22 U/L (ref 13–39)
BILIRUB DIRECT SERPL-MCNC: 0.19 MG/DL (ref 0–0.2)
BILIRUB SERPL-MCNC: 0.59 MG/DL (ref 0.2–1)
PROT SERPL-MCNC: 6.7 G/DL (ref 6.4–8.4)

## 2023-09-05 PROCEDURE — 36415 COLL VENOUS BLD VENIPUNCTURE: CPT

## 2023-09-05 PROCEDURE — 80076 HEPATIC FUNCTION PANEL: CPT

## 2023-09-08 ENCOUNTER — TELEPHONE (OUTPATIENT)
Dept: HEMATOLOGY ONCOLOGY | Facility: CLINIC | Age: 38
End: 2023-09-08

## 2023-09-08 NOTE — TELEPHONE ENCOUNTER
Call Transfer   Who are you speaking with? Patient   If it is not the patient, are they listed on an active communication consent form? N/A   Who is the patients HemOnc/SurgOnc provider? What is the reason for this call? Patient calling in wanting to change the location of her MRI   Person/Department that the call was transferred to? Time that call was transferred? Central Scheduling @ 3:59AM   Your call will be transferred now. If you receive a voicemail, please leave a detailed message and a member of the team will return your call as soon as possible. Did you relay this information to the caller?   Yes

## 2023-09-28 ENCOUNTER — OFFICE VISIT (OUTPATIENT)
Dept: FAMILY MEDICINE CLINIC | Facility: CLINIC | Age: 38
End: 2023-09-28

## 2023-09-28 ENCOUNTER — HOSPITAL ENCOUNTER (OUTPATIENT)
Dept: RADIOLOGY | Facility: HOSPITAL | Age: 38
Discharge: HOME | End: 2023-09-28
Payer: MEDICARE

## 2023-09-28 DIAGNOSIS — E11.9 TYPE 2 DIABETES MELLITUS WITHOUT COMPLICATION, WITHOUT LONG-TERM CURRENT USE OF INSULIN (HCC): ICD-10-CM

## 2023-09-28 DIAGNOSIS — L70.0 ACNE VULGARIS: ICD-10-CM

## 2023-09-28 DIAGNOSIS — E66.01 CLASS 3 SEVERE OBESITY DUE TO EXCESS CALORIES WITH SERIOUS COMORBIDITY AND BODY MASS INDEX (BMI) OF 45.0 TO 49.9 IN ADULT (HCC): ICD-10-CM

## 2023-09-28 DIAGNOSIS — Z00.00 ANNUAL PHYSICAL EXAM: Primary | ICD-10-CM

## 2023-09-28 DIAGNOSIS — K85.90 ACUTE PANCREATITIS, UNSPECIFIED COMPLICATION STATUS, UNSPECIFIED PANCREATITIS TYPE: ICD-10-CM

## 2023-09-28 DIAGNOSIS — N92.0 MENORRHAGIA WITH REGULAR CYCLE: ICD-10-CM

## 2023-09-28 DIAGNOSIS — I10 PRIMARY HYPERTENSION: ICD-10-CM

## 2023-09-28 DIAGNOSIS — K85.90 ACUTE PANCREATITIS WITHOUT INFECTION OR NECROSIS, UNSPECIFIED PANCREATITIS TYPE: ICD-10-CM

## 2023-09-28 LAB — SL AMB POCT HEMOGLOBIN AIC: 5.9 (ref ?–6.5)

## 2023-09-28 PROCEDURE — A9585 GADOBUTROL INJECTION: HCPCS | Performed by: PHYSICIAN ASSISTANT

## 2023-09-28 PROCEDURE — 83036 HEMOGLOBIN GLYCOSYLATED A1C: CPT

## 2023-09-28 PROCEDURE — 99395 PREV VISIT EST AGE 18-39: CPT

## 2023-09-28 PROCEDURE — G1004 CDSM NDSC: HCPCS

## 2023-09-28 PROCEDURE — 74183 MRI ABD W/O CNTR FLWD CNTR: CPT

## 2023-09-28 RX ORDER — GADOBUTROL 604.72 MG/ML
10 INJECTION INTRAVENOUS
Status: COMPLETED | OUTPATIENT
Start: 2023-09-28 | End: 2023-09-28

## 2023-09-28 RX ORDER — CLINDAMYCIN PHOSPHATE 10 MG/G
GEL TOPICAL 2 TIMES DAILY
Qty: 60 G | Refills: 1 | Status: SHIPPED | OUTPATIENT
Start: 2023-09-28

## 2023-09-28 RX ADMIN — GADOBUTROL 10 ML: 604.72 INJECTION INTRAVENOUS at 21:43

## 2023-09-28 NOTE — PATIENT INSTRUCTIONS
Wellness Visit for Adults   WHAT YOU NEED TO KNOW:   What is a wellness visit? A wellness visit is when you see your healthcare provider to get screened for health problems. Your healthcare provider will also give you advice on how to stay healthy. Write down your questions so you remember to ask them. Ask your healthcare provider how often you should have a wellness visit. What happens at a wellness visit? Your healthcare provider will ask about your health, and your family history of health problems. This includes high blood pressure, heart disease, and cancer. He or she will ask if you have symptoms that concern you, if you smoke, and about your mood. You may also be asked about your intake of medicines, supplements, food, and alcohol. Any of the following may be done: Your weight  will be checked. Your height may also be checked so your body mass index (BMI) can be calculated. Your BMI shows if you are at a healthy weight. Your blood pressure  and heart rate will be checked. Your temperature may also be checked. Blood and urine tests  may be done. Blood tests may be done to check your cholesterol levels. Abnormal cholesterol levels increase your risk for heart disease and stroke. You may also need a blood or urine test to check for diabetes if you are at increased risk. Urine tests may be done to look for signs of an infection or kidney disease. A physical exam  includes checking your heartbeat and lungs with a stethoscope. Your healthcare provider may also check your skin to look for sun damage. Screening tests  may be recommended. A screening test is done to check for diseases that may not cause symptoms. The screening tests you may need depend on your age, gender, family history, and lifestyle habits. For example, colorectal screening may be recommended if you are 48years old or older. What screening tests do I need if I am a woman? A Pap smear  is used to screen for cervical cancer.  Pap smears are usually done every 3 to 5 years depending on your age. You may need them more often if you have had abnormal Pap smear test results in the past. Ask your healthcare provider how often you should have a Pap smear. A mammogram  is an x-ray of your breasts to screen for breast cancer. Experts recommend mammograms every 2 years starting at age 48 years. You may need a mammogram at age 52 years or younger if you have an increased risk for breast cancer. Talk to your healthcare provider about when you should start having mammograms and how often you need them. What vaccines might I need? Get an influenza vaccine  every year. The influenza vaccine protects you from the flu. Several types of viruses cause the flu. The viruses change over time, so new vaccines are made each year. Get a tetanus-diphtheria (Td) booster vaccine  every 10 years. This vaccine protects you against tetanus and diphtheria. Tetanus is a severe infection that may cause painful muscle spasms and lockjaw. Diphtheria is a severe bacterial infection that causes a thick covering in the back of your mouth and throat. Get a human papillomavirus (HPV) vaccine  if you are female and aged 23 to 32 or male 23 to 24 and never received it. This vaccine protects you from HPV infection. HPV is the most common infection spread by sexual contact. HPV may also cause vaginal, penile, and anal cancers. Get a pneumococcal vaccine  if you are aged 72 years or older. The pneumococcal vaccine is an injection given to protect you from pneumococcal disease. Pneumococcal disease is an infection caused by pneumococcal bacteria. The infection may cause pneumonia, meningitis, or an ear infection. Get a shingles vaccine  if you are 60 or older, even if you have had shingles before. The shingles vaccine is an injection to protect you from the varicella-zoster virus. This is the same virus that causes chickenpox.  Shingles is a painful rash that develops in people who had chickenpox or have been exposed to the virus. How can I eat healthy? My Plate is a model for planning healthy meals. It shows the types and amounts of foods that should go on your plate. Fruits and vegetables make up about half of your plate, and grains and protein make up the other half. A serving of dairy is included on the side of your plate. The amount of calories and serving sizes you need depends on your age, gender, weight, and height. Examples of healthy foods are listed below:  Eat a variety of vegetables  such as dark green, red, and orange vegetables. You can also include canned vegetables low in sodium (salt) and frozen vegetables without added butter or sauces. Eat a variety of fresh fruits , canned fruit in 100% juice, frozen fruit, and dried fruit. Include whole grains. At least half of the grains you eat should be whole grains. Examples include whole-wheat bread, wheat pasta, brown rice, and whole-grain cereals such as oatmeal.    Eat a variety of protein foods such as seafood (fish and shellfish), lean meat, and poultry without skin (turkey and chicken). Examples of lean meats include pork leg, shoulder, or tenderloin, and beef round, sirloin, tenderloin, and extra lean ground beef. Other protein foods include eggs and egg substitutes, beans, peas, soy products, nuts, and seeds. Choose low-fat dairy products such as skim or 1% milk or low-fat yogurt, cheese, and cottage cheese. Limit unhealthy fats  such as butter, hard margarine, and shortening. How much exercise do I need? Exercise at least 30 minutes per day on most days of the week. Some examples of exercise include walking, biking, dancing, and swimming. You can also fit in more physical activity by taking the stairs instead of the elevator or parking farther away from stores. Include muscle strengthening activities 2 days each week. Regular exercise provides many health benefits.  It helps you manage your weight, and decreases your risk for type 2 diabetes, heart disease, stroke, and high blood pressure. Exercise can also help improve your mood. Ask your healthcare provider about the best exercise plan for you. What are some general health and safety guidelines I should follow? Do not smoke. Nicotine and other chemicals in cigarettes and cigars can cause lung damage. Ask your healthcare provider for information if you currently smoke and need help to quit. E-cigarettes or smokeless tobacco still contain nicotine. Talk to your healthcare provider before you use these products. Limit alcohol. A drink of alcohol is 12 ounces of beer, 5 ounces of wine, or 1½ ounces of liquor. Lose weight, if needed. Being overweight increases your risk of certain health conditions. These include heart disease, high blood pressure, type 2 diabetes, and certain types of cancer. Protect your skin. Do not sunbathe or use tanning beds. Use sunscreen with a SPF 15 or higher. Apply sunscreen at least 15 minutes before you go outside. Reapply sunscreen every 2 hours. Wear protective clothing, hats, and sunglasses when you are outside. Drive safely. Always wear your seatbelt. Make sure everyone in your car wears a seatbelt. A seatbelt can save your life if you are in an accident. Do not use your cell phone when you are driving. This could distract you and cause an accident. Pull over if you need to make a call or send a text message. Practice safe sex. Use latex condoms if are sexually active and have more than one partner. Your healthcare provider may recommend screening tests for sexually transmitted infections (STIs). Wear helmets, lifejackets, and protective gear. Always wear a helmet when you ride a bike or motorcycle, go skiing, or play sports that could cause a head injury. Wear protective equipment when you play sports. Wear a lifejacket when you are on a boat or doing water sports.     CARE AGREEMENT: You have the right to help plan your care. Learn about your health condition and how it may be treated. Discuss treatment options with your healthcare providers to decide what care you want to receive. You always have the right to refuse treatment. The above information is an  only. It is not intended as medical advice for individual conditions or treatments. Talk to your doctor, nurse or pharmacist before following any medical regimen to see if it is safe and effective for you. © Copyright [employer] 2023 Information is for End User's use only and may not be sold, redistributed or otherwise used for commercial purposes.

## 2023-09-28 NOTE — PROGRESS NOTES
[address] [facility] [place]    NAME: Teja Starks  AGE: 43 y.o. SEX: female  : 1985     DATE:  2023     Assessment and Plan:     Problem List Items Addressed This Visit        Digestive    Acute pancreatitis     Continues to have mild epigastric tenderness and gas. Pt treating with Jam[employer] Juan tea with vinegar. MRI scheduled for later today. - Complete MRI as scheduled and follow up with GI. Endocrine    Type 2 diabetes mellitus without complication, without long-term current use of insulin ([facility])       Lab Results   Component Value Date    HGBA1C 5.9 2023    HGBA1C 6.6 (H) 2023    HGBA1C 7.9 (H) 2022     HgbA1c down from high of 9.1% last year, currently diet controlled. - Continue healthy diet and daily physical activity. Relevant Orders    POCT hemoglobin A1c (Completed)       Cardiovascular and Mediastinum    Hypertension     BP at goal in office today on repeat: 128/84. No longer on BP medication since surgery. - Continue low-salt diet and daily physical activity. Musculoskeletal and Integument    Acne vulgaris    Relevant Medications    clindamycin (CLINDAGEL) 1 % gel       Other    Class 3 severe obesity due to excess calories with serious comorbidity and body mass index (BMI) of 45.0 to 49.9 in adult ([facility])     Body mass index is 45.11 kg/m². The BMI is above normal. Down 30+ lbs since surgery.  - Nutrition recommendations include decreasing portion sizes, encouraging healthy choices of fruits and vegetables, limiting drinks that contain sugar and moderation in carbohydrate intake. - Pt exercising little, encouraged moderate physical activity 150 minutes/week to promote continued weight loss. - Increase water intake. - Continue plan as per Weight Management.          Menorrhagia with regular cycle     Pt reports very heavy periods lasting 6-7 days since bariatric surgery. Also with hx of fibroids.  - Recommend scheduling with GYN. Other Visit Diagnoses     Annual physical exam    -  Primary          Immunizations and preventive care screenings were discussed with patient today. Appropriate education was printed on patient's after visit summary. Counseling:  Dental Health: discussed importance of regular tooth brushing, flossing, and dental visits. · Exercise: the importance of regular exercise/physical activity was discussed. Recommend exercise 3-5 times per week for at least 30 minutes. BMI Counseling: Body mass index is 45.11 kg/m². The BMI is above normal. Nutrition recommendations include decreasing portion sizes, encouraging healthy choices of fruits and vegetables, limiting drinks that contain sugar and moderation in carbohydrate intake. Exercise recommendations include moderate physical activity 150 minutes/week. Rationale for BMI follow-up plan is due to patient being overweight or obese. Return in about 3 months (around 12/28/2023) for DM/HTN f/u. Chief Complaint:     Chief Complaint   Patient presents with   • Physical Exam      History of Present Illness:     Adult Annual Physical   Usha Diggs presents to the office for a comprehensive physical exam. The patient reports ongoing gas pain since bariatric surgery. MRI is scheduled for later today to evaluate post acute pancreatitis. Pt is self-treating with [employer] Juan tea with vinegar. Pt not taking bariatric multivitamin consistently due to taste. Diet and Physical Activity  · Diet/Nutrition: veggies, fish, soft foods. 1 bottle water per day   · Exercise: walking some days. General Health  · Sleep: sleeps well, pt believes she is no longer snoring. · Hearing: normal - bilateral.  · Vision: vision problems: failed eye exam for  test using corrective lenses. Pt will follow up with Ophthalmology. · Dental: regular dental visits.        /GYN Health  · Last menstrual period: 9/18/23  · Contraceptive method: none. Review of Systems:     Review of Systems   Constitutional: Negative for activity change, fatigue, fever and unexpected weight change (intentional). Eyes: Positive for visual disturbance. Negative for pain, discharge, redness and itching. Respiratory: Negative for cough, chest tightness, shortness of breath and wheezing. Cardiovascular: Negative for chest pain, palpitations and leg swelling. Gastrointestinal: Positive for abdominal distention (bloating, belching) and abdominal pain. Negative for constipation, diarrhea, nausea and vomiting. Genitourinary: Positive for menstrual problem (heavy periods). Negative for difficulty urinating and dysuria. Musculoskeletal: Positive for back pain. Neurological: Negative for dizziness, light-headedness and headaches. Psychiatric/Behavioral: Negative for dysphoric mood and sleep disturbance. The patient is not nervous/anxious. All other systems reviewed and are negative.      Past Medical History:     Past Medical History:   Diagnosis Date   • Diabetes mellitus ([address])    • Hypertension    • Obesity    • Periodontal disease    • Risk factors for obstructive sleep apnea 08/08/2022   • Seasonal allergies       Past Surgical History:     Past Surgical History:   Procedure Laterality Date   • CATARACT EXTRACTION Left 06/08/2022   • CATARACT EXTRACTION Right 11/10/2021   • COLONOSCOPY  2013   • [place] [facility] N/A 7/24/2023    Procedure: GASTRECTOMY SLEEVE W/ ROBOT & INTRAOPERATIVE EGD;  Surgeon: Bryant Martinez MD;  Location: [facility];  Service: Bariatrics      Social History:     Social History     Socioeconomic History   • Marital status: Single     Spouse name: None   • Number of children: None   • Years of education: None   • Highest education level: None   Occupational History   • None   Tobacco Use   • Smoking status: Never     Passive exposure: Never   • Smokeless tobacco: Never   Vaping Use   • Vaping Use: Never used   Substance and Sexual Activity   • Alcohol use: Never   • Drug use: Never   • Sexual activity: Not Currently     Partners: Female     Birth control/protection: Abstinence, None   Other Topics Concern   • None   Social History Narrative   • None     Social Determinants of Health     Financial Resource Strain: Medium Risk (6/22/2023)    Overall Financial Resource Strain (CARDIA)    • Difficulty of Paying Living Expenses: Somewhat hard   Food Insecurity: No Food Insecurity (8/14/2023)    Hunger Vital Sign    • Worried About Running Out of Food in the Last Year: Never true    • Ran Out of Food in the Last Year: Never true   Recent Concern: Food Insecurity - Food Insecurity Present (6/22/2023)    Hunger Vital Sign    • Worried About Running Out of Food in the Last Year: Sometimes true    • Ran Out of Food in the Last Year: Sometimes true   Transportation Needs: No Transportation Needs (8/14/2023)    PRAPARE - Transportation    • Lack of Transportation (Medical): No    • Lack of Transportation (Non-Medical): No   Recent Concern: Transportation Needs - Unmet Transportation Needs (6/22/2023)    PRAPARE - Transportation    • Lack of Transportation (Medical):  Yes    • Lack of Transportation (Non-Medical): Yes   Physical Activity: Not on file   Stress: Not on file   Social Connections: Not on file   Intimate Partner Violence: Not on file   Housing Stability: Low Risk  (8/14/2023)    Housing Stability Vital Sign    • Unable to Pay for Housing in the Last Year: No    • Number of Places Lived in the Last Year: 1    • Unstable Housing in the Last Year: No      Family History:     Family History   Problem Relation Age of Onset   • Obesity Mother    • Sleep apnea Neg Hx       Current Medications:     Current Outpatient Medications   Medication Sig Dispense Refill   • clindamycin (CLINDAGEL) 1 % gel Apply topically 2 (two) times a day 60 g 1   • Blood Glucose Monitoring Suppl (OneTouch Verio Flex System) w/Device KIT TEST EVERY MORNING (Patient not taking: Reported on 8/24/2023) 1 kit 0   • Blood Pressure Monitoring (Adult Blood Pressure Cuff Lg) KIT Use in the morning (Patient not taking: Reported on 4/6/2023) 1 kit 0   • Multiple Vitamins-Minerals (multivitamin with minerals) tablet Take 1 tablet by mouth daily     • omeprazole (PriLOSEC) 20 mg delayed release capsule Take 1 capsule (20 mg total) by mouth daily Do not start before July 25, 2023. (Patient not taking: Reported on 8/24/2023) 30 capsule 3   • OneTouch Delica Lancets 40H MISC Check blood sugars twice daily. Please substitute with appropriate alternative as covered by patient's insurance. Dx: E11.65 (Patient not taking: Reported on 8/3/2023) 200 each 3   • polyethylene glycol (MIRALAX) 17 g packet Take 17 g by mouth daily 30 each 0   • simethicone (MYLICON,GAS-X) 162 MG capsule Take 1 capsule (180 mg total) by mouth every 6 (six) hours as needed for flatulence 30 capsule 0     No current facility-administered medications for this visit. Allergies:     No Known Allergies   Physical Exam:     /84   Pulse 96   Temp 98.4 °F (36.9 °C) (Temporal)   Resp 16   Ht 5' (1.524 m)   Wt 105 kg (231 lb)   LMP 09/18/2023 (Approximate)   SpO2 99%   Breastfeeding No   BMI 45.11 kg/m²     Physical Exam  Vitals reviewed. Constitutional:       General: She is not in acute distress. Appearance: She is morbidly obese. She is not ill-appearing or diaphoretic. HENT:      Head: Normocephalic and atraumatic. Right Ear: Tympanic membrane, ear canal and external ear normal.      Left Ear: Tympanic membrane, ear canal and external ear normal.      Nose: Nose normal.      Mouth/Throat:      Mouth: Mucous membranes are moist.      Pharynx: Oropharynx is clear. Eyes:      General: Lids are normal.      Conjunctiva/sclera: Conjunctivae normal.      Pupils: Pupils are equal, round, and reactive to light.    Neck: Thyroid: No thyromegaly or thyroid tenderness. Cardiovascular:      Rate and Rhythm: Normal rate and regular rhythm. Pulses: Normal pulses. Heart sounds: Normal heart sounds. No murmur heard. Pulmonary:      Effort: Pulmonary effort is normal. No tachypnea. Breath sounds: Normal breath sounds. No decreased breath sounds or wheezing. Abdominal:      General: Bowel sounds are normal. There is no distension. Palpations: Abdomen is soft. Tenderness: There is abdominal tenderness in the right upper quadrant, epigastric area and left upper quadrant. There is no guarding or rebound. Negative signs include Marcial's sign and McBurney's sign. Musculoskeletal:      Cervical back: Neck supple. Right lower leg: No edema. Left lower leg: No edema. Lymphadenopathy:      Cervical: No cervical adenopathy. Skin:     General: Skin is warm and dry. Capillary Refill: Capillary refill takes less than 2 seconds. Neurological:      Mental Status: She is alert and oriented to person, place, and time. Cranial Nerves: No cranial nerve deficit. Motor: Motor function is intact. No weakness. Gait: Gait is intact. Psychiatric:         Attention and Perception: Attention normal.         Mood and Affect: Mood and affect normal.         Speech: Speech normal.         Behavior: Behavior normal.         Thought Content:  Thought content normal.          Jorge Stevens, [address] [facility]

## 2023-10-03 VITALS
WEIGHT: 231 LBS | HEIGHT: 60 IN | TEMPERATURE: 98.4 F | RESPIRATION RATE: 16 BRPM | HEART RATE: 96 BPM | BODY MASS INDEX: 45.35 KG/M2 | OXYGEN SATURATION: 99 % | SYSTOLIC BLOOD PRESSURE: 128 MMHG | DIASTOLIC BLOOD PRESSURE: 84 MMHG

## 2023-10-03 PROBLEM — L70.0 ACNE VULGARIS: Status: ACTIVE | Noted: 2023-10-03

## 2023-10-03 PROBLEM — N92.0 MENORRHAGIA WITH REGULAR CYCLE: Status: ACTIVE | Noted: 2023-10-03

## 2023-10-03 NOTE — ASSESSMENT & PLAN NOTE
BP at goal in office today on repeat: 128/84. No longer on BP medication since surgery. - Continue low-salt diet and daily physical activity.

## 2023-10-03 NOTE — ASSESSMENT & PLAN NOTE
Lab Results   Component Value Date    HGBA1C 5.9 09/28/2023    HGBA1C 6.6 (H) 05/12/2023    HGBA1C 7.9 (H) 12/19/2022     HgbA1c down from high of 9.1% last year, currently diet controlled. - Continue healthy diet and daily physical activity.

## 2023-10-03 NOTE — ASSESSMENT & PLAN NOTE
Pt reports very heavy periods lasting 6-7 days since bariatric surgery. Also with hx of fibroids.  - Recommend scheduling with GYN.

## 2023-10-03 NOTE — ASSESSMENT & PLAN NOTE
Body mass index is 45.11 kg/m². The BMI is above normal. Down 30+ lbs since surgery.  - Nutrition recommendations include decreasing portion sizes, encouraging healthy choices of fruits and vegetables, limiting drinks that contain sugar and moderation in carbohydrate intake. - Pt exercising little, encouraged moderate physical activity 150 minutes/week to promote continued weight loss. - Increase water intake. - Continue plan as per Weight Management.

## 2023-10-03 NOTE — ASSESSMENT & PLAN NOTE
Continues to have mild epigastric tenderness and gas. Pt treating with Kendy Hemphill Juan tea with vinegar. MRI scheduled for later today.    - Complete MRI as scheduled and follow up with GI.

## 2023-10-19 DIAGNOSIS — K91.2 POSTSURGICAL MALABSORPTION: ICD-10-CM

## 2023-10-19 DIAGNOSIS — Z87.19 HISTORY OF ACUTE PANCREATITIS: ICD-10-CM

## 2023-10-19 DIAGNOSIS — Z98.84 BARIATRIC SURGERY STATUS: Primary | ICD-10-CM

## 2023-10-27 ENCOUNTER — APPOINTMENT (OUTPATIENT)
Dept: LAB | Facility: CLINIC | Age: 38
End: 2023-10-27

## 2023-10-27 DIAGNOSIS — K91.2 POSTSURGICAL MALABSORPTION: ICD-10-CM

## 2023-10-27 DIAGNOSIS — Z98.84 BARIATRIC SURGERY STATUS: ICD-10-CM

## 2023-10-27 DIAGNOSIS — Z87.19 HISTORY OF ACUTE PANCREATITIS: ICD-10-CM

## 2023-10-27 LAB
25(OH)D3 SERPL-MCNC: 45.3 NG/ML (ref 30–100)
ALBUMIN SERPL BCP-MCNC: 3.9 G/DL (ref 3.5–5)
ALP SERPL-CCNC: 45 U/L (ref 34–104)
ALT SERPL W P-5'-P-CCNC: 15 U/L (ref 7–52)
ANION GAP SERPL CALCULATED.3IONS-SCNC: 9 MMOL/L
AST SERPL W P-5'-P-CCNC: 20 U/L (ref 13–39)
BILIRUB SERPL-MCNC: 0.39 MG/DL (ref 0.2–1)
BUN SERPL-MCNC: 6 MG/DL (ref 5–25)
CALCIUM SERPL-MCNC: 9.1 MG/DL (ref 8.4–10.2)
CHLORIDE SERPL-SCNC: 108 MMOL/L (ref 96–108)
CO2 SERPL-SCNC: 24 MMOL/L (ref 21–32)
CREAT SERPL-MCNC: 1 MG/DL (ref 0.6–1.3)
ERYTHROCYTE [DISTWIDTH] IN BLOOD BY AUTOMATED COUNT: 12.9 % (ref 11.6–15.1)
FERRITIN SERPL-MCNC: 29 NG/ML (ref 11–307)
FOLATE SERPL-MCNC: 7.1 NG/ML
GFR SERPL CREATININE-BSD FRML MDRD: 71 ML/MIN/1.73SQ M
GLUCOSE P FAST SERPL-MCNC: 124 MG/DL (ref 65–99)
HCT VFR BLD AUTO: 40.1 % (ref 34.8–46.1)
HGB BLD-MCNC: 13.6 G/DL (ref 11.5–15.4)
IRON SATN MFR SERPL: 19 % (ref 15–50)
IRON SERPL-MCNC: 68 UG/DL (ref 50–212)
LIPASE SERPL-CCNC: 50 U/L (ref 11–82)
MCH RBC QN AUTO: 32.2 PG (ref 26.8–34.3)
MCHC RBC AUTO-ENTMCNC: 33.9 G/DL (ref 31.4–37.4)
MCV RBC AUTO: 95 FL (ref 82–98)
PLATELET # BLD AUTO: 239 THOUSANDS/UL (ref 149–390)
PMV BLD AUTO: 12.4 FL (ref 8.9–12.7)
POTASSIUM SERPL-SCNC: 3.5 MMOL/L (ref 3.5–5.3)
PROT SERPL-MCNC: 7 G/DL (ref 6.4–8.4)
PTH-INTACT SERPL-MCNC: 57.6 PG/ML (ref 12–88)
RBC # BLD AUTO: 4.22 MILLION/UL (ref 3.81–5.12)
SODIUM SERPL-SCNC: 141 MMOL/L (ref 135–147)
TIBC SERPL-MCNC: 349 UG/DL (ref 250–450)
UIBC SERPL-MCNC: 281 UG/DL (ref 155–355)
VIT B12 SERPL-MCNC: 413 PG/ML (ref 180–914)
WBC # BLD AUTO: 3.61 THOUSAND/UL (ref 4.31–10.16)

## 2023-10-27 PROCEDURE — 84425 ASSAY OF VITAMIN B-1: CPT

## 2023-10-27 PROCEDURE — 83550 IRON BINDING TEST: CPT

## 2023-10-27 PROCEDURE — 82728 ASSAY OF FERRITIN: CPT

## 2023-10-27 PROCEDURE — 83690 ASSAY OF LIPASE: CPT

## 2023-10-27 PROCEDURE — 36415 COLL VENOUS BLD VENIPUNCTURE: CPT

## 2023-10-27 PROCEDURE — 83970 ASSAY OF PARATHORMONE: CPT

## 2023-10-27 PROCEDURE — 80053 COMPREHEN METABOLIC PANEL: CPT

## 2023-10-27 PROCEDURE — 83540 ASSAY OF IRON: CPT

## 2023-10-27 PROCEDURE — 84590 ASSAY OF VITAMIN A: CPT

## 2023-10-27 PROCEDURE — 84630 ASSAY OF ZINC: CPT

## 2023-10-27 PROCEDURE — 85027 COMPLETE CBC AUTOMATED: CPT

## 2023-10-27 PROCEDURE — 82306 VITAMIN D 25 HYDROXY: CPT

## 2023-10-27 PROCEDURE — 82746 ASSAY OF FOLIC ACID SERUM: CPT

## 2023-10-27 PROCEDURE — 82607 VITAMIN B-12: CPT

## 2023-11-01 LAB
VIT A SERPL-MCNC: 27.4 UG/DL (ref 18.9–57.3)
VIT B1 BLD-SCNC: 87.3 NMOL/L (ref 66.5–200)

## 2023-11-02 ENCOUNTER — OFFICE VISIT (OUTPATIENT)
Dept: BARIATRICS | Facility: CLINIC | Age: 38
End: 2023-11-02
Payer: MEDICARE

## 2023-11-02 VITALS
HEIGHT: 60 IN | BODY MASS INDEX: 43.88 KG/M2 | DIASTOLIC BLOOD PRESSURE: 80 MMHG | SYSTOLIC BLOOD PRESSURE: 124 MMHG | HEART RATE: 78 BPM | WEIGHT: 223.5 LBS

## 2023-11-02 DIAGNOSIS — Z87.19 HISTORY OF ACUTE PANCREATITIS: ICD-10-CM

## 2023-11-02 DIAGNOSIS — K91.2 POSTSURGICAL MALABSORPTION: ICD-10-CM

## 2023-11-02 DIAGNOSIS — E66.01 OBESITY, CLASS III, BMI 40-49.9 (MORBID OBESITY) (HCC): ICD-10-CM

## 2023-11-02 DIAGNOSIS — Z48.815 ENCOUNTER FOR SURGICAL AFTERCARE FOLLOWING SURGERY OF DIGESTIVE SYSTEM: Primary | ICD-10-CM

## 2023-11-02 DIAGNOSIS — Z98.84 BARIATRIC SURGERY STATUS: ICD-10-CM

## 2023-11-02 PROCEDURE — 99213 OFFICE O/P EST LOW 20 MIN: CPT | Performed by: PHYSICIAN ASSISTANT

## 2023-11-02 NOTE — PATIENT INSTRUCTIONS
Follow-up in 3 months. We kindly ask that your arrive 15 minutes before your scheduled appointment time with your provider to allow our staff to room you, get your vital signs and update your chart. Call our office if you have any problems with abdominal pain especially associated with fever, chills, nausea, vomiting or any other concerns. All  Post-bariatric surgery patients should be aware that very small quantities of any alcohol can cause impairment and it is very possible not to feel the effect. The effect can be in the system for several hours. It is also a stomach irritant. It is advised to AVOID alcohol, Nonsteroidal antiinflammatory drugs (NSAIDS) and nicotine of all forms . Any of these can cause stomach irritation/pain. Discussed the effects of alcohol on a bariatric patient and the increased impairment risk. Keep up the good work!

## 2023-11-02 NOTE — PROGRESS NOTES
Assessment/Plan:     Patient ID: Thiago Lazo is a 43 y.o. female. Bariatric Surgery Status    -s/p Vertical Sleeve Gastrectomy with Dr. Becky Olivas on 7/24/2023. Presents to the office today for 2nd postop doing well from bariatric standpoint. She states she is no longer taking PPI but is not having any issues related to this. She would like to make appt with RD to review diet as she advances and moves away from surgery    About 1 month postop patient was admitted to the hospital for acute pancreatitis. The patient was evaluated by gastroenterology but the cause of her pancreatitis was not definitively established. She did see GI after, underwent MRI but did not follow up with them and needs to reschedule her appt as she may need further testing. Since this time however states she has been feeling well without complaints or pain at this time. Stressed importance of making follow up appt with GI and she states she will call them to schedule. Continued/Maintain healthy weight loss with good nutrition intakes. Adequate hydration with at least 64oz. fluid intake. Follow diet as discussed. Follow vitamin and mineral recommendations as reviewed with you. Exercise as tolerated. Colonoscopy referral made: n/a    Follow-up in 3 months. We kindly ask that your arrive 15 minutes before your scheduled appointment time with your provider to allow our staff to room you, get your vital signs and update your chart. Call our office if you have any problems with abdominal pain especially associated with fever, chills, nausea, vomiting or any other concerns. All  Post-bariatric surgery patients should be aware that very small quantities of any alcohol can cause impairment and it is very possible not to feel the effect. The effect can be in the system for several hours. It is also a stomach irritant.      It is advised to AVOID alcohol, Nonsteroidal antiinflammatory drugs (NSAIDS) and nicotine of all forms . Any of these can cause stomach irritation/pain. Discussed the effects of alcohol on a bariatric patient and the increased impairment risk. Keep up the good work! Postsurgical Malabsorption   -At risk for malabsorption of vitamins/minerals secondary to malabsorption and restriction of intake from bariatric surgery  -Currently taking adequate postop bariatric surgery vitamin supplementation  -Last set of bariatric labs completed 10/2023 - one lab still pending. So far all  vitamins wnl but fasting sugars remain a bit high   -Patient received education about the importance of adhering to a lifelong supplementation regimen to avoid vitamin/mineral deficiencies      Diagnoses and all orders for this visit:    Encounter for surgical aftercare following surgery of digestive system    Bariatric surgery status    Postsurgical malabsorption    Obesity, Class III, BMI 40-49.9 (morbid obesity) ([address])    History of acute pancreatitis         Subjective:      Patient ID: Dali Boo is a 43 y.o. female. -s/p Vertical Sleeve Gastrectomy with Dr. Yaima Damon on 7/24/2023. Presents to the office today for 2nd postop doing well from bariatric standpointTolerating diet without issues; denies N/V, dysphagia, reflux. Initial:  Current: 223.5  EWL: (Weight loss is ahead of schedule at this post surgical period.)  Aleks  Current BMI is Body mass index is 43.65 kg/m².     Tolerating a regular diet-yes  Eating at least 60 grams of protein per day-yes  Following 30/60 minute rule with liquids-yes  Drinking at least 64 ounces of fluid per day-needs to improve  Sufficient exercise-no  Using NSAIDs regularly-no  Using nicotine-no  Using alcohol-no  Supplements: Multivitamins     EWL is 34%    The following portions of the patient's history were reviewed and updated as appropriate: allergies, current medications, past family history, past medical history, past social history, past surgical history and problem list.    Review of Systems   Constitutional: Negative. Respiratory: Negative. Cardiovascular: Negative. Gastrointestinal: Negative. Negative for abdominal pain, blood in stool, constipation, diarrhea, nausea and vomiting. Neurological: Negative. Psychiatric/Behavioral: Negative. Objective:    /80 (BP Location: Left arm, Patient Position: Sitting, Cuff Size: Standard)   Pulse 78   Ht 5' (1.524 m)   Wt 101 kg (223 lb 8 oz)   BMI 43.65 kg/m²      Physical Exam  Vitals and nursing note reviewed. Constitutional:       Appearance: Normal appearance. She is obese. HENT:      Head: Normocephalic and atraumatic. Eyes:      Extraocular Movements: Extraocular movements intact. Pupils: Pupils are equal, round, and reactive to light. Cardiovascular:      Rate and Rhythm: Normal rate. Pulmonary:      Effort: Pulmonary effort is normal.   Musculoskeletal:         General: Normal range of motion. Cervical back: Normal range of motion. Skin:     General: Skin is warm and dry. Neurological:      General: No focal deficit present. Mental Status: She is alert and oriented to person, place, and time.    Psychiatric:         Mood and Affect: Mood normal.

## 2023-11-08 ENCOUNTER — TELEPHONE (OUTPATIENT)
Dept: BARIATRICS | Facility: CLINIC | Age: 38
End: 2023-11-08

## 2023-11-08 LAB — ZINC SERPL-MCNC: 87 UG/DL (ref 44–115)

## 2023-11-08 NOTE — TELEPHONE ENCOUNTER
----- Message from Naga Sagastume sent at 11/8/2023  1:55 PM EST -----    ----- Message -----  From: Rosalinda Patiño PA-C  Sent: 11/8/2023   8:10 AM EST  To: #    Please call patient:    Our office received your most recent bariatric labs results    Your blood sugar if it was fasting was high  Continued weight loss ( if needed) can help. Rea Tamra Avoiding simple sugars/sweets and having more "complex carbohydrates" like vegetables, fruits and whole grains in  your diet can also help. This level should also be reviewed  with your primary care provider or endocrinologist at a  routine office visit since we do not manage your blood sugars. Your white blood cell count is low. White blood cell counts can TEMPORARILY go down during times of rapid weight loss but should become normal once weight loss stops. White blood cells help the body fight off infection. IF you are having frequent illness or infections, then follow-up for evaluation with your PCP now. If you are NOT having frequent illness or infection and weight loss has stopped, then review these levels with your PCP at a routine office visit.

## 2023-11-09 ENCOUNTER — OFFICE VISIT (OUTPATIENT)
Dept: BARIATRICS | Facility: CLINIC | Age: 38
End: 2023-11-09

## 2023-11-09 VITALS — WEIGHT: 224.2 LBS | BODY MASS INDEX: 44.02 KG/M2 | HEIGHT: 60 IN

## 2023-11-09 DIAGNOSIS — E66.01 OBESITY, CLASS III, BMI 40-49.9 (MORBID OBESITY) (HCC): Primary | ICD-10-CM

## 2023-11-09 PROCEDURE — RECHECK

## 2023-11-09 NOTE — PROGRESS NOTES
Jesus Torres had questions about her current diet and losing weight. She was still following a soft diet-eating mostly fish in soup. Reviewed diet, encouraged exercise, importance of 30/60 rule. Pt wants to follow up with RD and SW in 2 months.

## 2024-01-09 ENCOUNTER — OFFICE VISIT (OUTPATIENT)
Dept: FAMILY MEDICINE CLINIC | Facility: CLINIC | Age: 39
End: 2024-01-09

## 2024-01-09 VITALS
HEIGHT: 60 IN | DIASTOLIC BLOOD PRESSURE: 100 MMHG | RESPIRATION RATE: 16 BRPM | TEMPERATURE: 97 F | SYSTOLIC BLOOD PRESSURE: 142 MMHG | HEART RATE: 88 BPM | OXYGEN SATURATION: 99 % | BODY MASS INDEX: 42.01 KG/M2 | WEIGHT: 214 LBS

## 2024-01-09 DIAGNOSIS — E66.01 CLASS 3 SEVERE OBESITY DUE TO EXCESS CALORIES WITH SERIOUS COMORBIDITY AND BODY MASS INDEX (BMI) OF 40.0 TO 44.9 IN ADULT (HCC): ICD-10-CM

## 2024-01-09 DIAGNOSIS — N92.1 MENORRHAGIA WITH IRREGULAR CYCLE: ICD-10-CM

## 2024-01-09 DIAGNOSIS — R30.0 DYSURIA: ICD-10-CM

## 2024-01-09 DIAGNOSIS — I10 PRIMARY HYPERTENSION: ICD-10-CM

## 2024-01-09 DIAGNOSIS — E11.9 TYPE 2 DIABETES MELLITUS WITHOUT COMPLICATION, WITHOUT LONG-TERM CURRENT USE OF INSULIN (HCC): Primary | ICD-10-CM

## 2024-01-09 DIAGNOSIS — Z12.4 CERVICAL CANCER SCREENING: ICD-10-CM

## 2024-01-09 LAB — SL AMB POCT HEMOGLOBIN AIC: 5.7 (ref ?–6.5)

## 2024-01-09 PROCEDURE — 82570 ASSAY OF URINE CREATININE: CPT

## 2024-01-09 PROCEDURE — 82043 UR ALBUMIN QUANTITATIVE: CPT

## 2024-01-09 PROCEDURE — 83036 HEMOGLOBIN GLYCOSYLATED A1C: CPT

## 2024-01-09 PROCEDURE — 99214 OFFICE O/P EST MOD 30 MIN: CPT

## 2024-01-09 RX ORDER — LISINOPRIL 10 MG/1
10 TABLET ORAL DAILY
Qty: 90 TABLET | Refills: 1 | Status: SHIPPED | OUTPATIENT
Start: 2024-01-09

## 2024-01-09 NOTE — ASSESSMENT & PLAN NOTE
Lab Results   Component Value Date    HGBA1C 5.7 01/09/2024    HGBA1C 5.9 09/28/2023    HGBA1C 6.6 (H) 05/12/2023    HGBA1C 7.9 (H) 12/19/2022    HGBA1C 8.0 (A) 11/21/2022    HGBA1C 8.2 (H) 08/22/2022    HGBA1C 9.1 (H) 05/19/2022     Super improvement in A1c with weight loss. Now diet-controlled.   - Continue healthy diet and daily physical activity.  - Foot exam today WNL.  - Pt to schedule routine DM visit with her current ophthalmologist.

## 2024-01-09 NOTE — ASSESSMENT & PLAN NOTE
BP above goal in office today at 150/100, 142/100 on repeat. Pt taking lisinopril occasionally.   - Resume taking lisinopril 10 mg daily.  - Microalbumin/creatinine ratio.  - Encouraged low-salt diet and daily physical activity.   - Return for BP recheck in 4 weeks.

## 2024-01-09 NOTE — PROGRESS NOTES
Name: Leatha Marshall      : 1985      MRN: [id number]  Encounter Provider: DEVONTE Reyez  Encounter Date: 2024   Encounter department: [facility]    Assessment & Plan     1. Type 2 diabetes mellitus without complication, without long-term current use of insulin ([facility])  Assessment & Plan:    Lab Results   Component Value Date    HGBA1C 5.7 2024    HGBA1C 5.9 2023    HGBA1C 6.6 (H) 2023    HGBA1C 7.9 (H) 2022    HGBA1C 8.0 (A) 2022    HGBA1C 8.2 (H) 2022    HGBA1C 9.1 (H) 2022     Super improvement in A1c with weight loss. Now diet-controlled.   - Continue healthy diet and daily physical activity.  - Foot exam today WNL.  - Pt to schedule routine DM visit with her current ophthalmologist.     Orders:  -     POCT hemoglobin A1c  -     Diabetic foot exam  -     Albumin / creatinine urine ratio    2. Class 3 severe obesity due to excess calories with serious comorbidity and body mass index (BMI) of 40.0 to 44.9 in adult ([facility])  Assessment & Plan:  Body mass index is 41.79 kg/m². The BMI is above normal.   - Nutrition recommendations include encouraging healthy choices of fruits and vegetables, limiting drinks that contain sugar and moderation in carbohydrate intake.   - Exercise recommendations include moderate physical activity 150 minutes/week.   - Continue plan as per Weight Management.      3. Primary hypertension  Assessment & Plan:  BP above goal in office today at 150/100, 142/100 on repeat. Pt taking lisinopril occasionally.   - Resume taking lisinopril 10 mg daily.  - Microalbumin/creatinine ratio.  - Encouraged low-salt diet and daily physical activity.   - Return for BP recheck in 4 weeks.     Orders:  -     CBC and differential; Future  -     Comprehensive metabolic panel; Future  -     lisinopril (ZESTRIL) 10 mg tablet; Take 1 tablet (10 mg total) by mouth daily  -     Albumin / creatinine urine ratio    4.  "Dysuria  -     UA (URINE) with reflex to Scope  -     Urine culture    5. Menorrhagia with irregular cycle  Assessment & Plan:  Long, heavy, irregular periods have continued since bariatric surgery, using overnight pads required to change frequently. Reports feeling weak and has a \"heavy\" sensation with urination.  - CBC, iron panel, UA/culture.  - Referred to GYN.     Orders:  -     Iron Panel (Includes Ferritin, Iron Sat%, Iron, and TIBC); Future  -     Comprehensive metabolic panel; Future    6. Cervical cancer screening  -     Ambulatory Referral to Obstetrics / Gynecology; Future      BMI Counseling: Body mass index is 41.79 kg/m². The BMI is above normal. Nutrition recommendations include encouraging healthy choices of fruits and vegetables, limiting drinks that contain sugar and moderation in carbohydrate intake. Exercise recommendations include moderate physical activity 150 minutes/week. Rationale for BMI follow-up plan is due to patient being overweight or obese.   - Continue plan as per Weight Management.        Abram Zhang presents to the office today for routine follow up for chronic conditions. Chronic conditions are stable unless otherwise noted.   Pt reports continuing to have long, heavy periods that cause her to feel weak. She has not yet scheduled with GYN. She is also having a \"heavy\" sensation with urination. She has never been pregnant. Last sexual activity about 6 years ago.  BP medication was discontinued after bariatric surgery however pt continues to take an occasional dose when she has a headache or pain over her right eye. She does not have a BP cuff at home.   Pt states she received her seasonal flu vaccine at [facility].      Review of Systems   Constitutional:  Negative for fatigue, fever and unexpected weight change.   Eyes:  Negative for visual disturbance.   Respiratory:  Negative for cough, shortness of breath and wheezing.    Cardiovascular:  Negative for chest " "pain, palpitations and leg swelling.   Gastrointestinal:  Negative for abdominal pain, blood in stool, constipation, diarrhea, nausea and vomiting.   Genitourinary:  Positive for dysuria and menstrual problem.        \"Heavy\" sensation with urination.   Skin:  Negative for rash.   Neurological:  Positive for headaches (occasional). Negative for dizziness and light-headedness.   Psychiatric/Behavioral:  Negative for dysphoric mood and sleep disturbance. The patient is not nervous/anxious.    All other systems reviewed and are negative.      Past Medical History:   Diagnosis Date    Diabetes mellitus (HCC)     Hypertension     Obesity     Periodontal disease     Risk factors for obstructive sleep apnea 08/08/2022    Seasonal allergies      Past Surgical History:   Procedure Laterality Date    CATARACT EXTRACTION Left 06/08/2022    CATARACT EXTRACTION Right 11/10/2021    COLONOSCOPY  2013    IN LAPS GSTRC RSTRICTIV PX LONGITUDINAL GASTRECTOMY N/A 7/24/2023    Procedure: GASTRECTOMY SLEEVE W/ ROBOT & INTRAOPERATIVE EGD;  Surgeon: Susan Szymanski MD;  Location: [facility] OR;  Service: Bariatrics     Family History   Problem Relation Age of Onset    Obesity Mother     Sleep apnea Neg Hx      Social History     Socioeconomic History    Marital status: Single     Spouse name: None    Number of children: None    Years of education: None    Highest education level: None   Occupational History    None   Tobacco Use    Smoking status: Never     Passive exposure: Never    Smokeless tobacco: Never   Vaping Use    Vaping status: Never Used   Substance and Sexual Activity    Alcohol use: Never    Drug use: Never    Sexual activity: Not Currently     Partners: Female     Birth control/protection: Abstinence, None   Other Topics Concern    None   Social History Narrative    None     Social Determinants of Health     Financial Resource Strain: Medium Risk (6/22/2023)    Overall Financial Resource Strain (CARDIA)     Difficulty of Paying " Living Expenses: Somewhat hard   Food Insecurity: No Food Insecurity (8/14/2023)    Hunger Vital Sign     Worried About Running Out of Food in the Last Year: Never true     Ran Out of Food in the Last Year: Never true   Recent Concern: Food Insecurity - Food Insecurity Present (6/22/2023)    Hunger Vital Sign     Worried About Running Out of Food in the Last Year: Sometimes true     Ran Out of Food in the Last Year: Sometimes true   Transportation Needs: No Transportation Needs (8/14/2023)    PRAPARE - Transportation     Lack of Transportation (Medical): No     Lack of Transportation (Non-Medical): No   Recent Concern: Transportation Needs - Unmet Transportation Needs (6/22/2023)    PRAPARE - Transportation     Lack of Transportation (Medical): Yes     Lack of Transportation (Non-Medical): Yes   Physical Activity: Not on file   Stress: Not on file   Social Connections: Not on file   Intimate Partner Violence: Not on file   Housing Stability: Low Risk  (8/14/2023)    Housing Stability Vital Sign     Unable to Pay for Housing in the Last Year: No     Number of Places Lived in the Last Year: 1     Unstable Housing in the Last Year: No     Current Outpatient Medications on File Prior to Visit   Medication Sig    Blood Glucose Monitoring Suppl (OneTouch Verio Flex System) w/Device KIT TEST EVERY MORNING (Patient not taking: Reported on 8/24/2023)    Blood Pressure Monitoring (Adult Blood Pressure Cuff Lg) KIT Use in the morning (Patient not taking: Reported on 4/6/2023)    clindamycin (CLINDAGEL) 1 % gel Apply topically 2 (two) times a day    Multiple Vitamins-Minerals (multivitamin with minerals) tablet Take 1 tablet by mouth daily    omeprazole (PriLOSEC) 20 mg delayed release capsule Take 1 capsule (20 mg total) by mouth daily Do not start before July 25, 2023. (Patient not taking: Reported on 8/24/2023)    OneTouch Delica Lancets 33G MISC Check blood sugars twice daily. Please substitute with appropriate alternative  as covered by patient's insurance. Dx: E11.65 (Patient not taking: Reported on 8/3/2023)    polyethylene glycol (MIRALAX) 17 g packet Take 17 g by mouth daily    simethicone (MYLICON,GAS-X) 180 MG capsule Take 1 capsule (180 mg total) by mouth every 6 (six) hours as needed for flatulence (Patient not taking: Reported on 11/2/2023)     No Known Allergies    There is no immunization history on file for this patient.    Objective     /100 (BP Location: Left arm, Patient Position: Sitting, Cuff Size: Large)   Pulse 88   Temp (!) 97 °F (36.1 °C) (Temporal)   Resp 16   Ht 5' (1.524 m)   Wt 97.1 kg (214 lb)   LMP 01/01/2024 (Exact Date)   SpO2 99%   BMI 41.79 kg/m²     Physical Exam  Vitals reviewed.   Constitutional:       General: She is not in acute distress.     Appearance: She is obese. She is not ill-appearing or diaphoretic.   HENT:      Head: Normocephalic and atraumatic.      Mouth/Throat:      Mouth: Mucous membranes are moist.   Eyes:      General: Lids are normal.      Conjunctiva/sclera: Conjunctivae normal.      Pupils: Pupils are equal, round, and reactive to light.   Cardiovascular:      Rate and Rhythm: Normal rate and regular rhythm.      Pulses: Normal pulses. no weak pulses          Dorsalis pedis pulses are 2+ on the right side and 2+ on the left side.      Heart sounds: Normal heart sounds. No murmur heard.  Pulmonary:      Effort: Pulmonary effort is normal. No tachypnea.      Breath sounds: Normal breath sounds. No decreased breath sounds or wheezing.   Musculoskeletal:      Right lower leg: No edema.      Left lower leg: No edema.      Right foot: Normal range of motion. No deformity.      Left foot: Normal range of motion. No deformity.   Feet:      Right foot:      Protective Sensation: 10 sites tested.  10 sites sensed.      Skin integrity: Skin integrity normal. No ulcer, skin breakdown, erythema, warmth, callus or dry skin.      Toenail Condition: Right toenails are normal.       Left foot:      Protective Sensation: 10 sites tested.  10 sites sensed.      Skin integrity: Skin integrity normal. No ulcer, skin breakdown, erythema, warmth, callus or dry skin.      Toenail Condition: Left toenails are normal.   Skin:     General: Skin is warm and dry.      Capillary Refill: Capillary refill takes less than 2 seconds.   Neurological:      Mental Status: She is alert and oriented to person, place, and time.      Gait: Gait is intact.   Psychiatric:         Attention and Perception: Attention normal.         Mood and Affect: Mood and affect normal.         Speech: Speech normal.         Behavior: Behavior normal.         Thought Content: Thought content normal.     Diabetic Foot Exam    Patient's shoes and socks removed.    Right Foot/Ankle   Right Foot Inspection  Skin Exam: skin normal and skin intact. No dry skin, no warmth, no callus, no erythema, no maceration, no abnormal color, no pre-ulcer, no ulcer and no callus.     Toe Exam: ROM and strength within normal limits.     Sensory   Monofilament testing: intact    Vascular  Capillary refills: < 3 seconds  The right DP pulse is 2+.     Left Foot/Ankle  Left Foot Inspection  Skin Exam: skin normal and skin intact. No dry skin, no warmth, no erythema, no maceration, normal color, no pre-ulcer, no ulcer and no callus.     Toe Exam: ROM and strength within normal limits.     Sensory   Monofilament testing: intact    Vascular  Capillary refills: < 3 seconds  The left DP pulse is 2+.     Assign Risk Category  No deformity present  No loss of protective sensation  No weak pulses  Risk: 0      DEVONTE Reyez

## 2024-01-09 NOTE — ASSESSMENT & PLAN NOTE
"Long, heavy, irregular periods have continued since bariatric surgery, using overnight pads required to change frequently. Reports feeling weak and has a \"heavy\" sensation with urination.  - CBC, iron panel, UA/culture.  - Referred to GYN.   "

## 2024-01-09 NOTE — ASSESSMENT & PLAN NOTE
Body mass index is 41.79 kg/m². The BMI is above normal.   - Nutrition recommendations include encouraging healthy choices of fruits and vegetables, limiting drinks that contain sugar and moderation in carbohydrate intake.   - Exercise recommendations include moderate physical activity 150 minutes/week.   - Continue plan as per Weight Management.

## 2024-01-11 ENCOUNTER — OFFICE VISIT (OUTPATIENT)
Dept: BARIATRICS | Facility: CLINIC | Age: 39
End: 2024-01-11
Payer: MEDICARE

## 2024-01-11 ENCOUNTER — OFFICE VISIT (OUTPATIENT)
Dept: BARIATRICS | Facility: CLINIC | Age: 39
End: 2024-01-11

## 2024-01-11 VITALS
SYSTOLIC BLOOD PRESSURE: 132 MMHG | HEART RATE: 78 BPM | HEIGHT: 60 IN | WEIGHT: 214.5 LBS | BODY MASS INDEX: 42.11 KG/M2 | DIASTOLIC BLOOD PRESSURE: 82 MMHG

## 2024-01-11 DIAGNOSIS — Z48.815 ENCOUNTER FOR SURGICAL AFTERCARE FOLLOWING SURGERY OF DIGESTIVE SYSTEM: Primary | ICD-10-CM

## 2024-01-11 DIAGNOSIS — Z98.84 BARIATRIC SURGERY STATUS: ICD-10-CM

## 2024-01-11 DIAGNOSIS — E66.01 OBESITY, CLASS III, BMI 40-49.9 (MORBID OBESITY) (HCC): Primary | ICD-10-CM

## 2024-01-11 DIAGNOSIS — K91.2 POSTSURGICAL MALABSORPTION: ICD-10-CM

## 2024-01-11 DIAGNOSIS — E66.01 OBESITY, CLASS III, BMI 40-49.9 (MORBID OBESITY) (HCC): ICD-10-CM

## 2024-01-11 DIAGNOSIS — I10 PRIMARY HYPERTENSION: ICD-10-CM

## 2024-01-11 LAB
CREAT UR-MCNC: 448 MG/DL
MICROALBUMIN UR-MCNC: 45.4 MG/L
MICROALBUMIN/CREAT 24H UR: 10 MG/G CREATININE (ref 0–30)

## 2024-01-11 PROCEDURE — RECHECK: Performed by: SOCIAL WORKER

## 2024-01-11 PROCEDURE — 99213 OFFICE O/P EST LOW 20 MIN: CPT | Performed by: PHYSICIAN ASSISTANT

## 2024-01-11 NOTE — PROGRESS NOTES
Leatha Marshall  is a 38 y.o.   female    :  1985    Patient presented for adjustment support after surgery.  Pre-op weight:   269.6    Today's weight:      214.8    Assessment: Feels like she succeeded at holiday by avoiding sweets.     Current Status  Behavior changes in relation to food: Has good portion control, eats on a regular schedule.  Has eliminated sweets and snacks.  Vitamins: has not been consistent. Discussed need.  Is not getting enough water. She is not sipping but gulping.  Does not feel thirsty. Discussed consequences of non-compliance.       Social challenges: has not told everyone about weight loss surgery.  Not confident managing social situations around food.  Emotional Challenges: Still concerned about eating when she is not hungry, fears slipping back into bad eating habits.    Self Concept Changes: Adjustment to changing weight is challenging.    Goals Setting - has some fear in her changing relationship with food and healing from poor body image

## 2024-01-11 NOTE — PATIENT INSTRUCTIONS
Follow-up in 6 months. We kindly ask that your arrive 15 minutes before your scheduled appointment time with your provider to allow our staff to room you, get your vital signs and update your chart.    Call our office if you have any problems with abdominal pain especially associated with fever, chills, nausea, vomiting or any other concerns.    All  Post-bariatric surgery patients should be aware that very small quantities of any alcohol can cause impairment and it is very possible not to feel the effect. The effect can be in the system for several hours.  It is also a stomach irritant.     It is advised to AVOID alcohol, Nonsteroidal antiinflammatory drugs (NSAIDS) and nicotine of all forms . Any of these can cause stomach irritation/pain.    Discussed the effects of alcohol on a bariatric patient and the increased impairment risk.     Keep up the good work!

## 2024-01-11 NOTE — PROGRESS NOTES
Assessment/Plan:     Patient ID: Leatha Marshall is a 38 y.o. female.     Bariatric Surgery Status    -s/p Vertical Sleeve Gastrectomy with Dr. Thai Szymanski on 7/24/2023. Presents to the office today for 3rd postop doing well without complaints  She is off omeprazole without issues   She was restarted on BP medication by her pcp - will continue to monitor     Continued/Maintain healthy weight loss with good nutrition intakes.  Adequate hydration with at least 64oz. fluid intake.  Follow diet as discussed.  Follow vitamin and mineral recommendations as reviewed with you.  Exercise as tolerated.    Colonoscopy referral made: n/a    Follow-up in 6 months. We kindly ask that your arrive 15 minutes before your scheduled appointment time with your provider to allow our staff to room you, get your vital signs and update your chart.    Call our office if you have any problems with abdominal pain especially associated with fever, chills, nausea, vomiting or any other concerns.    All  Post-bariatric surgery patients should be aware that very small quantities of any alcohol can cause impairment and it is very possible not to feel the effect. The effect can be in the system for several hours.  It is also a stomach irritant.     It is advised to AVOID alcohol, Nonsteroidal antiinflammatory drugs (NSAIDS) and nicotine of all forms . Any of these can cause stomach irritation/pain.    Discussed the effects of alcohol on a bariatric patient and the increased impairment risk.     Keep up the good work!     Postsurgical Malabsorption   -At risk for malabsorption of vitamins/minerals secondary to malabsorption and restriction of intake from bariatric surgery  -Currently taking adequate postop bariatric surgery vitamin supplementation  -Last set of bariatric labs completed 10/2023  -Patient received education about the importance of adhering to a lifelong supplementation regimen to avoid vitamin/mineral deficiencies      Diagnoses and all  orders for this visit:    Encounter for surgical aftercare following surgery of digestive system    Bariatric surgery status    Postsurgical malabsorption    Obesity, Class III, BMI 40-49.9 (morbid obesity) (HCC)    Primary hypertension         Subjective:      Patient ID: Leatha Marshall is a 38 y.o. female.    -s/p Vertical Sleeve Gastrectomy with Dr. Thai Szymanski on 7/24/2023. Presents to the office today for 3rd postop doing well without complaintsTolerating diet without issues; denies N/V, dysphagia, reflux    Initial:  Current: 214.5  EWL: (Weight loss is ahead of schedule at this post surgical period.)  Aleks  Current BMI is Body mass index is 41.89 kg/m².    Tolerating a regular diet-yes  Eating at least 60 grams of protein per day-yes  Following 30/60 minute rule with liquids-yes  Drinking at least 64 ounces of fluid per day-working on improving   Sufficient exercise-no  Using NSAIDs regularly-no  Using nicotine-no  Using alcohol-no  Supplements: Multivitamins    EWL is 40%    The following portions of the patient's history were reviewed and updated as appropriate: allergies, current medications, past family history, past medical history, past social history, past surgical history and problem list.    Review of Systems   Constitutional: Negative.    Respiratory: Negative.     Cardiovascular: Negative.    Gastrointestinal: Negative.    Neurological: Negative.    Psychiatric/Behavioral: Negative.           Objective:    /82 (BP Location: Left arm, Patient Position: Sitting, Cuff Size: Standard)   Pulse 78   Ht 5' (1.524 m)   Wt 97.3 kg (214 lb 8 oz)   LMP 01/01/2024 (Exact Date)   BMI 41.89 kg/m²      Physical Exam  Vitals and nursing note reviewed.   Constitutional:       Appearance: Normal appearance. She is obese.   HENT:      Head: Normocephalic and atraumatic.   Eyes:      Extraocular Movements: Extraocular movements intact.      Pupils: Pupils are equal, round, and reactive to light.    Cardiovascular:      Rate and Rhythm: Normal rate.   Pulmonary:      Effort: Pulmonary effort is normal.   Musculoskeletal:         General: Normal range of motion.      Cervical back: Normal range of motion.   Skin:     General: Skin is warm and dry.   Neurological:      General: No focal deficit present.      Mental Status: She is alert and oriented to person, place, and time.   Psychiatric:         Mood and Affect: Mood normal.

## 2024-01-12 ENCOUNTER — TELEPHONE (OUTPATIENT)
Dept: LAB | Facility: HOSPITAL | Age: 39
End: 2024-01-12

## 2024-01-12 DIAGNOSIS — R30.0 DYSURIA: Primary | ICD-10-CM

## 2024-01-12 NOTE — TELEPHONE ENCOUNTER
Venessa Funes  You       PLEASE HAVE CLINICAL STAFF CONTACT PATIENT FOR SAMPLE COLLECTION FOR URINALYSIS , WE DID NOT RECEIVE APPROPIATE SAMPLE       revoPT message sent to pt.

## 2024-01-13 ENCOUNTER — TELEPHONE (OUTPATIENT)
Dept: LAB | Facility: HOSPITAL | Age: 39
End: 2024-01-13

## 2024-01-16 NOTE — PROGRESS NOTES
OB/GYN VISIT  Leatha Marshall  1/23/2024  10:49 AM    Subjective:     Leatha Marshall is a 38 y.o. G0 female who presents for heavy periods since her gastric sleeve in July of 2023. She has lost about 50 pounds since the gastric sleeve. Before the gastric sleeve, she did have regular monthly period that became heavy for about 3 days, then decreased and stopped. She has never been on birth control, and has never become pregnant even when sexually active. Since July, her months have become heavier and longer. They now last about 6-7 days at peak heaviness. While she is on her period, she uses 2 pads when she goes out of the house and changes them every 30 minutes. She does not have clots. Her last period started on 1/1/24 and the peak heaviness slowed down around 1/6-1/7/24. She does not have spotting at all between periods. The patient wonders if this could at all be related to her fibroid uterus visualized on CT that was done in preparation for her gastric sleeve.f    The patient also complains of vaginal discharge that has been going on for some time. It does not have an odor. It is slimy, white, and causes her itching.    The patient does have history of hypertension and migraine with aura, and therefore is not the best candidate for OCPs. Discussed that she could consider starting on hormonal therapy that does not include estrogen, such as Mirena IUD or POPs to improve bleeding. Patient states she is not interested at this time.      Past Medical History:   Diagnosis Date    Diabetes mellitus (HCC)     Hypertension     Obesity     Periodontal disease     Risk factors for obstructive sleep apnea 08/08/2022    Seasonal allergies      Past Surgical History:   Procedure Laterality Date    CATARACT EXTRACTION Left 06/08/2022    CATARACT EXTRACTION Right 11/10/2021    COLONOSCOPY  2013    TN LAPS GSTRC RSTRICTIV PX LONGITUDINAL GASTRECTOMY N/A 7/24/2023    Procedure: GASTRECTOMY SLEEVE W/ ROBOT & INTRAOPERATIVE EGD;   Surgeon: Susan Szymanski MD;  Location: [facility];  Service: Bariatrics       Objective:    Vitals: Blood pressure 140/88, pulse 72, height 5' (1.524 m), weight 99.3 kg (219 lb), last menstrual period 01/07/2024, not currently breastfeeding.Body mass index is 42.77 kg/m².    Physical Exam  GEN: The patient was alert and oriented x3, pleasant well-appearing female in no acute distress.   CV: Regular rate  PULM: nonlabored respirations  MSK: Normal gait  Skin: warm, dry  Neuro: no focal deficits  Psych: normal affect and judgement, cooperative    CTAP 8/14/23: REPRODUCTIVE ORGANS: Enlarged fibroid uterus.        Assessment/Plan:  Leatha Marshall is a 39 y/o G0 with fibroid uterus presenting with menorrhagia since gastric sleeve in July 2023. Discussed with the patient that AUB is likely a physiologic response to rapid weight loss, and that this may settle out over time. However, she may also consider medical treatment with IUD or POPs in consideration of helping with bleeding; the patient is not interested at this time.    Plan:  TVUS to be completed  F/u CBC, TSH  RTC in 1 month to f/u status of bleeding and TVUS result      D/w Dr. Idalia Myers MD  1/23/2024  10:49 AM

## 2024-01-23 ENCOUNTER — APPOINTMENT (OUTPATIENT)
Dept: LAB | Facility: CLINIC | Age: 39
End: 2024-01-23
Payer: MEDICARE

## 2024-01-23 ENCOUNTER — OFFICE VISIT (OUTPATIENT)
Dept: OBGYN CLINIC | Facility: CLINIC | Age: 39
End: 2024-01-23

## 2024-01-23 VITALS
DIASTOLIC BLOOD PRESSURE: 88 MMHG | SYSTOLIC BLOOD PRESSURE: 140 MMHG | HEIGHT: 60 IN | WEIGHT: 219 LBS | BODY MASS INDEX: 43 KG/M2 | HEART RATE: 72 BPM

## 2024-01-23 DIAGNOSIS — Z12.4 CERVICAL CANCER SCREENING: ICD-10-CM

## 2024-01-23 DIAGNOSIS — N92.1 MENORRHAGIA WITH IRREGULAR CYCLE: ICD-10-CM

## 2024-01-23 DIAGNOSIS — I10 PRIMARY HYPERTENSION: ICD-10-CM

## 2024-01-23 DIAGNOSIS — R30.0 DYSURIA: ICD-10-CM

## 2024-01-23 DIAGNOSIS — N89.8 VAGINAL DISCHARGE: Primary | ICD-10-CM

## 2024-01-23 LAB
ALBUMIN SERPL BCP-MCNC: 3.9 G/DL (ref 3.5–5)
ALP SERPL-CCNC: 52 U/L (ref 34–104)
ALT SERPL W P-5'-P-CCNC: 18 U/L (ref 7–52)
ANION GAP SERPL CALCULATED.3IONS-SCNC: 10 MMOL/L
AST SERPL W P-5'-P-CCNC: 22 U/L (ref 13–39)
BACTERIA UR QL AUTO: ABNORMAL /HPF
BASOPHILS # BLD AUTO: 0.02 THOUSANDS/ÂΜL (ref 0–0.1)
BASOPHILS NFR BLD AUTO: 0 % (ref 0–1)
BILIRUB SERPL-MCNC: 0.49 MG/DL (ref 0.2–1)
BILIRUB UR QL STRIP: NEGATIVE
BUN SERPL-MCNC: 9 MG/DL (ref 5–25)
CALCIUM SERPL-MCNC: 9.6 MG/DL (ref 8.4–10.2)
CHLORIDE SERPL-SCNC: 105 MMOL/L (ref 96–108)
CLARITY UR: ABNORMAL
CO2 SERPL-SCNC: 24 MMOL/L (ref 21–32)
COLOR UR: ABNORMAL
CREAT SERPL-MCNC: 1 MG/DL (ref 0.6–1.3)
EOSINOPHIL # BLD AUTO: 0.2 THOUSAND/ÂΜL (ref 0–0.61)
EOSINOPHIL NFR BLD AUTO: 4 % (ref 0–6)
ERYTHROCYTE [DISTWIDTH] IN BLOOD BY AUTOMATED COUNT: 12.9 % (ref 11.6–15.1)
FERRITIN SERPL-MCNC: 18 NG/ML (ref 11–307)
GFR SERPL CREATININE-BSD FRML MDRD: 71 ML/MIN/1.73SQ M
GLUCOSE P FAST SERPL-MCNC: 94 MG/DL (ref 65–99)
GLUCOSE UR STRIP-MCNC: NEGATIVE MG/DL
HCT VFR BLD AUTO: 40.4 % (ref 34.8–46.1)
HGB BLD-MCNC: 13.2 G/DL (ref 11.5–15.4)
HGB UR QL STRIP.AUTO: NEGATIVE
IMM GRANULOCYTES # BLD AUTO: 0.01 THOUSAND/UL (ref 0–0.2)
IMM GRANULOCYTES NFR BLD AUTO: 0 % (ref 0–2)
IRON SATN MFR SERPL: 22 % (ref 15–50)
IRON SERPL-MCNC: 87 UG/DL (ref 50–212)
KETONES UR STRIP-MCNC: NEGATIVE MG/DL
LEUKOCYTE ESTERASE UR QL STRIP: NEGATIVE
LYMPHOCYTES # BLD AUTO: 1.37 THOUSANDS/ÂΜL (ref 0.6–4.47)
LYMPHOCYTES NFR BLD AUTO: 29 % (ref 14–44)
MCH RBC QN AUTO: 30.8 PG (ref 26.8–34.3)
MCHC RBC AUTO-ENTMCNC: 32.7 G/DL (ref 31.4–37.4)
MCV RBC AUTO: 94 FL (ref 82–98)
MONOCYTES # BLD AUTO: 0.35 THOUSAND/ÂΜL (ref 0.17–1.22)
MONOCYTES NFR BLD AUTO: 8 % (ref 4–12)
MUCOUS THREADS UR QL AUTO: ABNORMAL
NEUTROPHILS # BLD AUTO: 2.72 THOUSANDS/ÂΜL (ref 1.85–7.62)
NEUTS SEG NFR BLD AUTO: 59 % (ref 43–75)
NITRITE UR QL STRIP: NEGATIVE
NON-SQ EPI CELLS URNS QL MICRO: ABNORMAL /HPF
NRBC BLD AUTO-RTO: 0 /100 WBCS
PH UR STRIP.AUTO: 5.5 [PH]
PLATELET # BLD AUTO: 242 THOUSANDS/UL (ref 149–390)
PMV BLD AUTO: 12.6 FL (ref 8.9–12.7)
POTASSIUM SERPL-SCNC: 3.7 MMOL/L (ref 3.5–5.3)
PROT SERPL-MCNC: 7 G/DL (ref 6.4–8.4)
PROT UR STRIP-MCNC: ABNORMAL MG/DL
RBC # BLD AUTO: 4.28 MILLION/UL (ref 3.81–5.12)
RBC #/AREA URNS AUTO: ABNORMAL /HPF
SODIUM SERPL-SCNC: 139 MMOL/L (ref 135–147)
SP GR UR STRIP.AUTO: 1.03 (ref 1–1.03)
TIBC SERPL-MCNC: 393 UG/DL (ref 250–450)
UIBC SERPL-MCNC: 306 UG/DL (ref 155–355)
UROBILINOGEN UR STRIP-ACNC: <2 MG/DL
WBC # BLD AUTO: 4.67 THOUSAND/UL (ref 4.31–10.16)
WBC #/AREA URNS AUTO: ABNORMAL /HPF

## 2024-01-23 PROCEDURE — 80053 COMPREHEN METABOLIC PANEL: CPT

## 2024-01-23 PROCEDURE — G0145 SCR C/V CYTO,THINLAYER,RESCR: HCPCS

## 2024-01-23 PROCEDURE — 36415 COLL VENOUS BLD VENIPUNCTURE: CPT

## 2024-01-23 PROCEDURE — 87086 URINE CULTURE/COLONY COUNT: CPT

## 2024-01-23 PROCEDURE — 82728 ASSAY OF FERRITIN: CPT

## 2024-01-23 PROCEDURE — G0476 HPV COMBO ASSAY CA SCREEN: HCPCS

## 2024-01-23 PROCEDURE — 83550 IRON BINDING TEST: CPT

## 2024-01-23 PROCEDURE — 85025 COMPLETE CBC W/AUTO DIFF WBC: CPT

## 2024-01-23 PROCEDURE — 83540 ASSAY OF IRON: CPT

## 2024-01-23 PROCEDURE — 81001 URINALYSIS AUTO W/SCOPE: CPT

## 2024-01-23 PROCEDURE — 99213 OFFICE O/P EST LOW 20 MIN: CPT | Performed by: OBSTETRICS & GYNECOLOGY

## 2024-01-23 RX ORDER — METRONIDAZOLE 500 MG/1
500 TABLET ORAL EVERY 12 HOURS SCHEDULED
Qty: 14 TABLET | Refills: 0 | Status: SHIPPED | OUTPATIENT
Start: 2024-01-23 | End: 2024-01-30

## 2024-01-24 LAB
HPV HR 12 DNA CVX QL NAA+PROBE: NEGATIVE
HPV16 DNA CVX QL NAA+PROBE: NEGATIVE
HPV18 DNA CVX QL NAA+PROBE: NEGATIVE

## 2024-01-25 LAB — BACTERIA UR CULT: NORMAL

## 2024-01-26 ENCOUNTER — HOSPITAL ENCOUNTER (OUTPATIENT)
Dept: ULTRASOUND IMAGING | Facility: HOSPITAL | Age: 39
End: 2024-01-26
Payer: MEDICARE

## 2024-01-26 DIAGNOSIS — N92.1 MENORRHAGIA WITH IRREGULAR CYCLE: ICD-10-CM

## 2024-01-26 PROCEDURE — 76856 US EXAM PELVIC COMPLETE: CPT

## 2024-01-26 PROCEDURE — 76830 TRANSVAGINAL US NON-OB: CPT

## 2024-01-29 LAB
LAB AP GYN PRIMARY INTERPRETATION: NORMAL
Lab: NORMAL

## 2024-02-06 ENCOUNTER — OFFICE VISIT (OUTPATIENT)
Dept: FAMILY MEDICINE CLINIC | Facility: CLINIC | Age: 39
End: 2024-02-06

## 2024-02-06 VITALS
HEART RATE: 88 BPM | RESPIRATION RATE: 16 BRPM | BODY MASS INDEX: 43.19 KG/M2 | OXYGEN SATURATION: 99 % | SYSTOLIC BLOOD PRESSURE: 120 MMHG | HEIGHT: 60 IN | WEIGHT: 220 LBS | TEMPERATURE: 99 F | DIASTOLIC BLOOD PRESSURE: 82 MMHG

## 2024-02-06 DIAGNOSIS — I10 PRIMARY HYPERTENSION: ICD-10-CM

## 2024-02-06 DIAGNOSIS — E66.01 CLASS 3 SEVERE OBESITY DUE TO EXCESS CALORIES WITH SERIOUS COMORBIDITY AND BODY MASS INDEX (BMI) OF 40.0 TO 44.9 IN ADULT (HCC): ICD-10-CM

## 2024-02-06 DIAGNOSIS — Z12.31 ENCOUNTER FOR SCREENING MAMMOGRAM FOR MALIGNANT NEOPLASM OF BREAST: ICD-10-CM

## 2024-02-06 DIAGNOSIS — E11.9 TYPE 2 DIABETES MELLITUS WITHOUT COMPLICATION, WITHOUT LONG-TERM CURRENT USE OF INSULIN (HCC): ICD-10-CM

## 2024-02-06 DIAGNOSIS — Z28.21 IMMUNIZATION DECLINED: ICD-10-CM

## 2024-02-06 DIAGNOSIS — Z00.00 MEDICARE ANNUAL WELLNESS VISIT, SUBSEQUENT: Primary | ICD-10-CM

## 2024-02-06 PROCEDURE — G0439 PPPS, SUBSEQ VISIT: HCPCS

## 2024-02-06 NOTE — PATIENT INSTRUCTIONS
Medicare Preventive Visit Patient Instructions  Thank you for completing your Welcome to Medicare Visit or Medicare Annual Wellness Visit today. Your next wellness visit will be due in one year (2/6/2025).  The screening/preventive services that you may require over the next 5-10 years are detailed below. Some tests may not apply to you based off risk factors and/or age. Screening tests ordered at today's visit but not completed yet may show as past due. Also, please note that scanned in results may not display below.  Preventive Screenings:  Service Recommendations Previous Testing/Comments   Colorectal Cancer Screening  * Colonoscopy    * Fecal Occult Blood Test (FOBT)/Fecal Immunochemical Test (FIT)  * Fecal DNA/Cologuard Test  * Flexible Sigmoidoscopy Age: 45-75 years old   Colonoscopy: every 10 years (may be performed more frequently if at higher risk)  OR  FOBT/FIT: every 1 year  OR  Cologuard: every 3 years  OR  Sigmoidoscopy: every 5 years  Screening may be recommended earlier than age 45 if at higher risk for colorectal cancer. Also, an individualized decision between you and your healthcare provider will decide whether screening between the ages of 76-85 would be appropriate. Colonoscopy: Not on file  FOBT/FIT: Not on file  Cologuard: Not on file  Sigmoidoscopy: Not on file          Breast Cancer Screening Age: 40+ years old  Frequency: every 1-2 years  Not required if history of left and right mastectomy Mammogram: Not on file    Screening Not Indicated   Cervical Cancer Screening Between the ages of 21-29, pap smear recommended once every 3 years.   Between the ages of 30-65, can perform pap smear with HPV co-testing every 5 years.   Recommendations may differ for women with a history of total hysterectomy, cervical cancer, or abnormal pap smears in past. Pap Smear: 01/23/2024    Screening Current   Hepatitis C Screening Once for adults born between 1945 and 1965  More frequently in patients at high risk  for Hepatitis C Hep C Antibody: 08/22/2022    Screening Current   Diabetes Screening 1-2 times per year if you're at risk for diabetes or have pre-diabetes Fasting glucose: 94 mg/dL (1/23/2024)  A1C: 5.7 (1/9/2024)  Screening Not Indicated  History Diabetes   Cholesterol Screening Once every 5 years if you don't have a lipid disorder. May order more often based on risk factors. Lipid panel: 08/14/2023    Screening Current     Other Preventive Screenings Covered by Medicare:  Abdominal Aortic Aneurysm (AAA) Screening: covered once if your at risk. You're considered to be at risk if you have a family history of AAA.  Lung Cancer Screening: covers low dose CT scan once per year if you meet all of the following conditions: (1) Age 55-77; (2) No signs or symptoms of lung cancer; (3) Current smoker or have quit smoking within the last 15 years; (4) You have a tobacco smoking history of at least 20 pack years (packs per day multiplied by number of years you smoked); (5) You get a written order from a healthcare provider.  Glaucoma Screening: covered annually if you're considered high risk: (1) You have diabetes OR (2) Family history of glaucoma OR (3)  aged 50 and older OR (4)  American aged 65 and older  Osteoporosis Screening: covered every 2 years if you meet one of the following conditions: (1) You're estrogen deficient and at risk for osteoporosis based off medical history and other findings; (2) Have a vertebral abnormality; (3) On glucocorticoid therapy for more than 3 months; (4) Have primary hyperparathyroidism; (5) On osteoporosis medications and need to assess response to drug therapy.   Last bone density test (DXA Scan): Not on file.  HIV Screening: covered annually if you're between the age of 15-65. Also covered annually if you are younger than 15 and older than 65 with risk factors for HIV infection. For pregnant patients, it is covered up to 3 times per  pregnancy.    Immunizations:  Immunization Recommendations   Influenza Vaccine Annual influenza vaccination during flu season is recommended for all persons aged >= 6 months who do not have contraindications   Pneumococcal Vaccine   * Pneumococcal conjugate vaccine = PCV13 (Prevnar 13), PCV15 (Vaxneuvance), PCV20 (Prevnar 20)  * Pneumococcal polysaccharide vaccine = PPSV23 (Pneumovax) Adults 19-63 yo with certain risk factors or if 65+ yo  If never received any pneumonia vaccine: recommend Prevnar 20 (PCV20)  Give PCV20 if previously received 1 dose of PCV13 or PPSV23   Hepatitis B Vaccine 3 dose series if at intermediate or high risk (ex: diabetes, end stage renal disease, liver disease)   Respiratory syncytial virus (RSV) Vaccine - COVERED BY MEDICARE PART D  * RSVPreF3 (Arexvy) CDC recommends that adults 60 years of age and older may receive a single dose of RSV vaccine using shared clinical decision-making (SCDM)   Tetanus (Td) Vaccine - COST NOT COVERED BY MEDICARE PART B Following completion of primary series, a booster dose should be given every 10 years to maintain immunity against tetanus. Td may also be given as tetanus wound prophylaxis.   Tdap Vaccine - COST NOT COVERED BY MEDICARE PART B Recommended at least once for all adults. For pregnant patients, recommended with each pregnancy.   Shingles Vaccine (Shingrix) - COST NOT COVERED BY MEDICARE PART B  2 shot series recommended in those 19 years and older who have or will have weakened immune systems or those 50 years and older     Health Maintenance Due:      Topic Date Due   • Cervical Cancer Screening  01/23/2029   • HIV Screening  Completed   • Hepatitis C Screening  Completed     Immunizations Due:      Topic Date Due   • COVID-19 Vaccine (1) Never done   • Pneumococcal Vaccine: Pediatrics (0 to 5 Years) and At-Risk Patients (6 to 64 Years) (1 - PCV) Never done   • Influenza Vaccine (1) Never done     Advance Directives   What are advance  directives?  Advance directives are legal documents that state your wishes and plans for medical care. These plans are made ahead of time in case you lose your ability to make decisions for yourself. Advance directives can apply to any medical decision, such as the treatments you want, and if you want to donate organs.   What are the types of advance directives?  There are many types of advance directives, and each state has rules about how to use them. You may choose a combination of any of the following:  Living will:  This is a written record of the treatment you want. You can also choose which treatments you do not want, which to limit, and which to stop at a certain time. This includes surgery, medicine, IV fluid, and tube feedings.   Durable power of  for healthcare (DPAHC):  This is a written record that states who you want to make healthcare choices for you when you are unable to make them for yourself. This person, called a proxy, is usually a family member or a friend. You may choose more than 1 proxy.  Do not resuscitate (DNR) order:  A DNR order is used in case your heart stops beating or you stop breathing. It is a request not to have certain forms of treatment, such as CPR. A DNR order may be included in other types of advance directives.  Medical directive:  This covers the care that you want if you are in a coma, near death, or unable to make decisions for yourself. You can list the treatments you want for each condition. Treatment may include pain medicine, surgery, blood transfusions, dialysis, IV or tube feedings, and a ventilator (breathing machine).  Values history:  This document has questions about your views, beliefs, and how you feel and think about life. This information can help others choose the care that you would choose.  Why are advance directives important?  An advance directive helps you control your care. Although spoken wishes may be used, it is better to have your wishes  written down. Spoken wishes can be misunderstood, or not followed. Treatments may be given even if you do not want them. An advance directive may make it easier for your family to make difficult choices about your care.   Weight Management   Why it is important to manage your weight:  Being overweight increases your risk of health conditions such as heart disease, high blood pressure, type 2 diabetes, and certain types of cancer. It can also increase your risk for osteoarthritis, sleep apnea, and other respiratory problems. Aim for a slow, steady weight loss. Even a small amount of weight loss can lower your risk of health problems.  How to lose weight safely:  A safe and healthy way to lose weight is to eat fewer calories and get regular exercise. You can lose up about 1 pound a week by decreasing the number of calories you eat by 500 calories each day.   Healthy meal plan for weight management:  A healthy meal plan includes a variety of foods, contains fewer calories, and helps you stay healthy. A healthy meal plan includes the following:  Eat whole-grain foods more often.  A healthy meal plan should contain fiber. Fiber is the part of grains, fruits, and vegetables that is not broken down by your body. Whole-grain foods are healthy and provide extra fiber in your diet. Some examples of whole-grain foods are whole-wheat breads and pastas, oatmeal, brown rice, and bulgur.  Eat a variety of vegetables every day.  Include dark, leafy greens such as spinach, kale, manuel greens, and mustard greens. Eat yellow and orange vegetables such as carrots, sweet potatoes, and winter squash.   Eat a variety of fruits every day.  Choose fresh or canned fruit (canned in its own juice or light syrup) instead of juice. Fruit juice has very little or no fiber.  Eat low-fat dairy foods.  Drink fat-free (skim) milk or 1% milk. Eat fat-free yogurt and low-fat cottage cheese. Try low-fat cheeses such as mozzarella and other reduced-fat  cheeses.  Choose meat and other protein foods that are low in fat.  Choose beans or other legumes such as split peas or lentils. Choose fish, skinless poultry (chicken or turkey), or lean cuts of red meat (beef or pork). Before you cook meat or poultry, cut off any visible fat.   Use less fat and oil.  Try baking foods instead of frying them. Add less fat, such as margarine, sour cream, regular salad dressing and mayonnaise to foods. Eat fewer high-fat foods. Some examples of high-fat foods include french fries, doughnuts, ice cream, and cakes.  Eat fewer sweets.  Limit foods and drinks that are high in sugar. This includes candy, cookies, regular soda, and sweetened drinks.  Exercise:  Exercise at least 30 minutes per day on most days of the week. Some examples of exercise include walking, biking, dancing, and swimming. You can also fit in more physical activity by taking the stairs instead of the elevator or parking farther away from stores. Ask your healthcare provider about the best exercise plan for you.      © Copyright [employer] 2018 Information is for End User's use only and may not be sold, redistributed or otherwise used for commercial purposes. All illustrations and images included in CareNotes® are the copyrighted property of [employer] or [employer]

## 2024-02-06 NOTE — PROGRESS NOTES
Assessment and Plan:     Problem List Items Addressed This Visit       Type 2 diabetes mellitus without complication, without long-term current use of insulin ([facility])       Lab Results   Component Value Date    HGBA1C 5.7 01/09/2024     - Encouraged pt to periodically monitor blood sugar at home.  - Encouraged healthy diet and daily physical activity.   - Pt aware to schedule routine DM eye exam with her current ophthalmologist.         Class 3 severe obesity due to excess calories with serious comorbidity and body mass index (BMI) of 40.0 to 44.9 in adult ([facility])     Pt has regained 6 lbs. Body mass index is 42.97 kg/m². The BMI is above normal.   - Nutrition recommendations include decreasing portion sizes, encouraging healthy choices of fruits and vegetables, limiting drinks that contain sugar and moderation in carbohydrate intake. Follow portion guidance as per Weight Management.   - Strongly encouraged pt to increase physical activity to 150 minutes/week.         Hypertension     BP at goal in office today: 120/82.  - Continue lisinopril 10 mg daily. May be able to trial off medication again in future after additional weight loss.  - Continue low-salt bariatric diet and encouraged daily physical activity.          Immunization declined     Pt declines offered influenza and pneumococcal vaccines at this time despite discussion of risks versus benefits of immunization. Pt believes she received a tetanus booster in 2019 however there is no documentation of this.           Other Visit Diagnoses       Medicare annual wellness visit, subsequent    -  Primary    Encounter for screening mammogram for malignant neoplasm of breast        Relevant Orders    Mammo screening bilateral w 3d & cad          BMI Counseling: Body mass index is 42.97 kg/m². The BMI is above normal. Nutrition recommendations include decreasing portion sizes, encouraging healthy choices of fruits and vegetables, limiting drinks that contain sugar and  moderation in carbohydrate intake. Exercise recommendations include moderate physical activity 150 minutes/week. Rationale for BMI follow-up plan is due to patient being overweight or obese.     Depression Screening and Follow-up Plan: Patient was screened for depression during today's encounter. They screened negative with a PHQ-2 score of 0.    Preventive health issues were discussed with patient, and age appropriate screening tests were ordered as noted in patient's After Visit Summary.  Personalized health advice and appropriate referrals for health education or preventive services given if needed, as noted in patient's After Visit Summary.     History of Present Illness:     Leatha presents to the office for a Medicare Wellness Visit.  Pt disappointed that she has regained some weight. She believes it may be due to eating too much rice, however also has not been physically active due to the cold weather.  Recent pelvic US revealed several uterine fibroids. Pt will follow up with GYN.   Dentist: seen within past year.  Ophthalmologist: needs to schedule.        Patient Care Team:  DEVONTE Reyez as PCP - General (Nurse Practitioner)  Sharron Middleton MD as PCP - PCP-[facility] ([facility])  Sharron Middleton MD as PCP - PCP-[place] ([facility])     Review of Systems:     Review of Systems   Constitutional:  Negative for activity change, appetite change, fatigue, fever and unexpected weight change.   HENT:  Negative for congestion, sore throat and trouble swallowing.    Eyes:  Negative for visual disturbance.   Respiratory:  Negative for cough, shortness of breath and wheezing.    Cardiovascular:  Negative for chest pain, palpitations and leg swelling.   Gastrointestinal:  Negative for abdominal pain, blood in stool, constipation, diarrhea, nausea and vomiting.   Genitourinary:  Positive for menstrual problem. Negative for difficulty urinating and dyspareunia.   Skin:  Negative for rash.    Neurological:  Negative for dizziness, light-headedness and headaches.   Psychiatric/Behavioral:  Negative for dysphoric mood and sleep disturbance. The patient is not nervous/anxious.    All other systems reviewed and are negative.       Problem List:     Patient Active Problem List   Diagnosis    Type 2 diabetes mellitus without complication, without long-term current use of insulin (HCC)    Allergic rhinitis    Gastroesophageal reflux disease    Class 3 severe obesity due to excess calories with serious comorbidity and body mass index (BMI) of 40.0 to 44.9 in adult (HCC)    Hypertension    Vaginal discharge    Abnormal ECG    Periodontitis    Uveitis    MARIA LUISA (obstructive sleep apnea)    Acute pancreatitis    Transaminitis    Acne vulgaris    Menorrhagia with irregular cycle    Immunization declined      Past Medical and Surgical History:     Past Medical History:   Diagnosis Date    Diabetes mellitus (HCC)     Hypertension     Obesity     Periodontal disease     Risk factors for obstructive sleep apnea 08/08/2022    Seasonal allergies      Past Surgical History:   Procedure Laterality Date    CATARACT EXTRACTION Left 06/08/2022    CATARACT EXTRACTION Right 11/10/2021    COLONOSCOPY  2013    VT LAPS GSTRC RSTRICTIV PX LONGITUDINAL GASTRECTOMY N/A 7/24/2023    Procedure: GASTRECTOMY SLEEVE W/ ROBOT & INTRAOPERATIVE EGD;  Surgeon: Susan Szymanski MD;  Location: [facility] OR;  Service: Bariatrics      Family History:     Family History   Problem Relation Age of Onset    Obesity Mother     Sleep apnea Neg Hx       Social History:     Social History     Socioeconomic History    Marital status: Single     Spouse name: None    Number of children: None    Years of education: None    Highest education level: None   Occupational History    None   Tobacco Use    Smoking status: Never     Passive exposure: Never    Smokeless tobacco: Never   Vaping Use    Vaping status: Never Used   Substance and Sexual Activity    Alcohol  use: Never    Drug use: Never    Sexual activity: Not Currently     Partners: Female     Birth control/protection: Abstinence, None   Other Topics Concern    None   Social History Narrative    None     Social Determinants of Health     Financial Resource Strain: Low Risk  (2/6/2024)    Overall Financial Resource Strain (CARDIA)     Difficulty of Paying Living Expenses: Not very hard   Food Insecurity: Food Insecurity Present (2/6/2024)    Hunger Vital Sign     Worried About Running Out of Food in the Last Year: Sometimes true     Ran Out of Food in the Last Year: Sometimes true   Transportation Needs: Unmet Transportation Needs (2/6/2024)    PRAPARE - Transportation     Lack of Transportation (Medical): Yes     Lack of Transportation (Non-Medical): Yes   Physical Activity: Not on file   Stress: Not on file   Social Connections: Not on file   Intimate Partner Violence: Not on file   Housing Stability: Low Risk  (8/14/2023)    Housing Stability Vital Sign     Unable to Pay for Housing in the Last Year: No     Number of Places Lived in the Last Year: 1     Unstable Housing in the Last Year: No      Medications and Allergies:     Current Outpatient Medications   Medication Sig Dispense Refill    Ascorbic Acid (Vitamin C) 500 MG CHEW Chew 500 mg in the morning      lisinopril (ZESTRIL) 10 mg tablet Take 1 tablet (10 mg total) by mouth daily 90 tablet 1    VITAMIN D PO Take by mouth in the morning      Blood Glucose Monitoring Suppl (OneTouch Verio Flex System) w/Device KIT TEST EVERY MORNING (Patient not taking: Reported on 8/24/2023) 1 kit 0    Blood Pressure Monitoring (Adult Blood Pressure Cuff Lg) KIT Use in the morning (Patient not taking: Reported on 4/6/2023) 1 kit 0    clindamycin (CLINDAGEL) 1 % gel Apply topically 2 (two) times a day 60 g 1    Multiple Vitamins-Minerals (multivitamin with minerals) tablet Take 1 tablet by mouth daily      omeprazole (PriLOSEC) 20 mg delayed release capsule Take 1 capsule (20 mg  total) by mouth daily Do not start before July 25, 2023. (Patient not taking: Reported on 8/24/2023) 30 capsule 3    OneTouch Delica Lancets 33G MISC Check blood sugars twice daily. Please substitute with appropriate alternative as covered by patient's insurance. Dx: E11.65 (Patient not taking: Reported on 8/3/2023) 200 each 3    polyethylene glycol (MIRALAX) 17 g packet Take 17 g by mouth daily 30 each 0    simethicone (MYLICON,GAS-X) 180 MG capsule Take 1 capsule (180 mg total) by mouth every 6 (six) hours as needed for flatulence (Patient not taking: Reported on 11/2/2023) 30 capsule 0     No current facility-administered medications for this visit.     No Known Allergies   Immunizations:       There is no immunization history on file for this patient.   Health Maintenance:         Topic Date Due    Cervical Cancer Screening  01/23/2029    HIV Screening  Completed    Hepatitis C Screening  Completed         Topic Date Due    Pneumococcal Vaccine: Pediatrics (0 to 5 Years) and At-Risk Patients (6 to 64 Years) (1 of 2 - PCV) Never done    Influenza Vaccine (1) Never done    COVID-19 Vaccine (1 - 2023-24 season) Never done      Medicare Screening Tests and Risk Assessments:     Leatha is here for her Subsequent Wellness visit.     Health Risk Assessment:   Patient rates overall health as good. Patient feels that their physical health rating is much better. Patient is satisfied with their life. Eyesight was rated as same. Hearing was rated as same. Patient feels that their emotional and mental health rating is much better. Patients states they are never, rarely angry. Patient states they are never, rarely unusually tired/fatigued. Pain experienced in the last 7 days has been none. Patient states that she has experienced no weight loss or gain in last 6 months.     Depression Screening:   PHQ-2 Score: 0      Fall Risk Screening:   In the past year, patient has experienced: no history of falling in past year       Urinary Incontinence Screening:   Patient has not leaked urine accidently in the last six months.     Home Safety:  Patient does not have trouble with stairs inside or outside of their home. Patient has working smoke alarms and has working carbon monoxide detector. Home safety hazards include: none.     Nutrition:   Current diet is Low Saturated Fat and No Added Salt.     Medications:   Patient is currently taking over-the-counter supplements. OTC medications include: vitamin d and c. Patient is able to manage medications.     Activities of Daily Living (ADLs)/Instrumental Activities of Daily Living (IADLs):   Walk and transfer into and out of bed and chair?: Yes  Dress and groom yourself?: Yes    Bathe or shower yourself?: Yes    Feed yourself? Yes  Do your laundry/housekeeping?: Yes  Manage your money, pay your bills and track your expenses?: Yes  Make your own meals?: Yes    Do your own shopping?: Yes    Previous Hospitalizations:   Any hospitalizations or ED visits within the last 12 months?: Yes    How many hospitalizations have you had in the last year?: 1-2    Advance Care Planning:   Living will: Yes    Advanced directive: Yes    Advanced directive counseling given: Yes      Cognitive Screening:   Provider or family/friend/caregiver concerned regarding cognition?: No    PREVENTIVE SCREENINGS      Cardiovascular Screening:    General: Screening Current      Diabetes Screening:     General: Screening Not Indicated and History Diabetes      Colorectal Cancer Screening:     General: Screening Not Indicated      Breast Cancer Screening:     General: Screening Not Indicated      Cervical Cancer Screening:    General: Screening Current      Osteoporosis Screening:    General: Screening Not Indicated      Abdominal Aortic Aneurysm (AAA) Screening:        General: Screening Not Indicated      Lung Cancer Screening:     General: Screening Not Indicated      Hepatitis C Screening:    General: Screening  Current    Screening, Brief Intervention, and Referral to Treatment (SBIRT)    Screening  Typical number of drinks in a day: 0  Typical number of drinks in a week: 0  Interpretation: Low risk drinking behavior.    AUDIT-C Screenin) How often did you have a drink containing alcohol in the past year? never  2) How many drinks did you have on a typical day when you were drinking in the past year? 0  3) How often did you have 6 or more drinks on one occasion in the past year? never    AUDIT-C Score: 0  Interpretation: Score 0-2 (female): Negative screen for alcohol misuse    Single Item Drug Screening:  How often have you used an illegal drug (including marijuana) or a prescription medication for non-medical reasons in the past year? never    Single Item Drug Screen Score: 0  Interpretation: Negative screen for possible drug use disorder    Brief Intervention  Alcohol & drug use screenings were reviewed. No concerns regarding substance use disorder identified.     Other Counseling Topics:   Car/seat belt/driving safety and calcium and vitamin D intake and regular weightbearing exercise.     No results found.     Physical Exam:     /82 (BP Location: Left arm, Patient Position: Sitting, Cuff Size: Large)   Pulse 88   Temp 99 °F (37.2 °C) (Temporal)   Resp 16   Ht 5' (1.524 m)   Wt 99.8 kg (220 lb)   LMP 2024 (Exact Date)   SpO2 99%   BMI 42.97 kg/m²     Physical Exam  Vitals reviewed.   Constitutional:       General: She is not in acute distress.     Appearance: She is morbidly obese. She is not ill-appearing or diaphoretic.   HENT:      Head: Normocephalic and atraumatic.      Right Ear: Tympanic membrane, ear canal and external ear normal.      Left Ear: Tympanic membrane, ear canal and external ear normal.      Nose: Nose normal.      Mouth/Throat:      Mouth: Mucous membranes are moist.      Pharynx: Oropharynx is clear.   Eyes:      General: Lids are normal.      Conjunctiva/sclera:  Conjunctivae normal.      Pupils: Pupils are equal, round, and reactive to light.   Neck:      Thyroid: No thyromegaly or thyroid tenderness.   Cardiovascular:      Rate and Rhythm: Normal rate and regular rhythm.      Pulses: Normal pulses.      Heart sounds: Normal heart sounds. No murmur heard.  Pulmonary:      Effort: Pulmonary effort is normal. No tachypnea.      Breath sounds: Normal breath sounds. No decreased breath sounds, wheezing or rales.   Abdominal:      General: A surgical scar is present. Bowel sounds are normal. There is no distension.      Palpations: Abdomen is soft.      Tenderness: There is no abdominal tenderness. There is no guarding.   Musculoskeletal:      Cervical back: Neck supple.      Right lower leg: No edema.      Left lower leg: No edema.   Lymphadenopathy:      Cervical: No cervical adenopathy.   Skin:     General: Skin is warm and dry.      Capillary Refill: Capillary refill takes less than 2 seconds.   Neurological:      Mental Status: She is alert and oriented to person, place, and time.      Cranial Nerves: No cranial nerve deficit, dysarthria or facial asymmetry.      Motor: Motor function is intact. No weakness.      Gait: Gait is intact.   Psychiatric:         Attention and Perception: Attention normal.         Mood and Affect: Mood and affect normal.         Speech: Speech normal.         Behavior: Behavior normal.         Thought Content: Thought content normal.          DEVONTE Reyez

## 2024-02-07 ENCOUNTER — TELEPHONE (OUTPATIENT)
Dept: FAMILY MEDICINE CLINIC | Facility: CLINIC | Age: 39
End: 2024-02-07

## 2024-02-07 NOTE — TELEPHONE ENCOUNTER
Record request from [employer]  received 2/7/24. Faxed to [facility]. Receipt received.      FORM SCANNED INTO PATIENTS CHART

## 2024-02-08 ENCOUNTER — OFFICE VISIT (OUTPATIENT)
Dept: BARIATRICS | Facility: CLINIC | Age: 39
End: 2024-02-08

## 2024-02-08 VITALS — BODY MASS INDEX: 43.04 KG/M2 | WEIGHT: 220.4 LBS

## 2024-02-08 DIAGNOSIS — E66.01 OBESITY, CLASS III, BMI 40-49.9 (MORBID OBESITY) (HCC): Primary | ICD-10-CM

## 2024-02-08 PROCEDURE — RECHECK: Performed by: SOCIAL WORKER

## 2024-02-08 NOTE — PROGRESS NOTES
Leatha Marshall  is a 38 y.o.   female    :  1985    Patient presented for adjustment support after surgery.  Pre-op weight:   269.4    Today's weight:      220.4    Assessment:  Current Status - gaining weight and was eating rice at every meal.  Had also stopped exercise.  Health is good, diabetes is improved and trending positive.   Vitamins - taking regularly  Alcohol - no alcohol  Eating behaviors - was using food for comfort.  Drinking the water she needs to.  Reviewed guidelines getting her protein first, rather than rice.    Hopes realized so far:   Changes and challenges: aware of stressors: has to move with cousin to [place] in March.  Discussed coping behaviors: walking, prayer, talking to spiritual mother,    Goals Setting - wants a goal weight between 150-160.    Next appointment  for support

## 2024-02-15 PROBLEM — Z28.21 IMMUNIZATION DECLINED: Status: ACTIVE | Noted: 2024-02-15

## 2024-02-15 NOTE — ASSESSMENT & PLAN NOTE
Pt has regained 6 lbs. Body mass index is 42.97 kg/m². The BMI is above normal.   - Nutrition recommendations include decreasing portion sizes, encouraging healthy choices of fruits and vegetables, limiting drinks that contain sugar and moderation in carbohydrate intake. Follow portion guidance as per Weight Management.   - Strongly encouraged pt to increase physical activity to 150 minutes/week.

## 2024-02-15 NOTE — ASSESSMENT & PLAN NOTE
Pt declines offered influenza and pneumococcal vaccines at this time despite discussion of risks versus benefits of immunization. Pt believes she received a tetanus booster in 2019 however there is no documentation of this.

## 2024-02-15 NOTE — ASSESSMENT & PLAN NOTE
Lab Results   Component Value Date    HGBA1C 5.7 01/09/2024     - Encouraged pt to periodically monitor blood sugar at home.  - Encouraged healthy diet and daily physical activity.   - Pt aware to schedule routine DM eye exam with her current ophthalmologist.

## 2024-02-15 NOTE — ASSESSMENT & PLAN NOTE
BP at goal in office today: 120/82.  - Continue lisinopril 10 mg daily. May be able to trial off medication again in future after additional weight loss.  - Continue low-salt bariatric diet and encouraged daily physical activity.

## 2024-02-20 ENCOUNTER — TELEPHONE (OUTPATIENT)
Dept: OBGYN CLINIC | Facility: CLINIC | Age: 39
End: 2024-02-20

## 2024-04-09 ENCOUNTER — TELEPHONE (OUTPATIENT)
Dept: BARIATRICS | Facility: CLINIC | Age: 39
End: 2024-04-09

## 2024-04-09 NOTE — TELEPHONE ENCOUNTER
LVM to let patient know that the appt on 7/11 at [facility] will need to be moved to the [address] location do to the office closing. Same day and time can be kept. Patient just needs to call us and let us know that it is ok. Left office info to elio segal [phone]

## 2024-07-25 ENCOUNTER — RA CDI HCC (OUTPATIENT)
Dept: OTHER | Facility: HOSPITAL | Age: 39
End: 2024-07-25

## 2024-12-06 ENCOUNTER — TELEPHONE (OUTPATIENT)
Dept: BARIATRICS | Facility: CLINIC | Age: 39
End: 2024-12-06

## 2024-12-06 NOTE — TELEPHONE ENCOUNTER
----- Message from Jerri ROLLINS sent at 2024 11:37 AM EST -----  Regardin year f/u appointment  Please contact patient to schedule a 1 year follow up appointment and document the results of the call in EPIC.  Thank You

## 2025-07-11 ENCOUNTER — TELEPHONE (OUTPATIENT)
Dept: FAMILY MEDICINE CLINIC | Facility: CLINIC | Age: 40
End: 2025-07-11

## (undated) DEVICE — VISIGI 3D®  CALIBRATION SYSTEM  SIZE 36FR SLEEVE/STD: Brand: BOEHRINGER® VISIGI 3D™ SLEEVE GASTRECTOMY CALIBRATION SYSTEM, SIZE 36FR

## (undated) DEVICE — STAPLER 60 RELOAD WHITE: Brand: SUREFORM

## (undated) DEVICE — VIOLET BRAIDED (POLYGLACTIN 910), SYNTHETIC ABSORBABLE SUTURE: Brand: COATED VICRYL

## (undated) DEVICE — TUBE SET SMOKE EVAC PNEUMOCLEAR HIGH FLOW

## (undated) DEVICE — TROCAR: Brand: KII FIOS FIRST ENTRY

## (undated) DEVICE — TIP COVER ACCESSORY

## (undated) DEVICE — STAPLER CANNULA SEAL: Brand: ENDOWRIST

## (undated) DEVICE — DRAPE EQUIPMENT RF WAND

## (undated) DEVICE — REDUCER: Brand: ENDOWRIST

## (undated) DEVICE — SUT MONOCRYL 4-0 PS-2 27 IN Y426H

## (undated) DEVICE — ASTOUND STANDARD SURGICAL GOWN, XL: Brand: CONVERTORS

## (undated) DEVICE — COLUMN DRAPE

## (undated) DEVICE — ABSORBABLE WOUND CLOSURE DEVICE: Brand: V-LOC 90

## (undated) DEVICE — VESSEL SEALER EXTEND: Brand: ENDOWRIST

## (undated) DEVICE — DISPOSABLE OR TOWEL: Brand: CARDINAL HEALTH

## (undated) DEVICE — WEBRIL 6 IN UNSTERILE

## (undated) DEVICE — STAPLER 60: Brand: SUREFORM

## (undated) DEVICE — MEGA SUTURECUT ND: Brand: ENDOWRIST

## (undated) DEVICE — BLADELESS OBTURATOR: Brand: WECK VISTA

## (undated) DEVICE — DECANTER: Brand: UNBRANDED

## (undated) DEVICE — PLUMEPEN PRO 10FT

## (undated) DEVICE — SCD SEQUENTIAL COMPRESSION COMFORT SLEEVE MEDIUM KNEE LENGTH: Brand: KENDALL SCD

## (undated) DEVICE — KIT, ROBOTIC BARIATRIC: Brand: CARDINAL HEALTH

## (undated) DEVICE — GLOVE SRG BIOGEL 7.5

## (undated) DEVICE — CADIERE FORCEPS: Brand: ENDOWRIST

## (undated) DEVICE — ARM DRAPE

## (undated) DEVICE — CANNULA SEAL

## (undated) DEVICE — STAPLER 60 RELOAD BLUE: Brand: SUREFORM

## (undated) DEVICE — STRL COTTON TIP APPLCTR 6IN PK: Brand: CARDINAL HEALTH